# Patient Record
Sex: FEMALE | Race: WHITE | Employment: OTHER | URBAN - METROPOLITAN AREA
[De-identification: names, ages, dates, MRNs, and addresses within clinical notes are randomized per-mention and may not be internally consistent; named-entity substitution may affect disease eponyms.]

---

## 2017-01-06 RX ORDER — METFORMIN HYDROCHLORIDE 500 MG/1
TABLET, EXTENDED RELEASE ORAL
Qty: 30 TABLET | Refills: 10 | Status: SHIPPED | OUTPATIENT
Start: 2017-01-06 | End: 2017-09-06 | Stop reason: SDUPTHER

## 2017-02-13 RX ORDER — SIMVASTATIN 20 MG
20 TABLET ORAL NIGHTLY
Qty: 90 TABLET | Refills: 1 | Status: SHIPPED | OUTPATIENT
Start: 2017-02-13 | End: 2017-08-09 | Stop reason: SDUPTHER

## 2017-02-27 DIAGNOSIS — I10 ESSENTIAL HYPERTENSION: ICD-10-CM

## 2017-05-08 ENCOUNTER — HOSPITAL ENCOUNTER (OUTPATIENT)
Dept: MAMMOGRAPHY | Age: 77
Discharge: HOME | End: 2017-05-08
Attending: FAMILY MEDICINE | Admitting: FAMILY MEDICINE

## 2017-05-08 DIAGNOSIS — Z12.31 ENCOUNTER FOR SCREENING MAMMOGRAM FOR BREAST CANCER: ICD-10-CM

## 2017-05-31 DIAGNOSIS — I10 ESSENTIAL HYPERTENSION: ICD-10-CM

## 2017-05-31 RX ORDER — CELECOXIB 200 MG/1
200 CAPSULE ORAL DAILY
Qty: 90 CAPSULE | Refills: 1 | Status: SHIPPED | OUTPATIENT
Start: 2017-05-31 | End: 2018-01-04 | Stop reason: SDUPTHER

## 2017-06-12 ENCOUNTER — HOSPITAL ENCOUNTER (OUTPATIENT)
Dept: PHYSICAL THERAPY | Age: 77
Discharge: HOME | End: 2017-06-30
Admitting: ORTHOPAEDIC SURGERY

## 2017-06-23 ENCOUNTER — TELEPHONE (OUTPATIENT)
Dept: PHYSICAL THERAPY | Age: 77
End: 2017-06-23

## 2017-07-17 RX ORDER — HYDROCHLOROTHIAZIDE 50 MG/1
50 TABLET ORAL DAILY
Qty: 90 TABLET | Refills: 1 | Status: SHIPPED | OUTPATIENT
Start: 2017-07-17 | End: 2018-01-17 | Stop reason: SDUPTHER

## 2017-07-17 RX ORDER — LISINOPRIL 40 MG/1
40 TABLET ORAL DAILY
Qty: 90 TABLET | Refills: 1 | Status: SHIPPED | OUTPATIENT
Start: 2017-07-17 | End: 2018-01-17 | Stop reason: SDUPTHER

## 2017-08-09 DIAGNOSIS — I10 ESSENTIAL HYPERTENSION: Primary | ICD-10-CM

## 2017-08-09 DIAGNOSIS — E78.5 HYPERLIPIDEMIA, UNSPECIFIED HYPERLIPIDEMIA TYPE: ICD-10-CM

## 2017-08-09 DIAGNOSIS — M17.11 PRIMARY OSTEOARTHRITIS OF RIGHT KNEE: ICD-10-CM

## 2017-08-09 DIAGNOSIS — R73.03 PREDIABETES: ICD-10-CM

## 2017-08-09 DIAGNOSIS — Z00.00 WELL ADULT EXAM: ICD-10-CM

## 2017-08-09 RX ORDER — SIMVASTATIN 20 MG
20 TABLET ORAL NIGHTLY
Qty: 90 TABLET | Refills: 0 | Status: SHIPPED | OUTPATIENT
Start: 2017-08-09 | End: 2017-09-06 | Stop reason: SDUPTHER

## 2017-09-01 DIAGNOSIS — R73.03 PREDIABETES: ICD-10-CM

## 2017-09-01 DIAGNOSIS — I10 ESSENTIAL HYPERTENSION: ICD-10-CM

## 2017-09-01 DIAGNOSIS — E78.5 HYPERLIPIDEMIA, UNSPECIFIED HYPERLIPIDEMIA TYPE: ICD-10-CM

## 2017-09-01 DIAGNOSIS — Z00.00 WELL ADULT EXAM: ICD-10-CM

## 2017-09-01 LAB
A/G RATIO: 1.3 (ref 1.1–2.2)
ALBUMIN SERPL-MCNC: 4 G/DL (ref 3.4–5)
ALP BLD-CCNC: 84 U/L (ref 40–129)
ALT SERPL-CCNC: 18 U/L (ref 10–40)
ANION GAP SERPL CALCULATED.3IONS-SCNC: 20 MMOL/L (ref 3–16)
AST SERPL-CCNC: 18 U/L (ref 15–37)
BASOPHILS ABSOLUTE: 0 K/UL (ref 0–0.2)
BASOPHILS RELATIVE PERCENT: 0.3 %
BILIRUB SERPL-MCNC: 0.9 MG/DL (ref 0–1)
BUN BLDV-MCNC: 18 MG/DL (ref 7–20)
CALCIUM SERPL-MCNC: 9.6 MG/DL (ref 8.3–10.6)
CHLORIDE BLD-SCNC: 97 MMOL/L (ref 99–110)
CHOLESTEROL, TOTAL: 210 MG/DL (ref 0–199)
CO2: 26 MMOL/L (ref 21–32)
CREAT SERPL-MCNC: <0.5 MG/DL (ref 0.6–1.2)
EOSINOPHILS ABSOLUTE: 0.1 K/UL (ref 0–0.6)
EOSINOPHILS RELATIVE PERCENT: 1.3 %
GFR AFRICAN AMERICAN: >60
GFR NON-AFRICAN AMERICAN: >60
GLOBULIN: 3.1 G/DL
GLUCOSE BLD-MCNC: 120 MG/DL (ref 70–99)
HCT VFR BLD CALC: 45.6 % (ref 36–48)
HDLC SERPL-MCNC: 65 MG/DL (ref 40–60)
HEMOGLOBIN: 15.3 G/DL (ref 12–16)
LDL CHOLESTEROL CALCULATED: 99 MG/DL
LYMPHOCYTES ABSOLUTE: 3.3 K/UL (ref 1–5.1)
LYMPHOCYTES RELATIVE PERCENT: 34.5 %
MCH RBC QN AUTO: 31.7 PG (ref 26–34)
MCHC RBC AUTO-ENTMCNC: 33.5 G/DL (ref 31–36)
MCV RBC AUTO: 94.5 FL (ref 80–100)
MONOCYTES ABSOLUTE: 0.7 K/UL (ref 0–1.3)
MONOCYTES RELATIVE PERCENT: 7.5 %
NEUTROPHILS ABSOLUTE: 5.3 K/UL (ref 1.7–7.7)
NEUTROPHILS RELATIVE PERCENT: 56.4 %
PDW BLD-RTO: 14.1 % (ref 12.4–15.4)
PLATELET # BLD: 176 K/UL (ref 135–450)
PMV BLD AUTO: 10.1 FL (ref 5–10.5)
POTASSIUM SERPL-SCNC: 3.2 MMOL/L (ref 3.5–5.1)
RBC # BLD: 4.83 M/UL (ref 4–5.2)
SODIUM BLD-SCNC: 143 MMOL/L (ref 136–145)
TOTAL PROTEIN: 7.1 G/DL (ref 6.4–8.2)
TRIGL SERPL-MCNC: 232 MG/DL (ref 0–150)
TSH REFLEX: 2.74 UIU/ML (ref 0.27–4.2)
VLDLC SERPL CALC-MCNC: 46 MG/DL
WBC # BLD: 9.5 K/UL (ref 4–11)

## 2017-09-02 LAB
ESTIMATED AVERAGE GLUCOSE: 119.8 MG/DL
HBA1C MFR BLD: 5.8 %

## 2017-09-02 RX ORDER — POTASSIUM CHLORIDE 20 MEQ/1
20 TABLET, EXTENDED RELEASE ORAL DAILY
Qty: 60 TABLET | Refills: 1 | Status: SHIPPED | OUTPATIENT
Start: 2017-09-02 | End: 2018-01-04 | Stop reason: SDUPTHER

## 2017-09-06 ENCOUNTER — OFFICE VISIT (OUTPATIENT)
Dept: FAMILY MEDICINE CLINIC | Age: 77
End: 2017-09-06

## 2017-09-06 VITALS
BODY MASS INDEX: 47.99 KG/M2 | OXYGEN SATURATION: 95 % | WEIGHT: 254 LBS | SYSTOLIC BLOOD PRESSURE: 138 MMHG | HEART RATE: 78 BPM | DIASTOLIC BLOOD PRESSURE: 78 MMHG

## 2017-09-06 DIAGNOSIS — I10 ESSENTIAL HYPERTENSION: ICD-10-CM

## 2017-09-06 DIAGNOSIS — E87.6 HYPOKALEMIA: ICD-10-CM

## 2017-09-06 DIAGNOSIS — Z00.00 ENCOUNTER FOR MEDICARE ANNUAL WELLNESS EXAM: Primary | ICD-10-CM

## 2017-09-06 DIAGNOSIS — R10.32 LLQ ABDOMINAL PAIN: ICD-10-CM

## 2017-09-06 DIAGNOSIS — E66.01 MORBID OBESITY WITH BMI OF 40.0-44.9, ADULT (HCC): ICD-10-CM

## 2017-09-06 DIAGNOSIS — Z71.2 ENCOUNTER TO DISCUSS TEST RESULTS: ICD-10-CM

## 2017-09-06 DIAGNOSIS — Z23 FLU VACCINE NEED: ICD-10-CM

## 2017-09-06 DIAGNOSIS — E78.5 HYPERLIPIDEMIA, UNSPECIFIED HYPERLIPIDEMIA TYPE: ICD-10-CM

## 2017-09-06 DIAGNOSIS — R73.03 PREDIABETES: ICD-10-CM

## 2017-09-06 LAB
BACTERIA URINE, POC: ABNORMAL
BILIRUBIN URINE: 0 MG/DL
BLOOD, URINE: NEGATIVE
CASTS URINE, POC: ABNORMAL
CLARITY: ABNORMAL
COLOR: YELLOW
CRYSTALS URINE, POC: ABNORMAL
EPI CELLS URINE, POC: ABNORMAL
GLUCOSE URINE: NEGATIVE
KETONES, URINE: NEGATIVE
LEUKOCYTE EST, POC: ABNORMAL
NITRITE, URINE: NEGATIVE
PH UA: 6 (ref 4.5–8)
PROTEIN UA: POSITIVE
RBC URINE, POC: ABNORMAL
SPECIFIC GRAVITY UA: 1.01 (ref 1–1.03)
UROBILINOGEN, URINE: NORMAL
WBC URINE, POC: ABNORMAL
YEAST URINE, POC: ABNORMAL

## 2017-09-06 PROCEDURE — G0008 ADMIN INFLUENZA VIRUS VAC: HCPCS | Performed by: FAMILY MEDICINE

## 2017-09-06 PROCEDURE — 81000 URINALYSIS NONAUTO W/SCOPE: CPT | Performed by: FAMILY MEDICINE

## 2017-09-06 PROCEDURE — G0439 PPPS, SUBSEQ VISIT: HCPCS | Performed by: FAMILY MEDICINE

## 2017-09-06 PROCEDURE — 90662 IIV NO PRSV INCREASED AG IM: CPT | Performed by: FAMILY MEDICINE

## 2017-09-06 RX ORDER — ICOSAPENT ETHYL 1000 MG/1
2 CAPSULE ORAL DAILY
Qty: 60 CAPSULE | Refills: 3 | Status: SHIPPED | OUTPATIENT
Start: 2017-09-06 | End: 2017-09-28 | Stop reason: SDUPTHER

## 2017-09-06 RX ORDER — METFORMIN HYDROCHLORIDE 500 MG/1
500 TABLET, EXTENDED RELEASE ORAL
Qty: 90 TABLET | Refills: 3 | Status: SHIPPED | OUTPATIENT
Start: 2017-09-06 | End: 2018-09-22 | Stop reason: SDUPTHER

## 2017-09-06 RX ORDER — SIMVASTATIN 20 MG
20 TABLET ORAL NIGHTLY
Qty: 90 TABLET | Refills: 3 | Status: SHIPPED | OUTPATIENT
Start: 2017-09-06 | End: 2018-09-22 | Stop reason: SDUPTHER

## 2017-09-06 ASSESSMENT — LIFESTYLE VARIABLES
HOW OFTEN DURING THE LAST YEAR HAVE YOU HAD A FEELING OF GUILT OR REMORSE AFTER DRINKING: 0
HAS A RELATIVE, FRIEND, DOCTOR, OR ANOTHER HEALTH PROFESSIONAL EXPRESSED CONCERN ABOUT YOUR DRINKING OR SUGGESTED YOU CUT DOWN: 0
HOW OFTEN DURING THE LAST YEAR HAVE YOU NEEDED AN ALCOHOLIC DRINK FIRST THING IN THE MORNING TO GET YOURSELF GOING AFTER A NIGHT OF HEAVY DRINKING: 0
HOW OFTEN DO YOU HAVE SIX OR MORE DRINKS ON ONE OCCASION: 0
HOW OFTEN DURING THE LAST YEAR HAVE YOU FAILED TO DO WHAT WAS NORMALLY EXPECTED FROM YOU BECAUSE OF DRINKING: 0
AUDIT-C TOTAL SCORE: 1
HOW OFTEN DURING THE LAST YEAR HAVE YOU FOUND THAT YOU WERE NOT ABLE TO STOP DRINKING ONCE YOU HAD STARTED: 0
HOW OFTEN DO YOU HAVE A DRINK CONTAINING ALCOHOL: 1
HOW OFTEN DURING THE LAST YEAR HAVE YOU BEEN UNABLE TO REMEMBER WHAT HAPPENED THE NIGHT BEFORE BECAUSE YOU HAD BEEN DRINKING: 0
HOW MANY STANDARD DRINKS CONTAINING ALCOHOL DO YOU HAVE ON A TYPICAL DAY: 0
HAVE YOU OR SOMEONE ELSE BEEN INJURED AS A RESULT OF YOUR DRINKING: 0
AUDIT TOTAL SCORE: 1

## 2017-09-06 ASSESSMENT — PATIENT HEALTH QUESTIONNAIRE - PHQ9: SUM OF ALL RESPONSES TO PHQ QUESTIONS 1-9: 0

## 2017-09-06 ASSESSMENT — ANXIETY QUESTIONNAIRES: GAD7 TOTAL SCORE: 1

## 2017-09-07 ENCOUNTER — NURSE ONLY (OUTPATIENT)
Dept: FAMILY MEDICINE CLINIC | Age: 77
End: 2017-09-07

## 2017-09-07 DIAGNOSIS — E78.5 HYPERLIPIDEMIA, UNSPECIFIED HYPERLIPIDEMIA TYPE: ICD-10-CM

## 2017-09-07 DIAGNOSIS — Z12.11 SCREEN FOR COLON CANCER: Primary | ICD-10-CM

## 2017-09-08 LAB
CONTROL: NORMAL
HEMOCCULT STL QL: NEGATIVE
ORGANISM: ABNORMAL
ORGANISM: ABNORMAL
URINE CULTURE, ROUTINE: ABNORMAL
URINE CULTURE, ROUTINE: ABNORMAL

## 2017-09-08 PROCEDURE — 82274 ASSAY TEST FOR BLOOD FECAL: CPT | Performed by: FAMILY MEDICINE

## 2017-09-08 RX ORDER — CIPROFLOXACIN 250 MG/1
250 TABLET, FILM COATED ORAL 2 TIMES DAILY
Qty: 14 TABLET | Refills: 0 | Status: SHIPPED | OUTPATIENT
Start: 2017-09-08 | End: 2017-09-15

## 2017-09-28 DIAGNOSIS — E78.5 HYPERLIPIDEMIA, UNSPECIFIED HYPERLIPIDEMIA TYPE: ICD-10-CM

## 2017-09-28 RX ORDER — ICOSAPENT ETHYL 1000 MG/1
2 CAPSULE ORAL DAILY
Qty: 60 CAPSULE | Refills: 5 | Status: SHIPPED | OUTPATIENT
Start: 2017-09-28 | End: 2018-04-11 | Stop reason: ALTCHOICE

## 2017-12-26 DIAGNOSIS — I10 ESSENTIAL HYPERTENSION: ICD-10-CM

## 2017-12-26 NOTE — TELEPHONE ENCOUNTER
Last Ov: 09/06/2017, medicare annual wellness exam  Last Refill: 05/31/2017, #180,1    Lab Results   Component Value Date     09/01/2017    K 3.2 (L) 09/01/2017    CL 97 (L) 09/01/2017    CO2 26 09/01/2017    BUN 18 09/01/2017    CREATININE <0.5 (L) 09/01/2017    GLUCOSE 120 (H) 09/01/2017    CALCIUM 9.6 09/01/2017    PROT 7.1 09/01/2017    LABALBU 4.0 09/01/2017    BILITOT 0.9 09/01/2017    ALKPHOS 84 09/01/2017    AST 18 09/01/2017    ALT 18 09/01/2017    LABGLOM >60 09/01/2017    GFRAA >60 09/01/2017    AGRATIO 1.3 09/01/2017    GLOB 3.1 09/01/2017

## 2018-01-04 DIAGNOSIS — I10 ESSENTIAL HYPERTENSION: ICD-10-CM

## 2018-01-04 RX ORDER — CELECOXIB 200 MG/1
200 CAPSULE ORAL DAILY
Qty: 90 CAPSULE | Refills: 3 | Status: SHIPPED | OUTPATIENT
Start: 2018-01-04 | End: 2018-12-22 | Stop reason: SDUPTHER

## 2018-01-04 RX ORDER — POTASSIUM CHLORIDE 20 MEQ/1
20 TABLET, EXTENDED RELEASE ORAL DAILY
Qty: 90 TABLET | Refills: 3 | Status: SHIPPED | OUTPATIENT
Start: 2018-01-04 | End: 2018-12-22 | Stop reason: SDUPTHER

## 2018-01-04 NOTE — TELEPHONE ENCOUNTER
Last OV 9/6/17    Lab Results   Component Value Date     09/01/2017    K 3.2 (L) 09/01/2017    CL 97 (L) 09/01/2017    CO2 26 09/01/2017    BUN 18 09/01/2017    CREATININE <0.5 (L) 09/01/2017    GLUCOSE 120 (H) 09/01/2017    CALCIUM 9.6 09/01/2017    PROT 7.1 09/01/2017    LABALBU 4.0 09/01/2017    BILITOT 0.9 09/01/2017    ALKPHOS 84 09/01/2017    AST 18 09/01/2017    ALT 18 09/01/2017    LABGLOM >60 09/01/2017    GFRAA >60 09/01/2017    AGRATIO 1.3 09/01/2017    GLOB 3.1 09/01/2017

## 2018-04-11 ENCOUNTER — OFFICE VISIT (OUTPATIENT)
Dept: ORTHOPEDIC SURGERY | Age: 78
End: 2018-04-11

## 2018-04-11 VITALS
BODY MASS INDEX: 50.89 KG/M2 | SYSTOLIC BLOOD PRESSURE: 136 MMHG | HEIGHT: 60 IN | DIASTOLIC BLOOD PRESSURE: 78 MMHG | HEART RATE: 91 BPM | WEIGHT: 259.2 LBS

## 2018-04-11 DIAGNOSIS — M17.12 PRIMARY OSTEOARTHRITIS OF LEFT KNEE: ICD-10-CM

## 2018-04-11 DIAGNOSIS — M17.11 PRIMARY OSTEOARTHRITIS OF RIGHT KNEE: ICD-10-CM

## 2018-04-11 DIAGNOSIS — M25.561 CHRONIC PAIN OF BOTH KNEES: Primary | ICD-10-CM

## 2018-04-11 DIAGNOSIS — G89.29 CHRONIC PAIN OF BOTH KNEES: Primary | ICD-10-CM

## 2018-04-11 DIAGNOSIS — M25.562 CHRONIC PAIN OF BOTH KNEES: Primary | ICD-10-CM

## 2018-04-11 PROCEDURE — APPNB15 APP NON BILLABLE TIME 0-15 MINS: Performed by: PHYSICIAN ASSISTANT

## 2018-04-11 PROCEDURE — 20610 DRAIN/INJ JOINT/BURSA W/O US: CPT | Performed by: ORTHOPAEDIC SURGERY

## 2018-04-11 PROCEDURE — 99213 OFFICE O/P EST LOW 20 MIN: CPT | Performed by: ORTHOPAEDIC SURGERY

## 2018-04-11 RX ORDER — BETAMETHASONE SODIUM PHOSPHATE AND BETAMETHASONE ACETATE 3; 3 MG/ML; MG/ML
12 INJECTION, SUSPENSION INTRA-ARTICULAR; INTRALESIONAL; INTRAMUSCULAR; SOFT TISSUE ONCE
Status: COMPLETED | OUTPATIENT
Start: 2018-04-11 | End: 2018-04-11

## 2018-04-11 RX ADMIN — BETAMETHASONE SODIUM PHOSPHATE AND BETAMETHASONE ACETATE 12 MG: 3; 3 INJECTION, SUSPENSION INTRA-ARTICULAR; INTRALESIONAL; INTRAMUSCULAR; SOFT TISSUE at 14:14

## 2018-04-20 DIAGNOSIS — E87.6 HYPOKALEMIA: ICD-10-CM

## 2018-04-20 DIAGNOSIS — E78.5 HYPERLIPIDEMIA, UNSPECIFIED HYPERLIPIDEMIA TYPE: ICD-10-CM

## 2018-04-21 LAB
ALBUMIN SERPL-MCNC: 4.5 G/DL (ref 3.4–5)
ALP BLD-CCNC: 95 U/L (ref 40–129)
ALT SERPL-CCNC: 27 U/L (ref 10–40)
ANION GAP SERPL CALCULATED.3IONS-SCNC: 19 MMOL/L (ref 3–16)
AST SERPL-CCNC: 19 U/L (ref 15–37)
BILIRUB SERPL-MCNC: 1 MG/DL (ref 0–1)
BILIRUBIN DIRECT: <0.2 MG/DL (ref 0–0.3)
BILIRUBIN, INDIRECT: NORMAL MG/DL (ref 0–1)
BUN BLDV-MCNC: 20 MG/DL (ref 7–20)
CALCIUM SERPL-MCNC: 9.8 MG/DL (ref 8.3–10.6)
CHLORIDE BLD-SCNC: 95 MMOL/L (ref 99–110)
CO2: 29 MMOL/L (ref 21–32)
CREAT SERPL-MCNC: <0.5 MG/DL (ref 0.6–1.2)
GFR AFRICAN AMERICAN: >60
GFR NON-AFRICAN AMERICAN: >60
GLUCOSE BLD-MCNC: 95 MG/DL (ref 70–99)
POTASSIUM SERPL-SCNC: 4.1 MMOL/L (ref 3.5–5.1)
SODIUM BLD-SCNC: 143 MMOL/L (ref 136–145)
TOTAL PROTEIN: 7.4 G/DL (ref 6.4–8.2)

## 2018-05-09 ENCOUNTER — OFFICE VISIT (OUTPATIENT)
Dept: FAMILY MEDICINE CLINIC | Age: 78
End: 2018-05-09

## 2018-05-09 VITALS
BODY MASS INDEX: 47.98 KG/M2 | DIASTOLIC BLOOD PRESSURE: 74 MMHG | WEIGHT: 244.4 LBS | OXYGEN SATURATION: 96 % | HEIGHT: 60 IN | SYSTOLIC BLOOD PRESSURE: 130 MMHG | HEART RATE: 79 BPM

## 2018-05-09 DIAGNOSIS — R73.03 PREDIABETES: ICD-10-CM

## 2018-05-09 DIAGNOSIS — E78.49 OTHER HYPERLIPIDEMIA: ICD-10-CM

## 2018-05-09 DIAGNOSIS — I10 ESSENTIAL HYPERTENSION: Primary | ICD-10-CM

## 2018-05-09 DIAGNOSIS — E66.01 MORBID OBESITY WITH BMI OF 45.0-49.9, ADULT (HCC): ICD-10-CM

## 2018-05-09 LAB
CHOLESTEROL, TOTAL: 174 MG/DL (ref 0–199)
HDLC SERPL-MCNC: 57 MG/DL (ref 40–60)
LDL CHOLESTEROL CALCULATED: 70 MG/DL
TRIGL SERPL-MCNC: 233 MG/DL (ref 0–150)
VLDLC SERPL CALC-MCNC: 47 MG/DL

## 2018-05-09 PROCEDURE — 99214 OFFICE O/P EST MOD 30 MIN: CPT | Performed by: FAMILY MEDICINE

## 2018-05-09 RX ORDER — CHLORTHALIDONE 25 MG/1
25 TABLET ORAL DAILY
Qty: 90 TABLET | Refills: 3 | Status: SHIPPED | OUTPATIENT
Start: 2018-05-09 | End: 2019-03-22 | Stop reason: SDUPTHER

## 2018-05-10 LAB
ESTIMATED AVERAGE GLUCOSE: 122.6 MG/DL
HBA1C MFR BLD: 5.9 %

## 2018-07-02 ENCOUNTER — OFFICE VISIT (OUTPATIENT)
Dept: ORTHOPEDIC SURGERY | Age: 78
End: 2018-07-02

## 2018-07-02 VITALS
WEIGHT: 244 LBS | BODY MASS INDEX: 47.91 KG/M2 | HEIGHT: 60 IN | HEART RATE: 70 BPM | DIASTOLIC BLOOD PRESSURE: 86 MMHG | SYSTOLIC BLOOD PRESSURE: 136 MMHG

## 2018-07-02 DIAGNOSIS — M17.12 PRIMARY OSTEOARTHRITIS OF LEFT KNEE: ICD-10-CM

## 2018-07-02 DIAGNOSIS — M17.11 PRIMARY OSTEOARTHRITIS OF RIGHT KNEE: Primary | ICD-10-CM

## 2018-07-02 PROCEDURE — 20610 DRAIN/INJ JOINT/BURSA W/O US: CPT | Performed by: PHYSICIAN ASSISTANT

## 2018-07-02 PROCEDURE — 99213 OFFICE O/P EST LOW 20 MIN: CPT | Performed by: PHYSICIAN ASSISTANT

## 2018-07-02 RX ORDER — BETAMETHASONE SODIUM PHOSPHATE AND BETAMETHASONE ACETATE 3; 3 MG/ML; MG/ML
12 INJECTION, SUSPENSION INTRA-ARTICULAR; INTRALESIONAL; INTRAMUSCULAR; SOFT TISSUE ONCE
Status: COMPLETED | OUTPATIENT
Start: 2018-07-02 | End: 2018-07-02

## 2018-07-02 RX ADMIN — BETAMETHASONE SODIUM PHOSPHATE AND BETAMETHASONE ACETATE 12 MG: 3; 3 INJECTION, SUSPENSION INTRA-ARTICULAR; INTRALESIONAL; INTRAMUSCULAR; SOFT TISSUE at 14:57

## 2018-07-02 RX ADMIN — BETAMETHASONE SODIUM PHOSPHATE AND BETAMETHASONE ACETATE 12 MG: 3; 3 INJECTION, SUSPENSION INTRA-ARTICULAR; INTRALESIONAL; INTRAMUSCULAR; SOFT TISSUE at 14:54

## 2018-07-02 NOTE — PROGRESS NOTES
The correct patient, procedure, site and side were identified. Both knees were prepped with Betadine and 2 mL of betamethasone (12mg) mixed with 5 mL 1% lidocaine plain (50mg) were instilled with careful aspiration and injection under aseptic technique. The skin was then cleaned again with alcohol and a sterile adhesive dressing was applied. She tolerated this well and was instructed regarding post-injection care of icing and decreased activity as necessary. Impression:   Diagnosis Orders   1. Primary osteoarthritis of right knee  92605 - [place] DRAIN/INJECT LARGE JOINT/BURSA   2. Primary osteoarthritis of left knee  12214 - [place] DRAIN/INJECT LARGE JOINT/BURSA       Treatment Plan:    Patient presented today requesting repeat bilateral knee cortisone injections. She receives these about every 3 months. Her last ones worked very well. She is not yet ready to discuss total knee arthroplasty. Cortisone injections completed as recorded above in the procedure note. Patient will follow-up as needed. Penny Salgado was informed of the results of any imaging. We discussed treatment options and a time was given to answer questions. A plan was proposed and Penny Salgado understand and accepts this course of care. Electronically signed by Argenis Davidson PA-C on 9/3/1542  Board Certified [facility]    Please note that portions of this note were completed with a voice recognition program.  Efforts were made to edit the dictations but occasionally words are mis-transcribed.

## 2018-09-22 DIAGNOSIS — I10 ESSENTIAL HYPERTENSION: ICD-10-CM

## 2018-09-22 DIAGNOSIS — R73.03 PREDIABETES: ICD-10-CM

## 2018-09-22 DIAGNOSIS — E78.5 HYPERLIPIDEMIA, UNSPECIFIED HYPERLIPIDEMIA TYPE: ICD-10-CM

## 2018-09-25 ENCOUNTER — OFFICE VISIT (OUTPATIENT)
Dept: ORTHOPEDIC SURGERY | Age: 78
End: 2018-09-25
Payer: MEDICARE

## 2018-09-25 VITALS
DIASTOLIC BLOOD PRESSURE: 79 MMHG | HEIGHT: 60 IN | HEART RATE: 89 BPM | WEIGHT: 244 LBS | BODY MASS INDEX: 47.91 KG/M2 | SYSTOLIC BLOOD PRESSURE: 139 MMHG

## 2018-09-25 DIAGNOSIS — M17.11 PRIMARY OSTEOARTHRITIS OF RIGHT KNEE: Primary | ICD-10-CM

## 2018-09-25 DIAGNOSIS — M17.12 PRIMARY OSTEOARTHRITIS OF LEFT KNEE: ICD-10-CM

## 2018-09-25 PROCEDURE — 20610 DRAIN/INJ JOINT/BURSA W/O US: CPT | Performed by: PHYSICIAN ASSISTANT

## 2018-09-25 PROCEDURE — 99213 OFFICE O/P EST LOW 20 MIN: CPT | Performed by: PHYSICIAN ASSISTANT

## 2018-09-25 RX ORDER — LISINOPRIL 40 MG/1
TABLET ORAL
Qty: 90 TABLET | Refills: 1 | Status: SHIPPED | OUTPATIENT
Start: 2018-09-25 | End: 2019-03-22 | Stop reason: SDUPTHER

## 2018-09-25 RX ORDER — METFORMIN HYDROCHLORIDE 500 MG/1
TABLET, EXTENDED RELEASE ORAL
Qty: 90 TABLET | Refills: 1 | Status: SHIPPED | OUTPATIENT
Start: 2018-09-25 | End: 2019-03-22 | Stop reason: SDUPTHER

## 2018-09-25 RX ORDER — BETAMETHASONE SODIUM PHOSPHATE AND BETAMETHASONE ACETATE 3; 3 MG/ML; MG/ML
12 INJECTION, SUSPENSION INTRA-ARTICULAR; INTRALESIONAL; INTRAMUSCULAR; SOFT TISSUE ONCE
Status: COMPLETED | OUTPATIENT
Start: 2018-09-25 | End: 2018-09-25

## 2018-09-25 RX ORDER — SIMVASTATIN 20 MG
TABLET ORAL
Qty: 90 TABLET | Refills: 1 | Status: SHIPPED | OUTPATIENT
Start: 2018-09-25 | End: 2019-03-22 | Stop reason: SDUPTHER

## 2018-09-25 RX ADMIN — BETAMETHASONE SODIUM PHOSPHATE AND BETAMETHASONE ACETATE 12 MG: 3; 3 INJECTION, SUSPENSION INTRA-ARTICULAR; INTRALESIONAL; INTRAMUSCULAR; SOFT TISSUE at 14:35

## 2018-09-25 RX ADMIN — BETAMETHASONE SODIUM PHOSPHATE AND BETAMETHASONE ACETATE 12 MG: 3; 3 INJECTION, SUSPENSION INTRA-ARTICULAR; INTRALESIONAL; INTRAMUSCULAR; SOFT TISSUE at 14:36

## 2018-09-25 NOTE — TELEPHONE ENCOUNTER
Last OV 05/09/18  Last RF  Lisinopril: 01/17/18 #90, 2 refills  Metformin: 09/06/17 #90, 3 refills  Metoprolol: 12/26/17 #180, 2 refills  Simvastatin: 006/17 #90, 3 refills  Lab Results   Component Value Date    CHOL 174 05/09/2018    CHOL 210 (H) 09/01/2017    CHOL 196 05/25/2016     Lab Results   Component Value Date    TRIG 233 (H) 05/09/2018    TRIG 232 (H) 09/01/2017    TRIG 193 (H) 05/25/2016     Lab Results   Component Value Date    HDL 57 05/09/2018    HDL 65 (H) 09/01/2017    HDL 67 (H) 05/25/2016     Lab Results   Component Value Date    LDLCALC 70 05/09/2018    LDLCALC 99 09/01/2017    [address] 90 05/25/2016     Lab Results   Component Value Date    LABVLDL 47 05/09/2018    LABVLDL 46 09/01/2017    LABVLDL 39 05/25/2016     No results found for: [facility]  Lab Results   Component Value Date    LABA1C 5.9 05/09/2018     Lab Results   Component Value Date    .6 05/09/2018

## 2018-09-25 NOTE — PROGRESS NOTES
Patient Name: Dara Soliz Dr Record Number: [id number]  [place]: 1940  Date of Encounter: 9/25/2018     Chief Complaint   Patient presents with    Knee Pain     follow up after bilateral knee injections       History of Present Illness:   Ms. Jasen Urbina is here in follow up regarding her chronic bilateral knee pain. Patient was a long-time patient of Dr. Gavin Vasquez. She has known arthritis of her knees bilaterally. She receives cortisone injections every 3 months. She is here today for repeat cortisone injections. She denies any recent injury to her knees bilaterally. She is not yet ready to talk about knee arthroplasty. The patient's past medical history, medications, allergies, family history, social history, and review of systems have been reviewed, and dated and are recorded in the chart under the 'MEDIA\" tab. Physical Exam:    Ms. Jasen Urbina appears well, she is in no apparent distress, she demonstrates appropriate mood & affect. She is alert and oriented to person, place and time. /79   Pulse 89   Ht 5' (1.524 m)   Wt 244 lb (110.7 kg)   LMP 05/20/1991   BMI 47.65 kg/m²     On examination of patient's knees bilaterally there are skin joint effusions. She has tenderness on palpation of the medial joint lines. She has great range of motion. She has good strength. Orders:  Orders Placed This Encounter   Procedures    [place] ARTHROCENTESIS ASPIR&/INJ MAJOR JT/BURSA W/O US       Injections:  After discussing the risks and benefits of cortisone injection including increased pain, drug reaction, infection, bleeding, blood glucose elevation, lack of improvement and neurovascular injury she agreed to receive one today. Questions were encouraged and answered. The correct patient, procedure, site and side were identified.  Both knees were prepped with Betadine and 2 mL of betamethasone (12mg) mixed with 5 mL 1% lidocaine plain (50mg) were instilled with careful aspiration and injection under aseptic technique. The skin was then cleaned again with alcohol and a sterile adhesive dressing was applied. She tolerated this well and was instructed regarding post-injection care of icing and decreased activity as necessary. Impression:   Diagnosis Orders   1. Primary osteoarthritis of right knee  betamethasone acetate-betamethasone sodium phosphate (CELESTONE) injection 12 mg    [place] ARTHROCENTESIS ASPIR&/INJ MAJOR JT/BURSA W/O US    betamethasone acetate-betamethasone sodium phosphate (CELESTONE) injection 12 mg   2. Primary osteoarthritis of left knee  betamethasone acetate-betamethasone sodium phosphate (CELESTONE) injection 12 mg    [place] ARTHROCENTESIS ASPIR&/INJ MAJOR JT/BURSA W/O US    betamethasone acetate-betamethasone sodium phosphate (CELESTONE) injection 12 mg       Treatment Plan:    Patient presented today for repeat cortisone injections of her knees bilaterally. She receives injections regularly. They still provide very good relief. She is not yet ready to talk about total knee arthroplasty. She is given typical post injection precautions. She will abide by fall precautions. She will follow-up as needed. Keara Diallo was informed of the results of any imaging. We discussed treatment options and a time was given to answer questions. A plan was proposed and Keara Diallo understand and accepts this course of care. Electronically signed by Sadi Garcia PA-C on 2/15/1868  Board Certified [facility]    Please note that portions of this note were completed with a voice recognition program.  Efforts were made to edit the dictations but occasionally words are mis-transcribed.

## 2018-12-17 ENCOUNTER — OFFICE VISIT (OUTPATIENT)
Dept: FAMILY MEDICINE CLINIC | Age: 78
End: 2018-12-17
Payer: MEDICARE

## 2018-12-17 VITALS
OXYGEN SATURATION: 94 % | TEMPERATURE: 97.7 F | SYSTOLIC BLOOD PRESSURE: 137 MMHG | BODY MASS INDEX: 48.69 KG/M2 | HEIGHT: 60 IN | WEIGHT: 248 LBS | DIASTOLIC BLOOD PRESSURE: 70 MMHG | HEART RATE: 88 BPM

## 2018-12-17 DIAGNOSIS — I10 ESSENTIAL HYPERTENSION: ICD-10-CM

## 2018-12-17 DIAGNOSIS — Z23 FLU VACCINE NEED: ICD-10-CM

## 2018-12-17 DIAGNOSIS — R73.03 PREDIABETES: ICD-10-CM

## 2018-12-17 DIAGNOSIS — Z12.39 BREAST CANCER SCREENING: ICD-10-CM

## 2018-12-17 DIAGNOSIS — E78.2 MIXED HYPERLIPIDEMIA: ICD-10-CM

## 2018-12-17 DIAGNOSIS — Z00.00 ENCOUNTER FOR MEDICARE ANNUAL WELLNESS EXAM: Primary | ICD-10-CM

## 2018-12-17 DIAGNOSIS — E66.01 MORBID OBESITY WITH BMI OF 45.0-49.9, ADULT (HCC): ICD-10-CM

## 2018-12-17 PROCEDURE — G0439 PPPS, SUBSEQ VISIT: HCPCS | Performed by: FAMILY MEDICINE

## 2018-12-17 PROCEDURE — G0008 ADMIN INFLUENZA VIRUS VAC: HCPCS | Performed by: FAMILY MEDICINE

## 2018-12-17 PROCEDURE — 90662 IIV NO PRSV INCREASED AG IM: CPT | Performed by: FAMILY MEDICINE

## 2018-12-17 ASSESSMENT — ANXIETY QUESTIONNAIRES: GAD7 TOTAL SCORE: 0

## 2018-12-17 ASSESSMENT — LIFESTYLE VARIABLES
HOW OFTEN DURING THE LAST YEAR HAVE YOU HAD A FEELING OF GUILT OR REMORSE AFTER DRINKING: 0
HAS A RELATIVE, FRIEND, DOCTOR, OR ANOTHER HEALTH PROFESSIONAL EXPRESSED CONCERN ABOUT YOUR DRINKING OR SUGGESTED YOU CUT DOWN: 0
HOW OFTEN DO YOU HAVE SIX OR MORE DRINKS ON ONE OCCASION: 0
HOW MANY STANDARD DRINKS CONTAINING ALCOHOL DO YOU HAVE ON A TYPICAL DAY: 0
HAVE YOU OR SOMEONE ELSE BEEN INJURED AS A RESULT OF YOUR DRINKING: 0
HOW OFTEN DO YOU HAVE A DRINK CONTAINING ALCOHOL: 1
HOW OFTEN DURING THE LAST YEAR HAVE YOU BEEN UNABLE TO REMEMBER WHAT HAPPENED THE NIGHT BEFORE BECAUSE YOU HAD BEEN DRINKING: 0
HOW OFTEN DURING THE LAST YEAR HAVE YOU NEEDED AN ALCOHOLIC DRINK FIRST THING IN THE MORNING TO GET YOURSELF GOING AFTER A NIGHT OF HEAVY DRINKING: 0
AUDIT-C TOTAL SCORE: 1
HOW OFTEN DURING THE LAST YEAR HAVE YOU FAILED TO DO WHAT WAS NORMALLY EXPECTED FROM YOU BECAUSE OF DRINKING: 0

## 2018-12-17 ASSESSMENT — PATIENT HEALTH QUESTIONNAIRE - PHQ9
SUM OF ALL RESPONSES TO PHQ QUESTIONS 1-9: 0
SUM OF ALL RESPONSES TO PHQ QUESTIONS 1-9: 0
SUM OF ALL RESPONSES TO PHQ9 QUESTIONS 1 & 2: 0
SUM OF ALL RESPONSES TO PHQ QUESTIONS 1-9: 0
2. FEELING DOWN, DEPRESSED OR HOPELESS: 0
1. LITTLE INTEREST OR PLEASURE IN DOING THINGS: 0
SUM OF ALL RESPONSES TO PHQ QUESTIONS 1-9: 0

## 2018-12-17 NOTE — PROGRESS NOTES
screen  05/08/2019    DTaP/Tdap/Td vaccine (2 - Td) 08/10/2025    DEXA (modify frequency per FRAX score)  Completed    Pneumococcal low/med risk  Completed     Recommendations for Preventive Services Due: see orders.   Recommended screening schedule for the next 5-10 years is provided to the patient in written form: see Patient Instructions/AVS.    Flu vaccine administered today  May her aware new shingles vaccine  Continue  blood pressure meds and continue cholesterol medication  Screen for diabetes  Refer to weight loss management/patient seems willing this time    F/U Dr. Arleen Hoffman every 6 months

## 2018-12-18 LAB
A/G RATIO: 1.6 (ref 1.1–2.2)
ALBUMIN SERPL-MCNC: 4.7 G/DL (ref 3.4–5)
ALP BLD-CCNC: 87 U/L (ref 40–129)
ALT SERPL-CCNC: 22 U/L (ref 10–40)
ANION GAP SERPL CALCULATED.3IONS-SCNC: 21 MMOL/L (ref 3–16)
AST SERPL-CCNC: 25 U/L (ref 15–37)
BILIRUB SERPL-MCNC: 1.6 MG/DL (ref 0–1)
BUN BLDV-MCNC: 12 MG/DL (ref 7–20)
CALCIUM SERPL-MCNC: 10.5 MG/DL (ref 8.3–10.6)
CHLORIDE BLD-SCNC: 98 MMOL/L (ref 99–110)
CO2: 27 MMOL/L (ref 21–32)
CREAT SERPL-MCNC: <0.5 MG/DL (ref 0.6–1.2)
ESTIMATED AVERAGE GLUCOSE: 125.5 MG/DL
GFR AFRICAN AMERICAN: >60
GFR NON-AFRICAN AMERICAN: >60
GLOBULIN: 2.9 G/DL
GLUCOSE BLD-MCNC: 104 MG/DL (ref 70–99)
HBA1C MFR BLD: 6 %
POTASSIUM SERPL-SCNC: 3.5 MMOL/L (ref 3.5–5.1)
SODIUM BLD-SCNC: 146 MMOL/L (ref 136–145)
TOTAL PROTEIN: 7.6 G/DL (ref 6.4–8.2)

## 2018-12-19 ENCOUNTER — OFFICE VISIT (OUTPATIENT)
Dept: ORTHOPEDIC SURGERY | Age: 78
End: 2018-12-19
Payer: MEDICARE

## 2018-12-19 VITALS
HEIGHT: 60 IN | HEART RATE: 81 BPM | DIASTOLIC BLOOD PRESSURE: 81 MMHG | SYSTOLIC BLOOD PRESSURE: 166 MMHG | BODY MASS INDEX: 48.29 KG/M2 | WEIGHT: 246 LBS

## 2018-12-19 DIAGNOSIS — M17.0 PRIMARY OSTEOARTHRITIS OF BOTH KNEES: Primary | ICD-10-CM

## 2018-12-19 PROCEDURE — 20610 DRAIN/INJ JOINT/BURSA W/O US: CPT | Performed by: PHYSICIAN ASSISTANT

## 2018-12-19 PROCEDURE — 99213 OFFICE O/P EST LOW 20 MIN: CPT | Performed by: PHYSICIAN ASSISTANT

## 2018-12-19 RX ORDER — BETAMETHASONE SODIUM PHOSPHATE AND BETAMETHASONE ACETATE 3; 3 MG/ML; MG/ML
12 INJECTION, SUSPENSION INTRA-ARTICULAR; INTRALESIONAL; INTRAMUSCULAR; SOFT TISSUE ONCE
Status: COMPLETED | OUTPATIENT
Start: 2018-12-19 | End: 2018-12-19

## 2018-12-19 RX ADMIN — BETAMETHASONE SODIUM PHOSPHATE AND BETAMETHASONE ACETATE 12 MG: 3; 3 INJECTION, SUSPENSION INTRA-ARTICULAR; INTRALESIONAL; INTRAMUSCULAR; SOFT TISSUE at 16:49

## 2018-12-19 RX ADMIN — BETAMETHASONE SODIUM PHOSPHATE AND BETAMETHASONE ACETATE 12 MG: 3; 3 INJECTION, SUSPENSION INTRA-ARTICULAR; INTRALESIONAL; INTRAMUSCULAR; SOFT TISSUE at 16:50

## 2018-12-19 NOTE — PROGRESS NOTES
Betamethasone:  NDC: 2230-0454-97  Lot Number: [id number]  Expiration Date: 8/20    Right and left knees

## 2018-12-22 DIAGNOSIS — I10 ESSENTIAL HYPERTENSION: ICD-10-CM

## 2018-12-24 RX ORDER — POTASSIUM CHLORIDE 20 MEQ/1
TABLET, EXTENDED RELEASE ORAL
Qty: 90 TABLET | Refills: 3 | Status: SHIPPED | OUTPATIENT
Start: 2018-12-24 | End: 2019-12-17 | Stop reason: SDUPTHER

## 2018-12-24 RX ORDER — CELECOXIB 200 MG/1
CAPSULE ORAL
Qty: 90 CAPSULE | Refills: 3 | Status: SHIPPED | OUTPATIENT
Start: 2018-12-24 | End: 2019-12-17 | Stop reason: SDUPTHER

## 2019-03-13 ENCOUNTER — OFFICE VISIT (OUTPATIENT)
Dept: ORTHOPEDIC SURGERY | Age: 79
End: 2019-03-13
Payer: MEDICARE

## 2019-03-13 VITALS
HEIGHT: 60 IN | SYSTOLIC BLOOD PRESSURE: 131 MMHG | BODY MASS INDEX: 47.23 KG/M2 | DIASTOLIC BLOOD PRESSURE: 72 MMHG | HEART RATE: 80 BPM | WEIGHT: 240.6 LBS

## 2019-03-13 DIAGNOSIS — M17.0 PRIMARY OSTEOARTHRITIS OF BOTH KNEES: Primary | ICD-10-CM

## 2019-03-13 PROCEDURE — 99213 OFFICE O/P EST LOW 20 MIN: CPT | Performed by: PHYSICIAN ASSISTANT

## 2019-03-13 PROCEDURE — 20610 DRAIN/INJ JOINT/BURSA W/O US: CPT | Performed by: PHYSICIAN ASSISTANT

## 2019-03-13 RX ORDER — TRIAMCINOLONE ACETONIDE 40 MG/ML
80 INJECTION, SUSPENSION INTRA-ARTICULAR; INTRAMUSCULAR ONCE
Status: COMPLETED | OUTPATIENT
Start: 2019-03-13 | End: 2019-03-13

## 2019-03-13 RX ADMIN — TRIAMCINOLONE ACETONIDE 80 MG: 40 INJECTION, SUSPENSION INTRA-ARTICULAR; INTRAMUSCULAR at 15:37

## 2019-03-22 DIAGNOSIS — I10 ESSENTIAL HYPERTENSION: ICD-10-CM

## 2019-03-22 DIAGNOSIS — E78.5 HYPERLIPIDEMIA, UNSPECIFIED HYPERLIPIDEMIA TYPE: ICD-10-CM

## 2019-03-22 DIAGNOSIS — R73.03 PREDIABETES: ICD-10-CM

## 2019-03-23 RX ORDER — METFORMIN HYDROCHLORIDE 500 MG/1
TABLET, EXTENDED RELEASE ORAL
Qty: 90 TABLET | Refills: 2 | Status: SHIPPED | OUTPATIENT
Start: 2019-03-23 | End: 2019-12-17 | Stop reason: SDUPTHER

## 2019-03-23 RX ORDER — LISINOPRIL 40 MG/1
TABLET ORAL
Qty: 90 TABLET | Refills: 2 | Status: SHIPPED | OUTPATIENT
Start: 2019-03-23 | End: 2019-12-17 | Stop reason: SDUPTHER

## 2019-03-23 RX ORDER — CHLORTHALIDONE 25 MG/1
TABLET ORAL
Qty: 90 TABLET | Refills: 2 | Status: SHIPPED | OUTPATIENT
Start: 2019-03-23 | End: 2019-12-17 | Stop reason: SDUPTHER

## 2019-03-23 RX ORDER — SIMVASTATIN 20 MG
TABLET ORAL
Qty: 90 TABLET | Refills: 2 | Status: SHIPPED | OUTPATIENT
Start: 2019-03-23 | End: 2019-12-17 | Stop reason: SDUPTHER

## 2019-06-05 ENCOUNTER — OFFICE VISIT (OUTPATIENT)
Dept: ORTHOPEDIC SURGERY | Age: 79
End: 2019-06-05
Payer: MEDICARE

## 2019-06-05 VITALS
SYSTOLIC BLOOD PRESSURE: 148 MMHG | DIASTOLIC BLOOD PRESSURE: 77 MMHG | WEIGHT: 244 LBS | HEART RATE: 92 BPM | HEIGHT: 60 IN | BODY MASS INDEX: 47.91 KG/M2

## 2019-06-05 DIAGNOSIS — M17.12 PRIMARY OSTEOARTHRITIS OF LEFT KNEE: ICD-10-CM

## 2019-06-05 DIAGNOSIS — M17.11 PRIMARY OSTEOARTHRITIS OF RIGHT KNEE: Primary | ICD-10-CM

## 2019-06-05 PROCEDURE — 20610 DRAIN/INJ JOINT/BURSA W/O US: CPT | Performed by: PHYSICIAN ASSISTANT

## 2019-06-05 PROCEDURE — 99213 OFFICE O/P EST LOW 20 MIN: CPT | Performed by: PHYSICIAN ASSISTANT

## 2019-06-05 RX ORDER — BETAMETHASONE SODIUM PHOSPHATE AND BETAMETHASONE ACETATE 3; 3 MG/ML; MG/ML
12 INJECTION, SUSPENSION INTRA-ARTICULAR; INTRALESIONAL; INTRAMUSCULAR; SOFT TISSUE ONCE
Status: COMPLETED | OUTPATIENT
Start: 2019-06-05 | End: 2019-06-05

## 2019-06-05 RX ADMIN — BETAMETHASONE SODIUM PHOSPHATE AND BETAMETHASONE ACETATE 12 MG: 3; 3 INJECTION, SUSPENSION INTRA-ARTICULAR; INTRALESIONAL; INTRAMUSCULAR; SOFT TISSUE at 16:11

## 2019-06-05 NOTE — PROGRESS NOTES
Betamethasone:  NDC: 4241-3230-20  Lot Number: [id number]  Expiration Date: 4/20    Right and left knees

## 2019-06-05 NOTE — PROGRESS NOTES
Patient Name: Dara Soliz Dr Record Number: [id number]  YOB: 1940  Date of Encounter: 6/5/2019     Chief Complaint   Patient presents with    Follow-up     Bilat knee cortisone injection; 3/13/19. History of Present Illness:   Ms. Jasen Urbina is here in follow up regarding her chronic bilateral knee pain. Patient receives cortisone injections about every 3 months. She states the injections don't work very well. She just recently started having increased pain and would like repeat injections. She denies any recent injury. Pain is worse with activity. She is still using her rolling walker. The patient's past medical history, medications, allergies, family history, social history, and review of systems have been reviewed, and dated and are recorded in the chart under the 'MEDIA\" tab. Physical Exam:    Ms. Jasen Urbina appears well, she is in no apparent distress, she demonstrates appropriate mood & affect. She is alert and oriented to person, place and time. BP (!) 148/77   Pulse 92   Ht 5' (1.524 m)   Wt 244 lb (110.7 kg)   LMP 05/20/1991   BMI 47.65 kg/m²     On examination of patient's knees bilaterally there is very mild swelling. Chest tenderness on palpation of the medial and lateral joint lines. She has very good range of motion from 0-115° bilaterally. Her strength is 4/5. Orders:  Orders Placed This Encounter   Procedures    [place] ARTHROCENTESIS ASPIR&/INJ MAJOR JT/BURSA W/O US       Impression:   Diagnosis Orders   1. Primary osteoarthritis of right knee  betamethasone acetate-betamethasone sodium phosphate (CELESTONE) injection 12 mg    [place] ARTHROCENTESIS ASPIR&/INJ MAJOR JT/BURSA W/O US   2.  Primary osteoarthritis of left knee  betamethasone acetate-betamethasone sodium phosphate (CELESTONE) injection 12 mg    [place] ARTHROCENTESIS ASPIR&/INJ MAJOR JT/BURSA W/O US       Injections:  After discussing the risks and benefits of cortisone injection including increased pain, drug reaction, infection, bleeding, blood glucose elevation, lack of improvement and neurovascular injury she agreed to receive one today. Questions were encouraged and answered. The correct patient, procedure, site and side were identified. Both knees were prepped with Betadine and 2 mL of betamethasone (12mg) mixed with 5 mL 1% lidocaine plain (50mg) were instilled with careful aspiration and injection under aseptic technique. The skin was then cleaned again with alcohol and a sterile adhesive dressing was applied. She tolerated this well and was instructed regarding post-injection care of icing and decreased activity as necessary. Treatment Plan:    Patient presented today for repeat bilateral knee cortisone injections. Her pain is secondary to osteoarthritis. She is not yet ready to discuss TKA. She is not interested in joint fluid injections. Both knees received cortisone injections at today's visit as recorded above in the procedure note. She was given typical post injection precautions. She will follow-up as needed. Nghia Garcia was informed of the results of any imaging. We discussed treatment options and a time was given to answer questions. A plan was proposed and Nghia Garcia understand and accepts this course of care. Electronically signed by Sophia White PA-C on 4/5/0350  Board Certified [facility]    Please note that portions of this note were completed with a voice recognition program.  Efforts were made to edit the dictations but occasionally words are mis-transcribed.

## 2019-08-28 ENCOUNTER — OFFICE VISIT (OUTPATIENT)
Dept: ORTHOPEDIC SURGERY | Age: 79
End: 2019-08-28
Payer: MEDICARE

## 2019-08-28 VITALS
BODY MASS INDEX: 49.16 KG/M2 | SYSTOLIC BLOOD PRESSURE: 132 MMHG | HEART RATE: 87 BPM | DIASTOLIC BLOOD PRESSURE: 72 MMHG | HEIGHT: 60 IN | WEIGHT: 250.4 LBS

## 2019-08-28 DIAGNOSIS — M17.11 PRIMARY OSTEOARTHRITIS OF RIGHT KNEE: Primary | ICD-10-CM

## 2019-08-28 DIAGNOSIS — M17.12 PRIMARY OSTEOARTHRITIS OF LEFT KNEE: ICD-10-CM

## 2019-08-28 PROCEDURE — 99213 OFFICE O/P EST LOW 20 MIN: CPT | Performed by: PHYSICIAN ASSISTANT

## 2019-08-28 PROCEDURE — 20610 DRAIN/INJ JOINT/BURSA W/O US: CPT | Performed by: PHYSICIAN ASSISTANT

## 2019-08-28 RX ORDER — BETAMETHASONE SODIUM PHOSPHATE AND BETAMETHASONE ACETATE 3; 3 MG/ML; MG/ML
12 INJECTION, SUSPENSION INTRA-ARTICULAR; INTRALESIONAL; INTRAMUSCULAR; SOFT TISSUE ONCE
Status: COMPLETED | OUTPATIENT
Start: 2019-08-28 | End: 2019-08-28

## 2019-08-28 RX ADMIN — BETAMETHASONE SODIUM PHOSPHATE AND BETAMETHASONE ACETATE 12 MG: 3; 3 INJECTION, SUSPENSION INTRA-ARTICULAR; INTRALESIONAL; INTRAMUSCULAR; SOFT TISSUE at 16:14

## 2019-08-28 RX ADMIN — BETAMETHASONE SODIUM PHOSPHATE AND BETAMETHASONE ACETATE 12 MG: 3; 3 INJECTION, SUSPENSION INTRA-ARTICULAR; INTRALESIONAL; INTRAMUSCULAR; SOFT TISSUE at 16:13

## 2019-08-28 NOTE — PROGRESS NOTES
Betamethasone:  NDC: 3034-6294-69  Lot Number: [id number]  Expiration Date: 5/20     Right and left knees

## 2019-08-28 NOTE — PROGRESS NOTES
Patient Name: Dara Soliz Dr Record Number: [id number]  [place]: 1940  Date of Encounter: 8/28/2019     Chief Complaint   Patient presents with    Follow-up     12 week f/u b/l knee       History of Present Illness:   Ms. Mumtaz Virk is here in follow up regarding her chronic bilateral knee pain. Patient receives cortisone injections about every 3 months. She states the injections are still giving her some relief. She just recently started having increased pain and would like repeat injections. She denies any recent injury. Pain is worse with activity. She is still using her rolling walker. The patient's past medical history, medications, allergies, family history, social history, and review of systems have been reviewed, and dated and are recorded in the chart under the 'MEDIA\" tab. Physical Exam:    Ms. Mumtaz Virk appears well, she is in no apparent distress, she demonstrates appropriate mood & affect. She is alert and oriented to person, place and time. /72   Pulse 87   Ht 5' (1.524 m)   Wt 250 lb 6.4 oz (113.6 kg)   LMP 05/20/1991   BMI 48.90 kg/m²     On examination of patient's knees bilaterally there is very mild swelling. She has tenderness on palpation of both the medial and lateral joint lines. She lacks maybe a few degrees of extension bilaterally but has good flexion to 115 degrees. She has 4/5 motor strength with flexion and extension. Orders:  Orders Placed This Encounter   Procedures    [place] ARTHROCENTESIS ASPIR&/INJ MAJOR JT/BURSA W/O US       Impression:   Diagnosis Orders   1. Primary osteoarthritis of right knee  betamethasone acetate-betamethasone sodium phosphate (CELESTONE) injection 12 mg    [place] ARTHROCENTESIS ASPIR&/INJ MAJOR JT/BURSA W/O US   2.  Primary osteoarthritis of left knee  [place] ARTHROCENTESIS ASPIR&/INJ MAJOR JT/BURSA W/O US    betamethasone acetate-betamethasone sodium phosphate (CELESTONE) injection 12 mg

## 2019-10-03 ENCOUNTER — TELEPHONE (OUTPATIENT)
Dept: ORTHOPEDIC SURGERY | Age: 79
End: 2019-10-03

## 2019-10-03 DIAGNOSIS — M17.0 PRIMARY OSTEOARTHRITIS OF BOTH KNEES: Primary | ICD-10-CM

## 2019-10-09 ENCOUNTER — TELEPHONE (OUTPATIENT)
Dept: ORTHOPEDIC SURGERY | Age: 79
End: 2019-10-09

## 2019-10-16 ENCOUNTER — OFFICE VISIT (OUTPATIENT)
Dept: ORTHOPEDIC SURGERY | Age: 79
End: 2019-10-16
Payer: MEDICARE

## 2019-10-16 VITALS — BODY MASS INDEX: 49.28 KG/M2 | HEIGHT: 60 IN | WEIGHT: 251 LBS

## 2019-10-16 DIAGNOSIS — M17.11 PRIMARY OSTEOARTHRITIS OF RIGHT KNEE: Primary | ICD-10-CM

## 2019-10-16 DIAGNOSIS — M17.12 PRIMARY OSTEOARTHRITIS OF LEFT KNEE: ICD-10-CM

## 2019-10-16 PROCEDURE — 20610 DRAIN/INJ JOINT/BURSA W/O US: CPT | Performed by: PHYSICIAN ASSISTANT

## 2019-10-16 RX ORDER — HYALURONATE SODIUM 10 MG/ML
20 SYRINGE (ML) INTRAARTICULAR ONCE
Status: COMPLETED | OUTPATIENT
Start: 2019-10-16 | End: 2019-10-16

## 2019-10-16 RX ADMIN — Medication 20 MG: at 16:48

## 2019-10-16 RX ADMIN — Medication 20 MG: at 16:52

## 2019-10-23 ENCOUNTER — OFFICE VISIT (OUTPATIENT)
Dept: ORTHOPEDIC SURGERY | Age: 79
End: 2019-10-23
Payer: MEDICARE

## 2019-10-23 VITALS — WEIGHT: 251 LBS | BODY MASS INDEX: 49.28 KG/M2 | HEIGHT: 60 IN

## 2019-10-23 DIAGNOSIS — M17.12 PRIMARY OSTEOARTHRITIS OF LEFT KNEE: ICD-10-CM

## 2019-10-23 DIAGNOSIS — M17.11 PRIMARY OSTEOARTHRITIS OF RIGHT KNEE: Primary | ICD-10-CM

## 2019-10-23 PROCEDURE — 20610 DRAIN/INJ JOINT/BURSA W/O US: CPT | Performed by: PHYSICIAN ASSISTANT

## 2019-10-23 RX ORDER — HYALURONATE SODIUM 10 MG/ML
20 SYRINGE (ML) INTRAARTICULAR ONCE
Status: SHIPPED | OUTPATIENT
Start: 2019-10-23

## 2019-10-30 ENCOUNTER — OFFICE VISIT (OUTPATIENT)
Dept: ORTHOPEDIC SURGERY | Age: 79
End: 2019-10-30
Payer: MEDICARE

## 2019-10-30 VITALS — WEIGHT: 247 LBS | BODY MASS INDEX: 48.49 KG/M2 | HEIGHT: 60 IN

## 2019-10-30 DIAGNOSIS — M17.12 PRIMARY OSTEOARTHRITIS OF LEFT KNEE: ICD-10-CM

## 2019-10-30 DIAGNOSIS — M17.11 PRIMARY OSTEOARTHRITIS OF RIGHT KNEE: Primary | ICD-10-CM

## 2019-10-30 PROCEDURE — 20610 DRAIN/INJ JOINT/BURSA W/O US: CPT | Performed by: PHYSICIAN ASSISTANT

## 2019-10-30 RX ORDER — HYALURONATE SODIUM 10 MG/ML
20 SYRINGE (ML) INTRAARTICULAR ONCE
Status: COMPLETED | OUTPATIENT
Start: 2019-10-30 | End: 2019-10-30

## 2019-10-30 RX ADMIN — Medication 20 MG: at 15:22

## 2019-10-30 RX ADMIN — Medication 20 MG: at 15:24

## 2019-12-02 ENCOUNTER — TELEPHONE (OUTPATIENT)
Dept: FAMILY MEDICINE CLINIC | Age: 79
End: 2019-12-02

## 2019-12-02 DIAGNOSIS — I10 ESSENTIAL HYPERTENSION: Primary | ICD-10-CM

## 2019-12-02 DIAGNOSIS — E78.2 MIXED HYPERLIPIDEMIA: ICD-10-CM

## 2019-12-02 DIAGNOSIS — R73.03 PREDIABETES: ICD-10-CM

## 2019-12-09 ENCOUNTER — TELEPHONE (OUTPATIENT)
Dept: ORTHOPEDIC SURGERY | Age: 79
End: 2019-12-09

## 2019-12-17 DIAGNOSIS — E78.5 HYPERLIPIDEMIA, UNSPECIFIED HYPERLIPIDEMIA TYPE: ICD-10-CM

## 2019-12-17 DIAGNOSIS — R73.03 PREDIABETES: ICD-10-CM

## 2019-12-17 DIAGNOSIS — I10 ESSENTIAL HYPERTENSION: ICD-10-CM

## 2019-12-17 RX ORDER — METFORMIN HYDROCHLORIDE 500 MG/1
TABLET, EXTENDED RELEASE ORAL
Qty: 90 TABLET | Refills: 1 | Status: SHIPPED | OUTPATIENT
Start: 2019-12-17 | End: 2019-12-18 | Stop reason: SDUPTHER

## 2019-12-17 RX ORDER — CHLORTHALIDONE 25 MG/1
TABLET ORAL
Qty: 90 TABLET | Refills: 1 | Status: SHIPPED | OUTPATIENT
Start: 2019-12-17 | End: 2019-12-18 | Stop reason: SDUPTHER

## 2019-12-17 RX ORDER — LISINOPRIL 40 MG/1
TABLET ORAL
Qty: 90 TABLET | Refills: 3 | Status: SHIPPED | OUTPATIENT
Start: 2019-12-17 | End: 2019-12-18 | Stop reason: SDUPTHER

## 2019-12-17 RX ORDER — CELECOXIB 200 MG/1
CAPSULE ORAL
Qty: 90 CAPSULE | Refills: 1 | Status: SHIPPED | OUTPATIENT
Start: 2019-12-17 | End: 2019-12-18 | Stop reason: SDUPTHER

## 2019-12-17 RX ORDER — SIMVASTATIN 20 MG
TABLET ORAL
Qty: 90 TABLET | Refills: 1 | Status: SHIPPED | OUTPATIENT
Start: 2019-12-17 | End: 2019-12-18 | Stop reason: SDUPTHER

## 2019-12-17 RX ORDER — POTASSIUM CHLORIDE 20 MEQ/1
TABLET, EXTENDED RELEASE ORAL
Qty: 90 TABLET | Refills: 1 | Status: SHIPPED | OUTPATIENT
Start: 2019-12-17 | End: 2019-12-18 | Stop reason: SDUPTHER

## 2019-12-18 ENCOUNTER — OFFICE VISIT (OUTPATIENT)
Dept: FAMILY MEDICINE CLINIC | Age: 79
End: 2019-12-18
Payer: MEDICARE

## 2019-12-18 VITALS
DIASTOLIC BLOOD PRESSURE: 74 MMHG | BODY MASS INDEX: 47.91 KG/M2 | WEIGHT: 244 LBS | HEART RATE: 94 BPM | SYSTOLIC BLOOD PRESSURE: 144 MMHG | HEIGHT: 60 IN | OXYGEN SATURATION: 96 %

## 2019-12-18 DIAGNOSIS — E78.2 MIXED HYPERLIPIDEMIA: ICD-10-CM

## 2019-12-18 DIAGNOSIS — R73.03 PREDIABETES: ICD-10-CM

## 2019-12-18 DIAGNOSIS — Z23 NEED FOR VACCINATION: ICD-10-CM

## 2019-12-18 DIAGNOSIS — I10 ESSENTIAL HYPERTENSION: ICD-10-CM

## 2019-12-18 DIAGNOSIS — E66.01 MORBID OBESITY WITH BMI OF 45.0-49.9, ADULT (HCC): ICD-10-CM

## 2019-12-18 DIAGNOSIS — E78.5 HYPERLIPIDEMIA, UNSPECIFIED HYPERLIPIDEMIA TYPE: ICD-10-CM

## 2019-12-18 DIAGNOSIS — Z12.31 BREAST CANCER SCREENING BY MAMMOGRAM: ICD-10-CM

## 2019-12-18 DIAGNOSIS — Z00.00 MEDICARE ANNUAL WELLNESS VISIT, SUBSEQUENT: Primary | ICD-10-CM

## 2019-12-18 LAB
A/G RATIO: 1.6 (ref 1.1–2.2)
ALBUMIN SERPL-MCNC: 4.7 G/DL (ref 3.4–5)
ALP BLD-CCNC: 95 U/L (ref 40–129)
ALT SERPL-CCNC: 23 U/L (ref 10–40)
ANION GAP SERPL CALCULATED.3IONS-SCNC: 15 MMOL/L (ref 3–16)
AST SERPL-CCNC: 29 U/L (ref 15–37)
BASOPHILS ABSOLUTE: 0 K/UL (ref 0–0.2)
BASOPHILS RELATIVE PERCENT: 0.1 %
BILIRUB SERPL-MCNC: 1.5 MG/DL (ref 0–1)
BUN BLDV-MCNC: 13 MG/DL (ref 7–20)
CALCIUM SERPL-MCNC: 10.9 MG/DL (ref 8.3–10.6)
CHLORIDE BLD-SCNC: 95 MMOL/L (ref 99–110)
CHOLESTEROL, TOTAL: 186 MG/DL (ref 0–199)
CO2: 31 MMOL/L (ref 21–32)
CREAT SERPL-MCNC: 0.5 MG/DL (ref 0.6–1.2)
EOSINOPHILS ABSOLUTE: 0.2 K/UL (ref 0–0.6)
EOSINOPHILS RELATIVE PERCENT: 1.7 %
GFR AFRICAN AMERICAN: >60
GFR NON-AFRICAN AMERICAN: >60
GLOBULIN: 2.9 G/DL
GLUCOSE BLD-MCNC: 110 MG/DL (ref 70–99)
HCT VFR BLD CALC: 48 % (ref 36–48)
HDLC SERPL-MCNC: 60 MG/DL (ref 40–60)
HEMOGLOBIN: 15.8 G/DL (ref 12–16)
LDL CHOLESTEROL CALCULATED: 66 MG/DL
LYMPHOCYTES ABSOLUTE: 4.5 K/UL (ref 1–5.1)
LYMPHOCYTES RELATIVE PERCENT: 45.4 %
MCH RBC QN AUTO: 30.6 PG (ref 26–34)
MCHC RBC AUTO-ENTMCNC: 33 G/DL (ref 31–36)
MCV RBC AUTO: 92.7 FL (ref 80–100)
MONOCYTES ABSOLUTE: 1 K/UL (ref 0–1.3)
MONOCYTES RELATIVE PERCENT: 9.6 %
NEUTROPHILS ABSOLUTE: 4.3 K/UL (ref 1.7–7.7)
NEUTROPHILS RELATIVE PERCENT: 43.2 %
PDW BLD-RTO: 14.5 % (ref 12.4–15.4)
PLATELET # BLD: 191 K/UL (ref 135–450)
PMV BLD AUTO: 10.7 FL (ref 5–10.5)
POTASSIUM SERPL-SCNC: 4 MMOL/L (ref 3.5–5.1)
RBC # BLD: 5.18 M/UL (ref 4–5.2)
SODIUM BLD-SCNC: 141 MMOL/L (ref 136–145)
TOTAL PROTEIN: 7.6 G/DL (ref 6.4–8.2)
TRIGL SERPL-MCNC: 300 MG/DL (ref 0–150)
TSH SERPL DL<=0.05 MIU/L-ACNC: 3.04 UIU/ML (ref 0.27–4.2)
VLDLC SERPL CALC-MCNC: 60 MG/DL
WBC # BLD: 9.9 K/UL (ref 4–11)

## 2019-12-18 PROCEDURE — 90653 IIV ADJUVANT VACCINE IM: CPT | Performed by: FAMILY MEDICINE

## 2019-12-18 PROCEDURE — G0008 ADMIN INFLUENZA VIRUS VAC: HCPCS | Performed by: FAMILY MEDICINE

## 2019-12-18 PROCEDURE — G0439 PPPS, SUBSEQ VISIT: HCPCS | Performed by: FAMILY MEDICINE

## 2019-12-18 RX ORDER — CELECOXIB 200 MG/1
CAPSULE ORAL
Qty: 90 CAPSULE | Refills: 3 | Status: SHIPPED | OUTPATIENT
Start: 2019-12-18 | End: 2020-10-20 | Stop reason: ALTCHOICE

## 2019-12-18 RX ORDER — POTASSIUM CHLORIDE 20 MEQ/1
TABLET, EXTENDED RELEASE ORAL
Qty: 90 TABLET | Refills: 3 | Status: ON HOLD | OUTPATIENT
Start: 2019-12-18 | End: 2022-02-06 | Stop reason: HOSPADM

## 2019-12-18 RX ORDER — LISINOPRIL 40 MG/1
TABLET ORAL
Qty: 90 TABLET | Refills: 3 | Status: SHIPPED | OUTPATIENT
Start: 2019-12-18 | End: 2020-12-24

## 2019-12-18 RX ORDER — METFORMIN HYDROCHLORIDE 500 MG/1
TABLET, EXTENDED RELEASE ORAL
Qty: 90 TABLET | Refills: 3 | Status: SHIPPED | OUTPATIENT
Start: 2019-12-18 | End: 2021-01-08

## 2019-12-18 RX ORDER — SIMVASTATIN 20 MG
TABLET ORAL
Qty: 90 TABLET | Refills: 3 | Status: SHIPPED | OUTPATIENT
Start: 2019-12-18 | End: 2020-12-24

## 2019-12-18 RX ORDER — CHLORTHALIDONE 25 MG/1
TABLET ORAL
Qty: 90 TABLET | Refills: 3 | Status: SHIPPED | OUTPATIENT
Start: 2019-12-18 | End: 2020-12-24

## 2019-12-18 ASSESSMENT — LIFESTYLE VARIABLES
AUDIT-C TOTAL SCORE: 1
HOW OFTEN DO YOU HAVE A DRINK CONTAINING ALCOHOL: 1
HOW OFTEN DO YOU HAVE SIX OR MORE DRINKS ON ONE OCCASION: 0
HOW MANY STANDARD DRINKS CONTAINING ALCOHOL DO YOU HAVE ON A TYPICAL DAY: 0

## 2019-12-18 ASSESSMENT — PATIENT HEALTH QUESTIONNAIRE - PHQ9
SUM OF ALL RESPONSES TO PHQ QUESTIONS 1-9: 0
SUM OF ALL RESPONSES TO PHQ QUESTIONS 1-9: 0

## 2019-12-19 LAB
ESTIMATED AVERAGE GLUCOSE: 111.2 MG/DL
HBA1C MFR BLD: 5.5 %

## 2019-12-20 ENCOUNTER — OFFICE VISIT (OUTPATIENT)
Dept: ORTHOPEDIC SURGERY | Age: 79
End: 2019-12-20
Payer: MEDICARE

## 2019-12-20 VITALS
DIASTOLIC BLOOD PRESSURE: 78 MMHG | HEIGHT: 60 IN | SYSTOLIC BLOOD PRESSURE: 147 MMHG | WEIGHT: 244 LBS | HEART RATE: 75 BPM | BODY MASS INDEX: 47.91 KG/M2

## 2019-12-20 DIAGNOSIS — M17.0 PRIMARY OSTEOARTHRITIS OF BOTH KNEES: Primary | ICD-10-CM

## 2019-12-20 PROCEDURE — 20610 DRAIN/INJ JOINT/BURSA W/O US: CPT | Performed by: ORTHOPAEDIC SURGERY

## 2019-12-20 RX ORDER — BETAMETHASONE SODIUM PHOSPHATE AND BETAMETHASONE ACETATE 3; 3 MG/ML; MG/ML
12 INJECTION, SUSPENSION INTRA-ARTICULAR; INTRALESIONAL; INTRAMUSCULAR; SOFT TISSUE ONCE
Status: COMPLETED | OUTPATIENT
Start: 2019-12-20 | End: 2019-12-20

## 2019-12-20 RX ORDER — BETAMETHASONE SODIUM PHOSPHATE AND BETAMETHASONE ACETATE 3; 3 MG/ML; MG/ML
12 INJECTION, SUSPENSION INTRA-ARTICULAR; INTRALESIONAL; INTRAMUSCULAR; SOFT TISSUE ONCE
Status: SHIPPED | OUTPATIENT
Start: 2019-12-20

## 2019-12-20 RX ADMIN — BETAMETHASONE SODIUM PHOSPHATE AND BETAMETHASONE ACETATE 12 MG: 3; 3 INJECTION, SUSPENSION INTRA-ARTICULAR; INTRALESIONAL; INTRAMUSCULAR; SOFT TISSUE at 13:54

## 2019-12-20 RX ADMIN — BETAMETHASONE SODIUM PHOSPHATE AND BETAMETHASONE ACETATE 12 MG: 3; 3 INJECTION, SUSPENSION INTRA-ARTICULAR; INTRALESIONAL; INTRAMUSCULAR; SOFT TISSUE at 13:55

## 2020-03-11 ENCOUNTER — OFFICE VISIT (OUTPATIENT)
Dept: ORTHOPEDIC SURGERY | Age: 80
End: 2020-03-11
Payer: MEDICARE

## 2020-03-11 VITALS
BODY MASS INDEX: 47.32 KG/M2 | WEIGHT: 241 LBS | HEIGHT: 60 IN | DIASTOLIC BLOOD PRESSURE: 81 MMHG | SYSTOLIC BLOOD PRESSURE: 143 MMHG | HEART RATE: 88 BPM

## 2020-03-11 PROCEDURE — 99999 PR OFFICE/OUTPT VISIT,PROCEDURE ONLY: CPT | Performed by: ORTHOPAEDIC SURGERY

## 2020-03-11 PROCEDURE — 20610 DRAIN/INJ JOINT/BURSA W/O US: CPT | Performed by: ORTHOPAEDIC SURGERY

## 2020-03-11 RX ORDER — BETAMETHASONE SODIUM PHOSPHATE AND BETAMETHASONE ACETATE 3; 3 MG/ML; MG/ML
12 INJECTION, SUSPENSION INTRA-ARTICULAR; INTRALESIONAL; INTRAMUSCULAR; SOFT TISSUE ONCE
Status: COMPLETED | OUTPATIENT
Start: 2020-03-11 | End: 2020-03-11

## 2020-03-11 RX ORDER — LIDOCAINE HYDROCHLORIDE 10 MG/ML
5 INJECTION, SOLUTION INFILTRATION; PERINEURAL ONCE
Status: COMPLETED | OUTPATIENT
Start: 2020-03-11 | End: 2020-03-11

## 2020-03-11 RX ADMIN — BETAMETHASONE SODIUM PHOSPHATE AND BETAMETHASONE ACETATE 12 MG: 3; 3 INJECTION, SUSPENSION INTRA-ARTICULAR; INTRALESIONAL; INTRAMUSCULAR; SOFT TISSUE at 14:55

## 2020-03-11 RX ADMIN — LIDOCAINE HYDROCHLORIDE 5 ML: 10 INJECTION, SOLUTION INFILTRATION; PERINEURAL at 14:56

## 2020-03-11 RX ADMIN — LIDOCAINE HYDROCHLORIDE 5 ML: 10 INJECTION, SOLUTION INFILTRATION; PERINEURAL at 14:55

## 2020-03-11 RX ADMIN — BETAMETHASONE SODIUM PHOSPHATE AND BETAMETHASONE ACETATE 12 MG: 3; 3 INJECTION, SUSPENSION INTRA-ARTICULAR; INTRALESIONAL; INTRAMUSCULAR; SOFT TISSUE at 14:54

## 2020-03-11 NOTE — PROGRESS NOTES
distally. Neurological: The patient has good coordination. There is no focal weakness or sensory deficit. Examination of bilateral knee is as follows: Inspection & Skin: Mild varus alignment bilaterally. Normal muscle bulk and tone for patient's age and activity level. There are no rashes, ulcerations or lesions. No significant effusion or erythema. Palpation:  Mild tenderness along the lateral joint line. Moderate tenderness along the medial joint line. Extensor mechanism intact on palpation. Range of Motion:  Extension 0°. Flexion 90°. Strength:     Quad and hamstrings at least 4/5.  Hip and ankle motor function are grossly intact. Additional Comments:    does not open to valgus or varus stress at 0 or 30°    Posterior tibial pulses are +2/4 capillary refill is brisk sensation is intact.  No signs or symptoms of DVT or infection clinically.  Ankle dorsiflexion and plantarflexion show functional range of motion. Gait: Decreased stride length and weight shift, favoring her left side. Adaptive device: Walker. Office Orders/Procedures:  No orders of the defined types were placed in this encounter. I discussed the option of cortisone injection and its associated risks and benefits after reviewing patients use of current prescription or over-the-counter analgesics for attempted pain alleviation. Patient agreed to receive a cortisone injection in the clinic today. I informed patient that the potential to improve pain and function varies in response amongst patients. Both knees were prepped with betadine and 2 mL of betamethasone mixed with 5 mL 1% lidocaine plain were instilled with careful aspiration and injection under aseptic technique. The skin was again cleaned with alcohol and covered with a sterile adhesive bandage over the entry site.  Patient tolerated the injection well and was instructed to call back over the next 3-4 days and leave a message regarding how much or how little the injection seemed to help. Questions were encouraged and answered to the best of my ability and with patient satisfaction. Impression:   Diagnosis Orders   1. Primary osteoarthritis of both knees          Treatment Plan:  Patient received cortisone as noted above. We will continue with conservative treatment, but may reconsider other treatment options should the injection lose its effectiveness. Patient voiced understanding and was agreeable with today's plan for treatment. Patient had the opportunity to ask questions, all of which were answered to the best of my ability and with patient satisfaction. Patient understands and is agreeable with the care plan following today's visit. Patient is to schedule an appointment for any new or worsening symptoms. By signing my name below, Genevieve Almazan, attest that this documentation has been prepared under the direction and in the presence of Kylee Berry MD.   Electronically Signed: Linda Stacy, 3/11/20, 2:37 PM EDT. Grupo Chacon MD, personally performed the services described in this documentation. All medical record entries made by the linda were at my direction and in my presence. I have reviewed the chart and discharge instructions (if applicable) and agree that the record reflects my personal performance and is accurate and complete. Kylee Berry MD, 3/23/20, 8:40 AM EDT. Some documentation was done using voice recognition dragon software. Every effort was made to ensure accuracy; however, inadvertent unintentional computerized transcription errors may be present.

## 2020-04-28 RX ORDER — FLUTICASONE PROPIONATE 50 MCG
2 SPRAY, SUSPENSION (ML) NASAL DAILY
Qty: 1 BOTTLE | Refills: 3 | Status: ON HOLD | OUTPATIENT
Start: 2020-04-28 | End: 2022-02-04

## 2020-04-30 RX ORDER — FAMOTIDINE 20 MG/1
20 TABLET, FILM COATED ORAL 2 TIMES DAILY
Qty: 60 TABLET | Refills: 3 | Status: SHIPPED | OUTPATIENT
Start: 2020-04-30 | End: 2020-09-14

## 2020-06-11 ENCOUNTER — NURSE TRIAGE (OUTPATIENT)
Dept: OTHER | Facility: CLINIC | Age: 80
End: 2020-06-11

## 2020-06-11 NOTE — TELEPHONE ENCOUNTER
79 y.o pt calling with multiple GI symptoms;  abdominal pain on right side right below the waist,  Decreased appetite, heartburn, constipation,cough  Since February, and SOB. Concerned about lack of appetite and abdomen pain. Abd pain is worse when lying down, bothers her at night. Has increased bran in diet. Wants to be seen. Reason for Disposition   MODERATE OR MILD pain that comes and goes (cramps) lasts > 24 hours    Answer Assessment - Initial Assessment Questions  1. LOCATION: \"Where does it hurt? \"      Right side; below the waist  2. RADIATION: \"Does the pain shoot anywhere else? \" (e.g., chest, back)      No  3. ONSET: \"When did the pain begin? \" (e.g., minutes, hours or days ago)       2 weeks  4. SUDDEN: \"Gradual or sudden onset? \"      Gradual and intermittent; always present when I lay in bed  5. PATTERN \"Does the pain come and go, or is it constant? \"     - If constant: \"Is it getting better, staying the same, or worsening? \"       (Note: Constant means the pain never goes away completely; most serious pain is constant and it progresses)      - If intermittent: \"How long does it last?\" \"Do you have pain now? \"      (Note: Intermittent means the pain goes away completely between bouts)      intermittent  6. SEVERITY: \"How bad is the pain? \"  (e.g., Scale 1-10; mild, moderate, or severe)    - MILD (1-3): doesn't interfere with normal activities, abdomen soft and not tender to touch     - MODERATE (4-7): interferes with normal activities or awakens from sleep, tender to touch     - SEVERE (8-10): excruciating pain, doubled over, unable to do any normal activities       moderate  7. RECURRENT SYMPTOM: \"Have you ever had this type of abdominal pain before? \" If so, ask: \"When was the last time? \" and \"What happened that time? \"       No  8. CAUSE: \"What do you think is causing the abdominal pain? \"      No idea  9. RELIEVING/AGGRAVATING FACTORS: \"What makes it better or worse? \" (e.g., movement, antacids, bowel

## 2020-06-12 ENCOUNTER — VIRTUAL VISIT (OUTPATIENT)
Dept: FAMILY MEDICINE CLINIC | Age: 80
End: 2020-06-12
Payer: MEDICARE

## 2020-06-12 ENCOUNTER — TELEPHONE (OUTPATIENT)
Dept: FAMILY MEDICINE CLINIC | Age: 80
End: 2020-06-12

## 2020-06-12 PROCEDURE — 99443 PR PHYS/QHP TELEPHONE EVALUATION 21-30 MIN: CPT | Performed by: FAMILY MEDICINE

## 2020-06-12 RX ORDER — PANTOPRAZOLE SODIUM 20 MG/1
20 TABLET, DELAYED RELEASE ORAL
Qty: 30 TABLET | Refills: 3 | Status: SHIPPED | OUTPATIENT
Start: 2020-06-12 | End: 2021-03-10

## 2020-06-12 ASSESSMENT — PATIENT HEALTH QUESTIONNAIRE - PHQ9
SUM OF ALL RESPONSES TO PHQ9 QUESTIONS 1 & 2: 0
SUM OF ALL RESPONSES TO PHQ QUESTIONS 1-9: 0
SUM OF ALL RESPONSES TO PHQ QUESTIONS 1-9: 0
1. LITTLE INTEREST OR PLEASURE IN DOING THINGS: 0
2. FEELING DOWN, DEPRESSED OR HOPELESS: 0

## 2020-06-12 NOTE — PROGRESS NOTES
Tetracyclines & Related Nausea Only       Prior to Visit Medications    Medication Sig Taking? Authorizing Provider   pantoprazole (PROTONIX) 20 MG tablet Take 1 tablet by mouth every morning (before breakfast) Yes Eliseo Saavedra MD   famotidine (PEPCID) 20 MG tablet Take 1 tablet by mouth 2 times daily Yes Eliseo Saavedra MD   fluticasone (FLONASE) 50 MCG/ACT nasal spray 2 sprays by Nasal route daily Yes Eliseo Saavedra MD   Omega-3 Fatty Acids (FISH OIL PO) Take by mouth Yes Historical Provider, MD   metoprolol tartrate (LOPRESSOR) 25 MG tablet TAKE ONE TABLET BY MOUTH TWICE A DAY Yes Eliseo Saavedra MD   celecoxib (CELEBREX) 200 MG capsule TAKE ONE CAPSULE BY MOUTH DAILY Yes Eliseo Saavedra MD   lisinopril (PRINIVIL;ZESTRIL) 40 MG tablet TAKE ONE TABLET BY MOUTH DAILY Yes Eliseo Saavedra MD   simvastatin (ZOCOR) 20 MG tablet TAKE ONE TABLET BY MOUTH ONCE NIGHTLY Yes Eliseo Saavedra MD   verapamil (CALAN SR) 180 MG extended release tablet TAKE ONE TABLET BY MOUTH TWICE A DAY Yes Eliseo Saavedra MD   metFORMIN (GLUCOPHAGE-XR) 500 MG extended release tablet TAKE ONE TABLET BY MOUTH DAILY WITH SUPPER Yes Eliseo Saavedra MD   chlorthalidone (HYGROTON) 25 MG tablet TAKE ONE TABLET BY MOUTH DAILY Yes Eliseo Saavedra MD   potassium chloride (KLOR-CON M) 20 MEQ extended release tablet TAKE ONE TABLET BY MOUTH DAILY Yes Eliseo Saavedra MD   Bioflavonoid Products (BIOFLEX PO) Take by mouth Yes Historical Provider, MD   Cholecalciferol (VITAMIN D3) 2000 UNITS CAPS Take by mouth Yes Historical Provider, MD   Ascorbic Acid (VITAMIN C WITH AZRA HIPS) 500 MG tablet Take 500 mg by mouth daily. Yes Historical Provider, MD   Multiple Vitamins-Minerals (RA VISION-DEBYB PRESERVE PO) Take  by mouth.  Yes Historical Provider, MD       Past Medical History:   Diagnosis Date    Allergic     Arthritis     Hyperlipidemia     Hypertension     Obesity     Pre-diabetes     S/P colonoscopy 1990       Social History     Tobacco Use    Smoking status: Never Smoker    Smokeless tobacco: Never Used   Substance Use Topics    Alcohol use: Yes     Comment: alessandro ferrara       Family History   Problem Relation Age of Onset    Heart Disease Mother        OBJECTIVE:  LMP 05/20/1991   GEN:  alert and pleasant , slightly short of breath just with talking  No expiratory cough  NEURO: Alert oriented to person/place/date and time , normal mood and affect, able to follow commands ,      ASSESSMENT/PLAN:  1. Abdominal discomfort, epigastric  Trial of Protonix before breakfast each morning  Patient is on Celebrex daily and stopped her Pepcid AC because she thought it was constipating her  - pantoprazole (PROTONIX) 20 MG tablet; Take 1 tablet by mouth every morning (before breakfast)  Dispense: 30 tablet; Refill: 3    2. Constipation, unspecified constipation type  Discussed staying hydrated, enough fruits and vegetables in diet and trying to walk more    3. SOB    Fasting labs and chest x-ray at the hospital prior to office visit next week  Unclear etiology, no chest pain, needs in office appointment    4. Essential hypertension  stable, needs repeat labs    5. Prediabetes  Repeat labs in the office [address] [place] [address] appointment    6.  Morbid obesity with BMI of 45.0-49.9, adult [facility])  May Be contributing to shortness of breath  Unable to exercise because of severe osteoarthritis of knees      22 minutes spent on phone conversation with this patient  She needs to be seen in the office with Dr. Jamey Vazquez  Needs in office labs also

## 2020-06-15 ENCOUNTER — TELEPHONE (OUTPATIENT)
Dept: FAMILY MEDICINE CLINIC | Age: 80
End: 2020-06-15

## 2020-06-15 ENCOUNTER — HOSPITAL ENCOUNTER (OUTPATIENT)
Age: 80
Discharge: HOME | DRG: 181 | End: 2020-06-15
Payer: MEDICARE

## 2020-06-15 LAB
A/G RATIO: 1 (ref 1.1–2.2)
ALBUMIN SERPL-MCNC: 3.5 G/DL (ref 3.4–5)
ALP BLD-CCNC: 319 U/L (ref 40–129)
ALT SERPL-CCNC: 52 U/L (ref 10–40)
ANION GAP SERPL CALCULATED.3IONS-SCNC: 17 MMOL/L (ref 3–16)
AST SERPL-CCNC: 142 U/L (ref 15–37)
BASOPHILS ABSOLUTE: 0.1 K/UL (ref 0–0.2)
BASOPHILS RELATIVE PERCENT: 0.8 %
BILIRUB SERPL-MCNC: 1.8 MG/DL (ref 0–1)
BUN BLDV-MCNC: 13 MG/DL (ref 7–20)
CALCIUM SERPL-MCNC: 9.4 MG/DL (ref 8.3–10.6)
CHLORIDE BLD-SCNC: 96 MMOL/L (ref 99–110)
CO2: 26 MMOL/L (ref 21–32)
CREAT SERPL-MCNC: <0.5 MG/DL (ref 0.6–1.2)
EOSINOPHILS ABSOLUTE: 0 K/UL (ref 0–0.6)
EOSINOPHILS RELATIVE PERCENT: 0.4 %
GFR AFRICAN AMERICAN: >60
GFR NON-AFRICAN AMERICAN: >60
GLOBULIN: 3.6 G/DL
GLUCOSE BLD-MCNC: 87 MG/DL (ref 70–99)
HCT VFR BLD CALC: 48.7 % (ref 36–48)
HEMOGLOBIN: 15.9 G/DL (ref 12–16)
LYMPHOCYTES ABSOLUTE: 2.1 K/UL (ref 1–5.1)
LYMPHOCYTES RELATIVE PERCENT: 26.5 %
MCH RBC QN AUTO: 30 PG (ref 26–34)
MCHC RBC AUTO-ENTMCNC: 32.7 G/DL (ref 31–36)
MCV RBC AUTO: 91.9 FL (ref 80–100)
MONOCYTES ABSOLUTE: 1.3 K/UL (ref 0–1.3)
MONOCYTES RELATIVE PERCENT: 16.7 %
NEUTROPHILS ABSOLUTE: 4.3 K/UL (ref 1.7–7.7)
NEUTROPHILS RELATIVE PERCENT: 55.6 %
PDW BLD-RTO: 16.1 % (ref 12.4–15.4)
PLATELET # BLD: 187 K/UL (ref 135–450)
PMV BLD AUTO: 11.6 FL (ref 5–10.5)
POTASSIUM SERPL-SCNC: 3.7 MMOL/L (ref 3.5–5.1)
PRO-BNP: 432 PG/ML (ref 0–449)
RBC # BLD: 5.31 M/UL (ref 4–5.2)
SODIUM BLD-SCNC: 139 MMOL/L (ref 136–145)
TOTAL PROTEIN: 7.1 G/DL (ref 6.4–8.2)
TSH REFLEX: 2.19 UIU/ML (ref 0.27–4.2)
WBC # BLD: 7.8 K/UL (ref 4–11)

## 2020-06-15 PROCEDURE — 83036 HEMOGLOBIN GLYCOSYLATED A1C: CPT

## 2020-06-15 PROCEDURE — 80053 COMPREHEN METABOLIC PANEL: CPT

## 2020-06-15 PROCEDURE — 85025 COMPLETE CBC W/AUTO DIFF WBC: CPT

## 2020-06-15 PROCEDURE — 84443 ASSAY THYROID STIM HORMONE: CPT

## 2020-06-15 PROCEDURE — 83880 ASSAY OF NATRIURETIC PEPTIDE: CPT

## 2020-06-15 PROCEDURE — 36415 COLL VENOUS BLD VENIPUNCTURE: CPT

## 2020-06-15 NOTE — TELEPHONE ENCOUNTER
Patient is at [facility] radiology and states she was to have a chest x-ray done due to SOB per Dr. Shikha Cortez- see progress notes for VV on 6/12/20. There are no new orders for chest x-ray and patient has an appointment tomorrow. Please fax order to [phone] and contact patient at [phone] when they have been sent.

## 2020-06-16 ENCOUNTER — OFFICE VISIT (OUTPATIENT)
Dept: FAMILY MEDICINE CLINIC | Age: 80
End: 2020-06-16
Payer: MEDICARE

## 2020-06-16 ENCOUNTER — HOSPITAL ENCOUNTER (INPATIENT)
Age: 80
LOS: 3 days | Discharge: HOME | DRG: 181 | End: 2020-06-19
Attending: INTERNAL MEDICINE | Admitting: INTERNAL MEDICINE
Payer: MEDICARE

## 2020-06-16 ENCOUNTER — APPOINTMENT (OUTPATIENT)
Dept: CT IMAGING | Age: 80
DRG: 181 | End: 2020-06-16
Payer: MEDICARE

## 2020-06-16 VITALS
HEIGHT: 60 IN | TEMPERATURE: 98 F | SYSTOLIC BLOOD PRESSURE: 118 MMHG | BODY MASS INDEX: 44.37 KG/M2 | DIASTOLIC BLOOD PRESSURE: 62 MMHG | HEART RATE: 84 BPM | WEIGHT: 226 LBS

## 2020-06-16 PROBLEM — R91.8 LUNG MASS: Status: ACTIVE | Noted: 2020-06-16

## 2020-06-16 PROBLEM — R06.02 SOB (SHORTNESS OF BREATH): Status: ACTIVE | Noted: 2020-06-16

## 2020-06-16 PROBLEM — R74.8 ELEVATED LIVER ENZYMES: Status: ACTIVE | Noted: 2020-06-16

## 2020-06-16 LAB
ALBUMIN SERPL-MCNC: 3.6 G/DL (ref 3.4–5)
ALP BLD-CCNC: 322 U/L (ref 40–129)
ALT SERPL-CCNC: 58 U/L (ref 10–40)
ANION GAP SERPL CALCULATED.3IONS-SCNC: 17 MMOL/L (ref 3–16)
APTT: 37.2 SEC (ref 24.2–36.2)
AST SERPL-CCNC: 145 U/L (ref 15–37)
BASOPHILS ABSOLUTE: 0.1 K/UL (ref 0–0.2)
BASOPHILS RELATIVE PERCENT: 0.7 %
BILIRUB SERPL-MCNC: 2 MG/DL (ref 0–1)
BILIRUBIN DIRECT: 0.9 MG/DL (ref 0–0.3)
BILIRUBIN, INDIRECT: 1.1 MG/DL (ref 0–1)
BUN BLDV-MCNC: 16 MG/DL (ref 7–20)
CALCIUM SERPL-MCNC: 9.7 MG/DL (ref 8.3–10.6)
CHLORIDE BLD-SCNC: 97 MMOL/L (ref 99–110)
CO2: 26 MMOL/L (ref 21–32)
CREAT SERPL-MCNC: 0.7 MG/DL (ref 0.6–1.2)
EOSINOPHILS ABSOLUTE: 0.1 K/UL (ref 0–0.6)
EOSINOPHILS RELATIVE PERCENT: 1 %
ESTIMATED AVERAGE GLUCOSE: 105.4 MG/DL
GFR AFRICAN AMERICAN: >60
GFR NON-AFRICAN AMERICAN: >60
GLUCOSE BLD-MCNC: 113 MG/DL (ref 70–99)
GLUCOSE BLD-MCNC: 129 MG/DL (ref 70–99)
HBA1C MFR BLD: 5.3 %
HCT VFR BLD CALC: 48.1 % (ref 36–48)
HEMOGLOBIN: 15.9 G/DL (ref 12–16)
INR BLD: 1.4 (ref 0.86–1.14)
LIPASE: 75 U/L (ref 13–60)
LYMPHOCYTES ABSOLUTE: 3.2 K/UL (ref 1–5.1)
LYMPHOCYTES RELATIVE PERCENT: 33.1 %
MCH RBC QN AUTO: 30 PG (ref 26–34)
MCHC RBC AUTO-ENTMCNC: 33.1 G/DL (ref 31–36)
MCV RBC AUTO: 90.6 FL (ref 80–100)
MONOCYTES ABSOLUTE: 1.4 K/UL (ref 0–1.3)
MONOCYTES RELATIVE PERCENT: 14.7 %
NEUTROPHILS ABSOLUTE: 4.8 K/UL (ref 1.7–7.7)
NEUTROPHILS RELATIVE PERCENT: 50.5 %
PDW BLD-RTO: 15.8 % (ref 12.4–15.4)
PERFORMED ON: ABNORMAL
PLATELET # BLD: 201 K/UL (ref 135–450)
PMV BLD AUTO: 10.8 FL (ref 5–10.5)
POTASSIUM SERPL-SCNC: 3.5 MMOL/L (ref 3.5–5.1)
PRO-BNP: 529 PG/ML (ref 0–449)
PROTHROMBIN TIME: 16.3 SEC (ref 10–13.2)
RBC # BLD: 5.31 M/UL (ref 4–5.2)
SODIUM BLD-SCNC: 140 MMOL/L (ref 136–145)
TOTAL PROTEIN: 7.5 G/DL (ref 6.4–8.2)
TROPONIN: <0.01 NG/ML
WBC # BLD: 9.5 K/UL (ref 4–11)

## 2020-06-16 PROCEDURE — 80048 BASIC METABOLIC PNL TOTAL CA: CPT

## 2020-06-16 PROCEDURE — 99213 OFFICE O/P EST LOW 20 MIN: CPT | Performed by: FAMILY MEDICINE

## 2020-06-16 PROCEDURE — 83880 ASSAY OF NATRIURETIC PEPTIDE: CPT

## 2020-06-16 PROCEDURE — 87086 URINE CULTURE/COLONY COUNT: CPT

## 2020-06-16 PROCEDURE — 6370000000 HC RX 637 (ALT 250 FOR IP): Performed by: INTERNAL MEDICINE

## 2020-06-16 PROCEDURE — 83690 ASSAY OF LIPASE: CPT

## 2020-06-16 PROCEDURE — 74176 CT ABD & PELVIS W/O CONTRAST: CPT

## 2020-06-16 PROCEDURE — 80076 HEPATIC FUNCTION PANEL: CPT

## 2020-06-16 PROCEDURE — 6360000002 HC RX W HCPCS: Performed by: INTERNAL MEDICINE

## 2020-06-16 PROCEDURE — 84484 ASSAY OF TROPONIN QUANT: CPT

## 2020-06-16 PROCEDURE — 85610 PROTHROMBIN TIME: CPT

## 2020-06-16 PROCEDURE — 85025 COMPLETE CBC W/AUTO DIFF WBC: CPT

## 2020-06-16 PROCEDURE — 85730 THROMBOPLASTIN TIME PARTIAL: CPT

## 2020-06-16 PROCEDURE — 2580000003 HC RX 258: Performed by: INTERNAL MEDICINE

## 2020-06-16 PROCEDURE — 1200000000 HC SEMI PRIVATE

## 2020-06-16 PROCEDURE — 93005 ELECTROCARDIOGRAM TRACING: CPT | Performed by: INTERNAL MEDICINE

## 2020-06-16 PROCEDURE — 94760 N-INVAS EAR/PLS OXIMETRY 1: CPT

## 2020-06-16 PROCEDURE — 81001 URINALYSIS AUTO W/SCOPE: CPT

## 2020-06-16 PROCEDURE — 2700000000 HC OXYGEN THERAPY PER DAY

## 2020-06-16 PROCEDURE — 99285 EMERGENCY DEPT VISIT HI MDM: CPT

## 2020-06-16 RX ORDER — ONDANSETRON 2 MG/ML
4 INJECTION INTRAMUSCULAR; INTRAVENOUS EVERY 6 HOURS PRN
Status: DISCONTINUED | OUTPATIENT
Start: 2020-06-16 | End: 2020-06-19 | Stop reason: HOSPADM

## 2020-06-16 RX ORDER — ALBUTEROL SULFATE 90 UG/1
2 AEROSOL, METERED RESPIRATORY (INHALATION) EVERY 6 HOURS PRN
Status: DISCONTINUED | OUTPATIENT
Start: 2020-06-16 | End: 2020-06-19 | Stop reason: HOSPADM

## 2020-06-16 RX ORDER — FUROSEMIDE 10 MG/ML
20 INJECTION INTRAMUSCULAR; INTRAVENOUS ONCE
Status: COMPLETED | OUTPATIENT
Start: 2020-06-16 | End: 2020-06-16

## 2020-06-16 RX ORDER — POLYETHYLENE GLYCOL 3350 17 G/17G
17 POWDER, FOR SOLUTION ORAL DAILY PRN
Status: DISCONTINUED | OUTPATIENT
Start: 2020-06-16 | End: 2020-06-19 | Stop reason: HOSPADM

## 2020-06-16 RX ORDER — INSULIN LISPRO 100 [IU]/ML
0-6 INJECTION, SOLUTION INTRAVENOUS; SUBCUTANEOUS
Status: DISCONTINUED | OUTPATIENT
Start: 2020-06-17 | End: 2020-06-19 | Stop reason: HOSPADM

## 2020-06-16 RX ORDER — ASCORBIC ACID 500 MG
500 TABLET ORAL DAILY
Status: DISCONTINUED | OUTPATIENT
Start: 2020-06-17 | End: 2020-06-19 | Stop reason: HOSPADM

## 2020-06-16 RX ORDER — ACETAMINOPHEN 650 MG/1
650 SUPPOSITORY RECTAL EVERY 6 HOURS PRN
Status: DISCONTINUED | OUTPATIENT
Start: 2020-06-16 | End: 2020-06-19 | Stop reason: HOSPADM

## 2020-06-16 RX ORDER — ROSUVASTATIN CALCIUM 10 MG/1
5 TABLET, COATED ORAL NIGHTLY
Status: DISCONTINUED | OUTPATIENT
Start: 2020-06-16 | End: 2020-06-19 | Stop reason: HOSPADM

## 2020-06-16 RX ORDER — PANTOPRAZOLE SODIUM 40 MG/1
40 TABLET, DELAYED RELEASE ORAL
Status: DISCONTINUED | OUTPATIENT
Start: 2020-06-17 | End: 2020-06-19 | Stop reason: HOSPADM

## 2020-06-16 RX ORDER — FLUTICASONE PROPIONATE 50 MCG
2 SPRAY, SUSPENSION (ML) NASAL DAILY
Status: DISCONTINUED | OUTPATIENT
Start: 2020-06-17 | End: 2020-06-19 | Stop reason: HOSPADM

## 2020-06-16 RX ORDER — CELECOXIB 100 MG/1
200 CAPSULE ORAL DAILY
Status: DISCONTINUED | OUTPATIENT
Start: 2020-06-17 | End: 2020-06-19 | Stop reason: HOSPADM

## 2020-06-16 RX ORDER — PROMETHAZINE HYDROCHLORIDE 25 MG/1
12.5 TABLET ORAL EVERY 6 HOURS PRN
Status: DISCONTINUED | OUTPATIENT
Start: 2020-06-16 | End: 2020-06-19 | Stop reason: HOSPADM

## 2020-06-16 RX ORDER — SIMVASTATIN 40 MG
20 TABLET ORAL NIGHTLY
Status: DISCONTINUED | OUTPATIENT
Start: 2020-06-16 | End: 2020-06-16 | Stop reason: CLARIF

## 2020-06-16 RX ORDER — ACETAMINOPHEN 325 MG/1
650 TABLET ORAL EVERY 6 HOURS PRN
Status: DISCONTINUED | OUTPATIENT
Start: 2020-06-16 | End: 2020-06-19 | Stop reason: HOSPADM

## 2020-06-16 RX ORDER — INSULIN LISPRO 100 [IU]/ML
0-3 INJECTION, SOLUTION INTRAVENOUS; SUBCUTANEOUS NIGHTLY
Status: DISCONTINUED | OUTPATIENT
Start: 2020-06-16 | End: 2020-06-19 | Stop reason: HOSPADM

## 2020-06-16 RX ORDER — SODIUM CHLORIDE 0.9 % (FLUSH) 0.9 %
10 SYRINGE (ML) INJECTION EVERY 12 HOURS SCHEDULED
Status: DISCONTINUED | OUTPATIENT
Start: 2020-06-16 | End: 2020-06-19 | Stop reason: HOSPADM

## 2020-06-16 RX ORDER — SODIUM CHLORIDE 0.9 % (FLUSH) 0.9 %
10 SYRINGE (ML) INJECTION PRN
Status: DISCONTINUED | OUTPATIENT
Start: 2020-06-16 | End: 2020-06-19 | Stop reason: HOSPADM

## 2020-06-16 RX ADMIN — Medication 10 ML: at 23:34

## 2020-06-16 RX ADMIN — ROSUVASTATIN CALCIUM 5 MG: 10 TABLET, FILM COATED ORAL at 23:33

## 2020-06-16 RX ADMIN — METOPROLOL TARTRATE 25 MG: 25 TABLET, FILM COATED ORAL at 23:33

## 2020-06-16 RX ADMIN — VERAPAMIL HYDROCHLORIDE 180 MG: 180 TABLET, FILM COATED, EXTENDED RELEASE ORAL at 23:33

## 2020-06-16 RX ADMIN — FUROSEMIDE 20 MG: 10 INJECTION, SOLUTION INTRAMUSCULAR; INTRAVENOUS at 23:33

## 2020-06-16 ASSESSMENT — ENCOUNTER SYMPTOMS
VOMITING: 0
RHINORRHEA: 0
DIARRHEA: 0
WHEEZING: 0
SHORTNESS OF BREATH: 1
NAUSEA: 0
COUGH: 0
ABDOMINAL PAIN: 1

## 2020-06-16 NOTE — PROGRESS NOTES
(CELEBREX) 200 MG capsule TAKE ONE CAPSULE BY MOUTH DAILY Yes Harry Medrano MD   lisinopril (PRINIVIL;ZESTRIL) 40 MG tablet TAKE ONE TABLET BY MOUTH DAILY Yes Harry Medrano MD   simvastatin (ZOCOR) 20 MG tablet TAKE ONE TABLET BY MOUTH ONCE NIGHTLY Yes Harry Medrano MD   verapamil (CALAN SR) 180 MG extended release tablet TAKE ONE TABLET BY MOUTH TWICE A DAY Yes Harry Medrano MD   metFORMIN (GLUCOPHAGE-XR) 500 MG extended release tablet TAKE ONE TABLET BY MOUTH DAILY WITH SUPPER Yes Harry Medrano MD   chlorthalidone (HYGROTON) 25 MG tablet TAKE ONE TABLET BY MOUTH DAILY Yes Harry Medrano MD   potassium chloride (KLOR-CON M) 20 MEQ extended release tablet TAKE ONE TABLET BY MOUTH DAILY Yes Harry Medrano MD   Bioflavonoid Products (BIOFLEX PO) Take by mouth Yes Historical Provider, MD   Cholecalciferol (VITAMIN D3) 2000 UNITS CAPS Take by mouth Yes Historical Provider, MD   Ascorbic Acid (VITAMIN C WITH AZRA HIPS) 500 MG tablet Take 500 mg by mouth daily. Yes Historical Provider, MD   Multiple Vitamins-Minerals (RA VISION-DEBBY PRESERVE PO) Take  by mouth. Yes Historical Provider, MD       Past Medical History:   Diagnosis Date    Allergic     Arthritis     Hyperlipidemia     Hypertension     Obesity     Pre-diabetes     S/P colonoscopy 1990       Social History     Tobacco Use    Smoking status: Never Smoker    Smokeless tobacco: Never Used   Substance Use Topics    Alcohol use: Yes     Comment: occ wine       Family History   Problem Relation Age of Onset    Heart Disease Mother        OBJECTIVE:  /62   Pulse 84 Comment: walking  Temp 98 °F (36.7 °C)   Ht 5' (1.524 m)   Wt 226 lb (102.5 kg)   LMP 05/20/1991   BMI 44.14 kg/m²      Oximetry at room air is 88%, drops to 84% with any exertion/walking  GEN: Short of breath at rest, weight loss noted  NECK:  Supple without adenopathy.   CV:  Regular rate and rhythm, S1 and S2 normal, no murmurs, clicks   PULM: Decreased breath sounds right base  ABD: Soft, discomfort to palpation in epigastric area, no definite mass appreciated  EXT: No rash or edema  NEURO: Alert oriented ×3, shortness of breath with exertion, uses walker    ASSESSMENT/PLAN:  1. SOB (shortness of breath)  To ER for eval  Dec BS right base     2. Elevated liver enzymes  Needs work up  Concerned about abdominal malignancy with elevated liver enzymes, abdominal discomfort and weight loss    3. Weight loss, unintentional  Needs work up     4.  Abdominal discomfort, epigastric  Work up /ER eval     Called ahead to [facility] emergency room, talk to a PA  Patient to go home and get some of her things, will likely be admitted, she will have 1 of her neighbors drive her to the emergency room

## 2020-06-16 NOTE — ED PROVIDER NOTES
[facility]        Pt Name: Deepa Melo  MRN: [id number]  [place] 1940  Date of evaluation: 6/16/2020  Provider: Raudel Short PA-C  PCP: Alan Novak MD    Evaluation by BRETT. My supervising physician was available for consultation. CHIEF COMPLAINT       Chief Complaint   Patient presents with    Shortness of Breath     c/o SOB since april. Was seen at PCP today and sent here. O2 sat 88% RA. Denies CP       HISTORY OF PRESENT ILLNESS   (Location, Timing/Onset, Context/Setting, Quality, Duration, Modifying Factors, Severity, Associated Signs and Symptoms)  Note limiting factors. Deepa Melo is a 79 y.o. female who presents for evaluation of multiple complaints. She was seen at her PCP today and sent here for further evaluation of abdominal pain, weight loss, transaminitis and hypoxia. Patient states that she has had epigastric abdominal discomfort after eating x1 month with 15 pound unintentional weight loss. She also reports increasing shortness of breath, worse with exertion. No significant cough congestion runny nose. No nausea vomiting diarrhea. No urinary complaints. She reports history of cholecystectomy and appendectomy. No other abdominal surgeries. No history of CHF, COPD or lung cancer. She denies any dizziness/lightheadedness, weakness, visual disturbances or syncope. She has no other complaints or concerns at this time. Nursing Notes were all reviewed and agreed with or any disagreements were addressed in the HPI. REVIEW OF SYSTEMS    (2-9 systems for level 4, 10 or more for level 5)     Review of Systems   Constitutional: Positive for unexpected weight change. Negative for appetite change, chills and fever. HENT: Negative for congestion and rhinorrhea. Respiratory: Positive for shortness of breath. Negative for cough and wheezing. Cardiovascular: Negative for chest pain. Gastrointestinal: Positive for abdominal pain. Negative for diarrhea, nausea and vomiting. Genitourinary: Negative for difficulty urinating, dysuria and hematuria. Musculoskeletal: Negative for neck pain and neck stiffness. Skin: Negative for rash. Neurological: Negative for headaches. Positives and Pertinent negatives as per HPI. Except as noted above in the ROS, all other systems were reviewed and negative. PAST MEDICAL HISTORY     Past Medical History:   Diagnosis Date    Allergic     Arthritis     Hyperlipidemia     Hypertension     Obesity     Pre-diabetes     S/P colonoscopy 1990         SURGICAL HISTORY     Past Surgical History:   Procedure Laterality Date    APPENDECTOMY      CHOLECYSTECTOMY      REFRACTIVE SURGERY Bilateral     TONSILLECTOMY           CURRENTMEDICATIONS       Previous Medications    ASCORBIC ACID (VITAMIN C WITH AZRA HIPS) 500 MG TABLET    Take 500 mg by mouth daily. BIOFLAVONOID PRODUCTS (BIOFLEX PO)    Take by mouth    CELECOXIB (CELEBREX) 200 MG CAPSULE    TAKE ONE CAPSULE BY MOUTH DAILY    CHLORTHALIDONE (HYGROTON) 25 MG TABLET    TAKE ONE TABLET BY MOUTH DAILY    CHOLECALCIFEROL (VITAMIN D3) 2000 UNITS CAPS    Take by mouth    FAMOTIDINE (PEPCID) 20 MG TABLET    Take 1 tablet by mouth 2 times daily    FLUTICASONE (FLONASE) 50 MCG/ACT NASAL SPRAY    2 sprays by Nasal route daily    LISINOPRIL (PRINIVIL;ZESTRIL) 40 MG TABLET    TAKE ONE TABLET BY MOUTH DAILY    METFORMIN (GLUCOPHAGE-XR) 500 MG EXTENDED RELEASE TABLET    TAKE ONE TABLET BY MOUTH DAILY WITH SUPPER    METOPROLOL TARTRATE (LOPRESSOR) 25 MG TABLET    TAKE ONE TABLET BY MOUTH TWICE A DAY    MULTIPLE VITAMINS-MINERALS (RA VISION-DEBBY PRESERVE PO)    Take  by mouth.     OMEGA-3 FATTY ACIDS (FISH OIL PO)    Take by mouth    PANTOPRAZOLE (PROTONIX) 20 MG TABLET    Take 1 tablet by mouth every morning (before breakfast)    POTASSIUM CHLORIDE (KLOR-CON M) 20 MEQ EXTENDED RELEASE TABLET    TAKE (*)     All other components within normal limits    Narrative:     Performed at:  [facility]  [address] [facility],  [place], [address]   Phone [phone]   LIPASE - Abnormal; Notable for the following components:    Lipase 75.0 (*)     All other components within normal limits    Narrative:     Performed at:  [facility]  [address] [facility],  [place], [address]   Phone [phone]   TROPONIN    Narrative:     Performed at:  [facility]  [address] [facility],  [place], [address]   Phone [phone]   URINE RT REFLEX TO CULTURE       All other labs were within normal range or not returned as of this dictation. EKG: All EKG's are interpreted by the Emergency Department Physician in the absence of a cardiologist.  Please see their note for interpretation of EKG. RADIOLOGY:   Non-plain film images such as CT, Ultrasound and MRI are read by the radiologist. Plain radiographic images are visualized and preliminarily interpreted by the ED Provider with the below findings:        Interpretation per the Radiologist below, if available at the time of this note:    CT CHEST [address]   Final Result   Intrathoracic findings worrisome for malignancy including soft tissue   opacification of right lower lobe bronchi, bulky right paratracheal,   subcarinal and hilar adenopathy, as well as pleural nodularity. There is   complete opacification/consolidation of the right lower lobe without air   bronchograms. Underlying mass is possible, difficult to evaluate without   contrast.      Small to moderate right pleural effusion. Morphology liver compatible with chronic disease. Multiple hypodense   ill-defined bilobar hepatic lesions are compatible with malignancy, likely   metastatic disease. Small amount of abdominopelvic ascites, mesenteric edema. No results found.         PROCEDURES   Unless respiratory failure with hypoxia new oxygen requirement. Oncology will be consulted. Hospitals will resume care the patient at this time. Patient was informed and is agreeable. She is stable for admission. Critical Care  There was a high probability of life-threatening clinical deterioration in the patient's condition requiring my urgent intervention. Total critical care time with the patient was 35 minutes excluding separately reportable procedures. Critical care required due to patients hypoxia with new oxygen requirement and concern for decompensation. FINAL IMPRESSION      1. Acute respiratory failure with hypoxia ([address]    2. Transaminitis    3. Malignant ascites    4. Lung nodule          DISPOSITION/PLAN   DISPOSITION        PATIENT REFERREDTO:  Alverto Boudreaux MD  [address] [place] [address]  [phone]            DISCHARGE MEDICATIONS:  New Prescriptions    No medications on file       DISCONTINUED MEDICATIONS:  Discontinued Medications    No medications on file              (Please note that portions of this note were completed with a voice recognition program.  Efforts were made to edit the dictations but occasionally words are mis-transcribed.)    John Vallejo PA-C (electronically signed)           Ross Hebert PA-C  06/16/20 0643

## 2020-06-16 NOTE — H&P
Hospital Medicine History & Physical      PCP: Argenis Huston MD    Date of Admission: 6/16/2020    Date of Service: Pt seen/examined on 6/16/2020 and Admitted to Inpatient with expected LOS greater than two midnights due to medical therapy. Chief Complaint:  abd pain  And SOB       History Of Present Illness:     79 y.o. female with a history of hypertension, hyperlipidemia, prediabetes, obesity started having shortness of breath a month ago. Since then it has been gradually progressive. She lost around 15 pounds in 1 month. She is also experiencing vague intermittent abdominal pain for last few weeks. No change in her bowel movements. No recent URI or sick contacts or flulike symptoms. Went to see her primary care doctor today and was sent here for further evaluation. Past Medical History:          Diagnosis Date    Allergic     Arthritis     Hyperlipidemia     Hypertension     Obesity     Pre-diabetes     S/P colonoscopy 1990       Past Surgical History:          Procedure Laterality Date    APPENDECTOMY      CHOLECYSTECTOMY      REFRACTIVE SURGERY Bilateral     TONSILLECTOMY         Medications Prior to Admission:      Prior to Admission medications    Medication Sig Start Date End Date Taking?  Authorizing Provider   pantoprazole (PROTONIX) 20 MG tablet Take 1 tablet by mouth every morning (before breakfast) 6/12/20   Argenis Huston MD   famotidine (PEPCID) 20 MG tablet Take 1 tablet by mouth 2 times daily 4/30/20   Argenis Huston MD   fluticasone (FLONASE) 50 MCG/ACT nasal spray 2 sprays by Nasal route daily 4/28/20   Argenis Huston MD   Omega-3 Fatty Acids (FISH OIL PO) Take by mouth    Historical Provider, MD   metoprolol tartrate (LOPRESSOR) 25 MG tablet TAKE ONE TABLET BY MOUTH TWICE A DAY 12/18/19   Argenis Huston MD   celecoxib (CELEBREX) 200 MG capsule TAKE ONE CAPSULE BY MOUTH DAILY 12/18/19   Argenis Huston MD   lisinopril (PRINIVIL;ZESTRIL) 40 MG tablet TAKE ONE

## 2020-06-16 NOTE — ED NOTES
Pt a&o x4. Skin WNL for pts ethnicity. Pt c/o SOB SINCE April 1 2020, WITH INCREASED SOB IN LAST WEEK WAS AT PCP AND WAS AT 88% RA, 80S WHEN TRIAGED IN ED. EKG done upon arrival, & signed by ER MD. Pt rates pain 0/10. IV established by THIS RN without complications, blood work obtained and sent to lab. Pt tolerated well. Medicated per orders, see MAR. Pt family at bedside. Pt placed on appropriate monitors and cycling. SR with a HR of 90S. ST segment alarm enabled. Pt SITTING supine in bed in low position, call light in reach, all monitors in place, side rails up x2. Pt denies further needs at this time. Pt showing no signs of distress at this time. Breathing easy and unlabored. Will continue to monitor. Siobhan Harper RN  06/16/20 [id number]      Pt placed on 3.5L O2 via NC for HYPOXIA AND COMFORT.        Siobhan Harper RN  06/16/20 [id number]

## 2020-06-16 NOTE — ED NOTES
Pt returned from CT at this time. Pt denies needs at this time. A&O with no signs of distress. Pt laying supine in bed in low position, call light in reach, side rails up x2, and monitors in place. Will continue to monitor.           Nunu Pressley RN  06/16/20 [id number]

## 2020-06-17 PROBLEM — E87.6 HYPOKALEMIA: Status: ACTIVE | Noted: 2020-06-17

## 2020-06-17 PROBLEM — R82.81 PYURIA: Status: ACTIVE | Noted: 2020-06-17

## 2020-06-17 PROBLEM — R59.0 HILAR ADENOPATHY: Status: ACTIVE | Noted: 2020-06-17

## 2020-06-17 PROBLEM — K76.9 HEPATIC LESION: Status: ACTIVE | Noted: 2020-06-17

## 2020-06-17 PROBLEM — R29.818 SUSPECTED SLEEP APNEA: Status: ACTIVE | Noted: 2020-06-17

## 2020-06-17 PROBLEM — R59.0 MEDIASTINAL ADENOPATHY: Status: ACTIVE | Noted: 2020-06-17

## 2020-06-17 PROBLEM — R91.8 PULMONARY INFILTRATE: Status: ACTIVE | Noted: 2020-06-17

## 2020-06-17 PROBLEM — J90 PLEURAL EFFUSION ON RIGHT: Status: ACTIVE | Noted: 2020-06-17

## 2020-06-17 LAB
A/G RATIO: 1 (ref 1.1–2.2)
ALBUMIN SERPL-MCNC: 2.9 G/DL (ref 3.4–5)
ALP BLD-CCNC: 243 U/L (ref 40–129)
ALT SERPL-CCNC: 43 U/L (ref 10–40)
ANION GAP SERPL CALCULATED.3IONS-SCNC: 12 MMOL/L (ref 3–16)
AST SERPL-CCNC: 118 U/L (ref 15–37)
BILIRUB SERPL-MCNC: 1.6 MG/DL (ref 0–1)
BILIRUBIN URINE: ABNORMAL
BLOOD, URINE: NEGATIVE
BUN BLDV-MCNC: 15 MG/DL (ref 7–20)
CALCIUM SERPL-MCNC: 9.1 MG/DL (ref 8.3–10.6)
CELLULAR CASTS: ABNORMAL /LPF
CHLORIDE BLD-SCNC: 98 MMOL/L (ref 99–110)
CLARITY: ABNORMAL
CO2: 30 MMOL/L (ref 21–32)
COLOR: ABNORMAL
CREAT SERPL-MCNC: 0.6 MG/DL (ref 0.6–1.2)
EKG ATRIAL RATE: 91 BPM
EKG DIAGNOSIS: NORMAL
EKG P AXIS: 26 DEGREES
EKG P-R INTERVAL: 174 MS
EKG Q-T INTERVAL: 396 MS
EKG QRS DURATION: 64 MS
EKG QTC CALCULATION (BAZETT): 487 MS
EKG R AXIS: 4 DEGREES
EKG T AXIS: 37 DEGREES
EKG VENTRICULAR RATE: 91 BPM
EPITHELIAL CELLS, UA: 5 /HPF (ref 0–5)
GFR AFRICAN AMERICAN: >60
GFR NON-AFRICAN AMERICAN: >60
GLOBULIN: 2.9 G/DL
GLUCOSE BLD-MCNC: 80 MG/DL (ref 70–99)
GLUCOSE BLD-MCNC: 88 MG/DL (ref 70–99)
GLUCOSE BLD-MCNC: 92 MG/DL (ref 70–99)
GLUCOSE BLD-MCNC: 97 MG/DL (ref 70–99)
GLUCOSE BLD-MCNC: 98 MG/DL (ref 70–99)
GLUCOSE URINE: NEGATIVE MG/DL
HCT VFR BLD CALC: 41 % (ref 36–48)
HEMOGLOBIN: 13.6 G/DL (ref 12–16)
HYALINE CASTS: ABNORMAL /LPF (ref 0–2)
KETONES, URINE: 40 MG/DL
LEUKOCYTE ESTERASE, URINE: ABNORMAL
MAGNESIUM: 1.4 MG/DL (ref 1.8–2.4)
MAGNESIUM: 1.8 MG/DL (ref 1.8–2.4)
MCH RBC QN AUTO: 29.9 PG (ref 26–34)
MCHC RBC AUTO-ENTMCNC: 33.1 G/DL (ref 31–36)
MCV RBC AUTO: 90.3 FL (ref 80–100)
MICROSCOPIC EXAMINATION: YES
NITRITE, URINE: NEGATIVE
PDW BLD-RTO: 15.6 % (ref 12.4–15.4)
PERFORMED ON: NORMAL
PH UA: 6 (ref 5–8)
PLATELET # BLD: 152 K/UL (ref 135–450)
PMV BLD AUTO: 10.7 FL (ref 5–10.5)
POTASSIUM REFLEX MAGNESIUM: 3 MMOL/L (ref 3.5–5.1)
POTASSIUM SERPL-SCNC: 4 MMOL/L (ref 3.5–5.1)
PROTEIN UA: 30 MG/DL
RBC # BLD: 4.54 M/UL (ref 4–5.2)
RBC UA: 7 /HPF (ref 0–4)
SODIUM BLD-SCNC: 140 MMOL/L (ref 136–145)
SPECIFIC GRAVITY UA: 1.02 (ref 1–1.03)
TOTAL PROTEIN: 5.8 G/DL (ref 6.4–8.2)
URINE CULTURE, ROUTINE: NORMAL
URINE REFLEX TO CULTURE: YES
URINE TYPE: ABNORMAL
UROBILINOGEN, URINE: 2 E.U./DL
WBC # BLD: 7.6 K/UL (ref 4–11)
WBC UA: 11 /HPF (ref 0–5)

## 2020-06-17 PROCEDURE — 84132 ASSAY OF SERUM POTASSIUM: CPT

## 2020-06-17 PROCEDURE — 93010 ELECTROCARDIOGRAM REPORT: CPT | Performed by: INTERNAL MEDICINE

## 2020-06-17 PROCEDURE — 94760 N-INVAS EAR/PLS OXIMETRY 1: CPT

## 2020-06-17 PROCEDURE — 36415 COLL VENOUS BLD VENIPUNCTURE: CPT

## 2020-06-17 PROCEDURE — 6370000000 HC RX 637 (ALT 250 FOR IP): Performed by: INTERNAL MEDICINE

## 2020-06-17 PROCEDURE — 2580000003 HC RX 258: Performed by: INTERNAL MEDICINE

## 2020-06-17 PROCEDURE — 6360000002 HC RX W HCPCS: Performed by: INTERNAL MEDICINE

## 2020-06-17 PROCEDURE — 85027 COMPLETE CBC AUTOMATED: CPT

## 2020-06-17 PROCEDURE — 99223 1ST HOSP IP/OBS HIGH 75: CPT | Performed by: INTERNAL MEDICINE

## 2020-06-17 PROCEDURE — 83735 ASSAY OF MAGNESIUM: CPT

## 2020-06-17 PROCEDURE — 1200000000 HC SEMI PRIVATE

## 2020-06-17 PROCEDURE — 80053 COMPREHEN METABOLIC PANEL: CPT

## 2020-06-17 PROCEDURE — 2700000000 HC OXYGEN THERAPY PER DAY

## 2020-06-17 RX ORDER — POTASSIUM CHLORIDE 7.45 MG/ML
10 INJECTION INTRAVENOUS
Status: COMPLETED | OUTPATIENT
Start: 2020-06-17 | End: 2020-06-17

## 2020-06-17 RX ORDER — LEVOFLOXACIN 500 MG/1
500 TABLET, FILM COATED ORAL DAILY
Status: DISCONTINUED | OUTPATIENT
Start: 2020-06-17 | End: 2020-06-19 | Stop reason: HOSPADM

## 2020-06-17 RX ORDER — MAGNESIUM SULFATE IN WATER 40 MG/ML
2 INJECTION, SOLUTION INTRAVENOUS ONCE
Status: COMPLETED | OUTPATIENT
Start: 2020-06-17 | End: 2020-06-17

## 2020-06-17 RX ORDER — POTASSIUM CHLORIDE 7.45 MG/ML
10 INJECTION INTRAVENOUS PRN
Status: DISCONTINUED | OUTPATIENT
Start: 2020-06-17 | End: 2020-06-19 | Stop reason: HOSPADM

## 2020-06-17 RX ADMIN — Medication 10 MEQ: at 15:32

## 2020-06-17 RX ADMIN — ROSUVASTATIN CALCIUM 5 MG: 10 TABLET, FILM COATED ORAL at 21:17

## 2020-06-17 RX ADMIN — Medication 10 MEQ: at 12:44

## 2020-06-17 RX ADMIN — Medication 10 MEQ: at 14:18

## 2020-06-17 RX ADMIN — PANTOPRAZOLE SODIUM 40 MG: 40 TABLET, DELAYED RELEASE ORAL at 06:22

## 2020-06-17 RX ADMIN — METOPROLOL TARTRATE 25 MG: 25 TABLET, FILM COATED ORAL at 21:17

## 2020-06-17 RX ADMIN — VERAPAMIL HYDROCHLORIDE 180 MG: 180 TABLET, FILM COATED, EXTENDED RELEASE ORAL at 09:33

## 2020-06-17 RX ADMIN — Medication 10 MEQ: at 11:45

## 2020-06-17 RX ADMIN — Medication 10 ML: at 21:25

## 2020-06-17 RX ADMIN — METOPROLOL TARTRATE 25 MG: 25 TABLET, FILM COATED ORAL at 09:33

## 2020-06-17 RX ADMIN — MAGNESIUM SULFATE HEPTAHYDRATE 2 G: 40 INJECTION, SOLUTION INTRAVENOUS at 09:34

## 2020-06-17 RX ADMIN — CELECOXIB 200 MG: 100 CAPSULE ORAL at 09:33

## 2020-06-17 RX ADMIN — VERAPAMIL HYDROCHLORIDE 180 MG: 180 TABLET, FILM COATED, EXTENDED RELEASE ORAL at 21:17

## 2020-06-17 RX ADMIN — LEVOFLOXACIN 500 MG: 500 TABLET, FILM COATED ORAL at 14:20

## 2020-06-17 RX ADMIN — Medication 10 MEQ: at 18:07

## 2020-06-17 RX ADMIN — Medication 10 MEQ: at 16:52

## 2020-06-17 RX ADMIN — Medication 10 ML: at 09:34

## 2020-06-17 RX ADMIN — OXYCODONE HYDROCHLORIDE AND ACETAMINOPHEN 500 MG: 500 TABLET ORAL at 09:33

## 2020-06-17 ASSESSMENT — PAIN SCALES - GENERAL
PAINLEVEL_OUTOF10: 0

## 2020-06-17 NOTE — PROGRESS NOTES
4 Eyes Skin Assessment     The patient is being assess for  Admission    I agree that 2 RN's have performed a thorough Head to Toe Skin Assessment on the patient. ALL assessment sites listed below have been assessed. Areas assessed by both nurses:   [x]   Head, Face, and Ears   [x]   Shoulders, Back, and Chest  [x]   Arms, Elbows, and Hands   [x]   Coccyx, Sacrum, and IschIum  [x]   Legs, Feet, and Heels        Does the Patient have Skin Breakdown?   No         Rajendra Prevention initiated:  Yes   Wound Care Orders initiated:  No      [facility] nurse consulted for Pressure Injury (Stage 3,4, Unstageable, DTI, NWPT, and Complex wounds), New and Established Ostomies:  No      Nurse 1 eSignature: Electronically signed by Dilma Maher RN on 6/16/20 at 11:50 PM EDT    **SHARE this note so that the co-signing nurse is able to place an eSignature**    Nurse 2 eSignature: Electronically signed by Lee Holder RN on 6/17/20 at 12:03 AM EDT

## 2020-06-17 NOTE — PROGRESS NOTES
less breath sounds on right. Cardiovascular: Regular rate and rhythm with normal S1/S2 without murmurs, rubs or gallops. Abdomen: Soft, non-tender, non-distended with normal bowel sounds. Musculoskeletal: No clubbing, cyanosis or edema bilaterally. Full range of motion without deformity. Skin: Skin color, texture, turgor normal.  No rashes or lesions. Neurologic:  Neurovascularly intact without any focal sensory/motor deficits. Cranial nerves: II-XII intact, grossly non-focal.  Psychiatric: Sleeping, arousable. Capillary Refill: Brisk,< 3 seconds   Peripheral Pulses: +2 palpable, equal bilaterally       Labs:   Recent Labs     06/15/20  1206 06/16/20  1707 06/17/20  0424   WBC 7.8 9.5 7.6   HGB 15.9 15.9 13.6   HCT 48.7* 48.1* 41.0    201 152     Recent Labs     06/15/20  1206 06/16/20  1707 06/17/20  0424    140 140   K 3.7 3.5 3.0*   CL 96* 97* 98*   CO2 26 26 30   BUN 13 16 15   CREATININE <0.5* 0.7 0.6   CALCIUM 9.4 9.7 9.1     Recent Labs     06/15/20  1206 06/16/20  1707 06/17/20  0424   * 145* 118*   ALT 52* 58* 43*   BILIDIR  --  0.9*  --    BILITOT 1.8* 2.0* 1.6*   ALKPHOS 319* 322* 243*     Recent Labs     06/16/20  1708   INR 1.40*     Recent Labs     06/16/20 1707   TROPONINI <0.01       Urinalysis:      Lab Results   Component Value Date    NITRU Negative 06/16/2020    WBCUA 11 06/16/2020    RBCUA 7 06/16/2020    BLOODU Negative 06/16/2020    SPECGRAV 1.021 06/16/2020    GLUCOSEU Negative 06/16/2020       Radiology:  CT CHEST ABDOMEN PELVIS WO CONTRAST   Final Result   Intrathoracic findings worrisome for malignancy including soft tissue   opacification of right lower lobe bronchi, bulky right paratracheal,   subcarinal and hilar adenopathy, as well as pleural nodularity. There is   complete opacification/consolidation of the right lower lobe without air   bronchograms.   Underlying mass is possible, difficult to evaluate without   contrast.      Small to moderate right

## 2020-06-17 NOTE — CONSULTS
25 mg  25 mg Oral BID Paulo Bernal MD   25 mg at 06/17/20 0933    pantoprazole (PROTONIX) tablet 40 mg  40 mg Oral QAM AC Paulo Bernal MD   40 mg at 06/17/20 0622    verapamil (CALAN SR) extended release tablet 180 mg  180 mg Oral BID Paulo Bernal MD   180 mg at 06/17/20 0933    sodium chloride flush 0.9 % injection 10 mL  10 mL Intravenous 2 times per day Paulo Bernal MD   10 mL at 06/17/20 0934    sodium chloride flush 0.9 % injection 10 mL  10 mL Intravenous PRN Paulo Bernal MD        acetaminophen (TYLENOL) tablet 650 mg  650 mg Oral Q6H PRN aPulo Bernal MD        Or   Stephanie Mackintosh acetaminophen (TYLENOL) suppository 650 mg  650 mg Rectal Q6H PRN Paulo Bernal MD        polyethylene glycol (GLYCOLAX) packet 17 g  17 g Oral Daily PRN Paulo Bernal MD        promethazine (PHENERGAN) tablet 12.5 mg  12.5 mg Oral Q6H PRN Paulo Bernal MD        Or    ondansetron (ZOFRAN) injection 4 mg  4 mg Intravenous Q6H PRN Paulo Bernal MD        enoxaparin (LOVENOX) injection 40 mg  40 mg Subcutaneous Nightly Paulo Bernal MD        insulin lispro (1 Unit Dial) 0-6 Units  0-6 Units Subcutaneous TID WC Paulo Bernal MD        insulin lispro (1 Unit Dial) 0-3 Units  0-3 Units Subcutaneous Nightly Paulo Bernal MD        albuterol sulfate  (90 Base) MCG/ACT inhaler 2 puff  2 puff Inhalation Q6H PRN Paulo Bernal MD        rosuvastatin (CRESTOR) tablet 5 mg  5 mg Oral Nightly Paulo Bernal MD   5 mg at 06/16/20 2333     Allergies: Allergies   Allergen Reactions    Penicillins     Tetracyclines & Related Nausea Only      Social History:    reports that she has never smoked. She has never used smokeless tobacco. She reports current alcohol use. She reports that she does not use drugs. Family History:     family history includes Heart Disease in her mother.      ·   ·     PHYSICAL EXAM:    Vitals:  Vitals:    06/17/20 1645 CONTRAST 6/16/2020 6:18 pm TECHNIQUE: CT of the chest, abdomen and pelvis was performed without the administration of intravenous contrast. Multiplanar reformatted images are provided for review. Dose modulation, iterative reconstruction, and/or weight based adjustment of the mA/kV was utilized to reduce the radiation dose to as low as reasonably achievable. COMPARISON: None available HISTORY: ORDERING SYSTEM PROVIDED HISTORY: abd pain, hypoxia, elevated lfts TECHNOLOGIST PROVIDED HISTORY: Reason for exam:->abd pain, hypoxia, elevated lfts Additional Contrast?->None Reason for Exam: abd pain, hypoxia, elevated lfts Acuity: Acute Type of Exam: Initial History of hypertension, hyperlipidemia, cholecystectomy, appendectomy. Transaminitis, hypoxia, weight loss, abdominal pain. FINDINGS: Chest: Mediastinum: There is trace pericardial fluid. Coronary arterial and aortic calcifications are identified. The central pulmonary arteries and thoracic aorta are within normal limits in caliber and course. Bulky right paratracheal and subcarinal adenopathy is identified. Right paratracheal lymphadenopathy measures 2.9 x 2.5 cm. Subcarinal adenopathy measures 5.4 x 2.7 cm. There is also right hilar adenopathy which is contiguous with consolidation of the entire right lower lobe without air bronchograms. Lungs/pleura: There is complete occlusion of the right lower lobe bronchi and their branches as well as near-complete opacification of right middle lobe bronchi with soft tissue attenuation. There is complete consolidation of the right lower lobe and partial consolidation of the right middle lobe. The left lung is clear with exception minimal left basilar atelectasis or scarring. Multiple nodules of the pleura the right chest are compatible with pleural metastases. There is a small to moderate right pleural effusion, tracking into the lower right major fissure. Soft Tissues/Bones: No acute or focal suspicious bony abnormality.

## 2020-06-17 NOTE — CONSULTS
INPATIENT PULMONARY CRITICAL CARE CONSULT NOTE      Chief Complaint/Referring Provider:  Patient is being seen at the request of Dr. Kimberly Steele  for a consultation for Lung mass     Presenting HPI: Patient admitted to the hospital with progressive shortness of breath    As per ER Norma Andino is a 79 y.o. female who presents for evaluation of multiple complaints. She was seen at her PCP today and sent here for further evaluation of abdominal pain, weight loss, transaminitis and hypoxia. Patient states that she has had epigastric abdominal discomfort after eating x1 month with 15 pound unintentional weight loss. She also reports increasing shortness of breath, worse with exertion. No significant cough congestion runny nose. No nausea vomiting diarrhea. No urinary complaints. She reports history of cholecystectomy and appendectomy. No other abdominal surgeries. No history of CHF, COPD or lung cancer. She denies any dizziness/lightheadedness, weakness, visual disturbances or syncope. She has no other complaints or concerns at this time.     Patient when seen this morning states that she has been having increasing shortness of breath for last few months time which started in April of this year, patient states prior to the shortness of breath she had some rhinorrhea nasal congestion and patient states that it was thought it is because of allergies she is having some shortness of breath but patient symptoms were progressive in nature along with the shortness of breath patient states that she has cough and mucoid expectoration without any purulence or any hemoptysis, patient also states that she had a reflux symptoms which caused her to have some abdominal discomfort and constipation, patient states that she did not have any significant blurring of vision or any headaches, patient did not have any sore throat or difficulty in swallowing, no coughing or choking while eating, patient does not have any dysphagia or odynophagia, patient did not have any chest pain per se but had chest tightness, patient was having some wheezing, patient on questioning further states that she did not have any significant fever or chills, patient states that she has been having no hematochezia or melena, no dysuria, patient has increasing leg swelling and patient states that she got some Lasix last night and there is some improvement in the leg swelling itself, patient was not exposed to anything different in terms of any chemicals or fumes or any change in the ambient environment, no sick contacts, patient has never been a smoker, patient states that she does have history sister with sleep fragmentation but has not been evaluated for any sleep apnea, patient when seen was alert and oriented, no other pertinent review of system of concern       Patient Active Problem List    Diagnosis Date Noted    Mediastinal adenopathy 06/17/2020    Hilar adenopathy 06/17/2020    Pulmonary infiltrate 06/17/2020    Pleural effusion on right 06/17/2020    Hepatic lesion 06/17/2020    Pyuria 06/17/2020    Hypokalemia 06/17/2020    Suspected sleep apnea 06/17/2020    Elevated liver enzymes 06/16/2020    SOB (shortness of breath) 06/16/2020    Lung mass 06/16/2020    Morbid obesity with BMI of 40.0-44.9, adult ([facility] [address] 05/09/2018    Acquired varus deformity knee 07/14/2016    Primary osteoarthritis of left knee 07/14/2016    Chondromalacia patellae of left knee 07/14/2016    Chronic pain of left knee 07/14/2016    Primary osteoarthritis of right knee 07/14/2016    Chondromalacia patellae of right knee 07/14/2016    Chronic pain of right knee 07/14/2016    Hyperlipidemia 05/25/2016    Primary osteoarthritis of knee 05/25/2016    Prediabetes 01/01/2015    OA (osteoarthritis) of knee 07/11/2012    HTN (hypertension) 07/11/2012       Past Medical History:   Diagnosis Date    Allergic     Arthritis     Hyperlipidemia     Hypertension  Obesity     Pre-diabetes     S/P colonoscopy 1990        Past Surgical History:   Procedure Laterality Date    APPENDECTOMY      CHOLECYSTECTOMY      REFRACTIVE SURGERY Bilateral     TONSILLECTOMY          Family History   Problem Relation Age of Onset    Heart Disease Mother         Social History     Tobacco Use    Smoking status: Never Smoker    Smokeless tobacco: Never Used   Substance Use Topics    Alcohol use: Yes     Comment: occ wine        Allergies   Allergen Reactions    Penicillins     Tetracyclines & Related Nausea Only               Physical Exam:  Blood pressure (!) 99/57, pulse 69, temperature 98.2 °F (36.8 °C), temperature source Oral, resp. rate 18, height 5' (1.524 m), weight 223 lb 8 oz (101.4 kg), last menstrual period 05/20/1991, SpO2 91 %, not currently breastfeeding.'   Constitutional:  No acute distress. HENT:  Oropharynx is clear and moist. No thyromegaly. Eyes:  Conjunctivae are normal. Pupils equal, round, and reactive to light. No scleral icterus. Neck: . No tracheal deviation present. No obvious thyroid mass. Short enlarged neck  Cardiovascular: Normal rate, regular rhythm, normal heart sounds. No right ventricular heave. No lower extremity edema. Pulmonary/Chest: No wheezes. No rales. Chest wall is not dull to percussion. No accessory muscle usage or stridor. Prolonged expiration with decreased breath sound density  Abdominal: Soft. Bowel sounds present. No distension or hernia. No tenderness. Obese  Musculoskeletal: No cyanosis. No clubbing. No obvious joint deformity. Lymphadenopathy: No cervical or supraclavicular adenopathy. Skin: Skin is warm and dry. No rash or nodules on the exposed extremities. Psychiatric: Normal mood and affect. Behavior is normal.  No anxiety. Neurologic: Alert, awake and oriented. PERRL.   Speech fluent        Results:  CBC:   Recent Labs     06/15/20  1206 06/16/20  1707 06/17/20  0424   WBC 7.8 9.5 7.6   HGB 15.9 15.9 13.6   HCT 48.7* 48.1* 41.0   MCV 91.9 90.6 90.3    201 152     BMP:   Recent Labs     06/15/20  1206 06/16/20  1707 06/17/20  0424    140 140   K 3.7 3.5 3.0*   CL 96* 97* 98*   CO2 26 26 30   BUN 13 16 15   CREATININE <0.5* 0.7 0.6     LIVER PROFILE:   Recent Labs     06/15/20  1206 06/16/20  1707 06/17/20  0424   * 145* 118*   ALT 52* 58* 43*   LIPASE  --  75.0*  --    BILIDIR  --  0.9*  --    BILITOT 1.8* 2.0* 1.6*   ALKPHOS 319* 322* 243*     PT/INR:   Recent Labs     06/16/20 1708   PROTIME 16.3*   INR 1.40*     APTT:   Recent Labs     06/16/20 1708   APTT 37.2*     UA:  Recent Labs     06/16/20  2342   COLORU TANIYA*   PHUR 6.0   WBCUA 11*   RBCUA 7*   CLARITYU CLOUDY*   SPECGRAV 1.021   LEUKOCYTESUR SMALL*   UROBILINOGEN 2.0*   BILIRUBINUR MODERATE*   BLOODU Negative   GLUCOSEU Negative       Imaging:  I have reviewed radiology images personally. CT CHEST ABDOMEN PELVIS WO CONTRAST   Final Result   Intrathoracic findings worrisome for malignancy including soft tissue   opacification of right lower lobe bronchi, bulky right paratracheal,   subcarinal and hilar adenopathy, as well as pleural nodularity. There is   complete opacification/consolidation of the right lower lobe without air   bronchograms. Underlying mass is possible, difficult to evaluate without   contrast.      Small to moderate right pleural effusion. Morphology liver compatible with chronic disease. Multiple hypodense   ill-defined bilobar hepatic lesions are compatible with malignancy, likely   metastatic disease. Small amount of abdominopelvic ascites, mesenteric edema. Ct Chest Abdomen Pelvis Wo Contrast    Result Date: 6/16/2020  EXAMINATION: CT OF THE CHEST, ABDOMEN, AND PELVIS WITHOUT CONTRAST 6/16/2020 6:18 pm TECHNIQUE: CT of the chest, abdomen and pelvis was performed without the administration of intravenous contrast. Multiplanar reformatted images are provided for review.  Dose segment. There are multiple bilobar ill-defined hypodense hepatic lesions most compatible with metastatic disease. Lesions measure up to 2.5 cm in the right hepatic lobe. The spleen shows no noncontrast abnormality and is normal in size. The adrenal glands, pancreas and kidneys show no acute noncontrast abnormality. There is a punctate midpole nonobstructing right renal pelvic stone. The gallbladder is not visualized. GI/Bowel: There is fluid within portions of the intrathoracic esophagus. The bowel shows normal caliber and course without suspected wall thickening. There are descending and sigmoid colonic diverticula without evidence of active inflammation. The appendix is not visualized. Pelvis: The uterus and urinary bladder show no acute noncontrast abnormality. Peritoneum/Retroperitoneum: The aorta is normal in caliber and course, showing mild calcifications. Bones/Soft Tissues: No acute or focal suspicious bony abnormality. Small amount of perihepatic and pelvic ascites. Mild scattered mesenteric edema. No pathologically enlarged abdominopelvic lymph nodes. Intrathoracic findings worrisome for malignancy including soft tissue opacification of right lower lobe bronchi, bulky right paratracheal, subcarinal and hilar adenopathy, as well as pleural nodularity. There is complete opacification/consolidation of the right lower lobe without air bronchograms. Underlying mass is possible, difficult to evaluate without contrast. Small to moderate right pleural effusion. Morphology liver compatible with chronic disease. Multiple hypodense ill-defined bilobar hepatic lesions are compatible with malignancy, likely metastatic disease. Small amount of abdominopelvic ascites, mesenteric edema. Results for Aj Samayoa (MRN [id number]) as of 6/17/2020 13:14   Ref.  Range 6/16/2020 23:42   Color, UA Latest Ref Range: Straw/Yellow  TANIYA (A)   Clarity, UA Latest Ref Range: Clear  CLOUDY (A)   Glucose, UA Latest Ref Range: Negative mg/dL Negative   Bilirubin, Urine Latest Ref Range: Negative  MODERATE (A)   Ketones, Urine Latest Ref Range: Negative mg/dL 40 (A)   Specific [place], UA Latest Ref Range: 1.005 - 1.030  1.021   Blood, Urine Latest Ref Range: Negative  Negative   pH, UA Latest Ref Range: 5.0 - 8.0  6.0   Protein, UA Latest Ref Range: Negative mg/dL 30 (A)   Urobilinogen, Urine Latest Ref Range: <2.0 E.U./dL 2.0 (A)   Nitrite, Urine Latest Ref Range: Negative  Negative   Leukocyte Esterase, Urine Latest Ref Range: Negative  SMALL (A)   Urine Type Unknown NotGiven   Urine Reflex to Culture Unknown Yes   Cellular Cast, UA Latest Ref Range: None Seen /LPF 1-3 WBC (A)   Hyaline Casts, UA Latest Ref Range: 0 - 2 /LPF 0-2   WBC, UA Latest Ref Range: 0 - 5 /HPF 11 (H)   RBC, UA Latest Ref Range: 0 - 4 /HPF 7 (H)   Epithelial Cells, UA Latest Ref Range: 0 - 5 /HPF 5   Microscopic Examination Unknown YES   URINE RT REFLEX TO CULTURE Unknown Rpt (A)         Echocardiogram: None in Epic  PFT:None in Epic         Assessment:  Active Problems: Morbid obesity with BMI of 40.0-44.9, adult (HCC)    SOB (shortness of breath)    Lung mass    Mediastinal adenopathy    Hilar adenopathy    Pulmonary infiltrate    Pleural effusion on right    Hepatic lesion    Pyuria    Hypokalemia    Suspected sleep apnea  Resolved Problems:    * No resolved hospital problems.  *          Plan:   · Oxygen supplementation to keep saturation between 90 to 94% only  · Pulmonary toilet  · Patient was shown the CT scan of the chest along with findings, differential diagnosis and implications  · Another differential diagnosis includes malignancy  · On the base of patient's CT chest there is a possibility that patient may also have an endobronchial lesion  · Patient needs a histopathological diagnosis  · Patient was told that she will benefit from bronchoscopy with endobronchial ultrasound and needle biopsy along with the BAL/brushing and biopsy of any endobronchial mass if need be and patient was told about the procedure along with the pros and cons and patient was agreeable-we will arrange that for tomorrow morning as it could not be done today secondary logistics  · Patient's case was discussed with Dr. Alyce Mcnulty (Oncology) and will hold off on any liver biopsy at this time  · Patient has elevated LFTs which can be secondary to the liver lesions but it also can be because of patient being on statin-internal medicine team will decide upon continuation of statin for now  · Empiric p.o.  Levaquin started for pyuria-to be decided upon discontinuation in the morning depending on patient's clinical status and cultures  · Patient got 1 dose of Lasix last night with improvement in the leg edema  · Intervention team can decide upon if patient needs any echocardiogram or not  · Potassium being aggressively replaced  · Keep negative fluid balance  · Monitor input output and BMP  · Correct electrolytes on whenever necessary basis  · Patient may benefit from polysomnography as an outpatient if need be and patient's PCP can decide upon that after discussing with the patient  · Monitor for any hypoglycemia as patient will be n.p.o. after midnight  · Peptic ulcer disease and DVT prophylaxis as per IM      Further management depending on patient's clinical status and the bronchoscopy findings    Case d/w patient and nursing           Electronically signed by:  Stuart Esparza MD    6/17/2020    1:18 PM.

## 2020-06-18 ENCOUNTER — ANESTHESIA (OUTPATIENT)
Dept: ENDOSCOPY | Age: 80
DRG: 181 | End: 2020-06-18
Payer: MEDICARE

## 2020-06-18 ENCOUNTER — ANESTHESIA EVENT (OUTPATIENT)
Dept: ENDOSCOPY | Age: 80
DRG: 181 | End: 2020-06-18
Payer: MEDICARE

## 2020-06-18 VITALS — TEMPERATURE: 96.8 F | DIASTOLIC BLOOD PRESSURE: 43 MMHG | SYSTOLIC BLOOD PRESSURE: 98 MMHG | OXYGEN SATURATION: 95 %

## 2020-06-18 LAB
A/G RATIO: 0.8 (ref 1.1–2.2)
ALBUMIN SERPL-MCNC: 2.7 G/DL (ref 3.4–5)
ALP BLD-CCNC: 250 U/L (ref 40–129)
ALT SERPL-CCNC: 43 U/L (ref 10–40)
ANION GAP SERPL CALCULATED.3IONS-SCNC: 11 MMOL/L (ref 3–16)
APPEARANCE BAL (LAVAGE): ABNORMAL
AST SERPL-CCNC: 114 U/L (ref 15–37)
BASOPHILS ABSOLUTE: 0 K/UL (ref 0–0.2)
BASOPHILS RELATIVE PERCENT: 0.6 %
BILIRUB SERPL-MCNC: 1.6 MG/DL (ref 0–1)
BUN BLDV-MCNC: 17 MG/DL (ref 7–20)
CALCIUM SERPL-MCNC: 8.6 MG/DL (ref 8.3–10.6)
CHLORIDE BLD-SCNC: 98 MMOL/L (ref 99–110)
CLOT EVALUATION BAL: ABNORMAL
CO2: 28 MMOL/L (ref 21–32)
COLOR LAVAGE: COLORLESS
CREAT SERPL-MCNC: 0.5 MG/DL (ref 0.6–1.2)
EOSIN: 9 %
EOSINOPHILS ABSOLUTE: 0.1 K/UL (ref 0–0.6)
EOSINOPHILS RELATIVE PERCENT: 1.5 %
EPITHELIAL CELLS FLUID: 12 %
GFR AFRICAN AMERICAN: >60
GFR NON-AFRICAN AMERICAN: >60
GLOBULIN: 3.6 G/DL
GLUCOSE BLD-MCNC: 106 MG/DL (ref 70–99)
GLUCOSE BLD-MCNC: 145 MG/DL (ref 70–99)
GLUCOSE BLD-MCNC: 77 MG/DL (ref 70–99)
GLUCOSE BLD-MCNC: 88 MG/DL (ref 70–99)
HCT VFR BLD CALC: 41.4 % (ref 36–48)
HEMOGLOBIN: 14 G/DL (ref 12–16)
LYMPHOCYTES ABSOLUTE: 2.2 K/UL (ref 1–5.1)
LYMPHOCYTES RELATIVE PERCENT: 29.2 %
LYMPHOCYTES, BAL: 13 % (ref 5–10)
MACROPHAGES, BAL: 7 % (ref 90–95)
MAGNESIUM: 1.8 MG/DL (ref 1.8–2.4)
MCH RBC QN AUTO: 30.7 PG (ref 26–34)
MCHC RBC AUTO-ENTMCNC: 33.8 G/DL (ref 31–36)
MCV RBC AUTO: 90.7 FL (ref 80–100)
MONOCYTES ABSOLUTE: 1.2 K/UL (ref 0–1.3)
MONOCYTES RELATIVE PERCENT: 15.8 %
MONOCYTES, BAL: 14 %
MONONUCLEAR UNIDENTIFIED CELLS FLUID BAL: 1 %
NEUTROPHILS ABSOLUTE: 4 K/UL (ref 1.7–7.7)
NEUTROPHILS RELATIVE PERCENT: 52.9 %
NUMBER OF CELLS COUNTED BAL (LAVAGE): 100
PATH REVIEW BAL (LAVAGE): YES
PDW BLD-RTO: 15.8 % (ref 12.4–15.4)
PERFORMED ON: ABNORMAL
PERFORMED ON: ABNORMAL
PERFORMED ON: NORMAL
PLATELET # BLD: 142 K/UL (ref 135–450)
PMV BLD AUTO: 9.8 FL (ref 5–10.5)
POTASSIUM SERPL-SCNC: 3.9 MMOL/L (ref 3.5–5.1)
RBC # BLD: 4.57 M/UL (ref 4–5.2)
RBC, BAL: 3 /CUMM
SEGMENTED NEUTROPHILS, BAL: 44 % (ref 5–10)
SODIUM BLD-SCNC: 137 MMOL/L (ref 136–145)
TOTAL PROTEIN: 6.3 G/DL (ref 6.4–8.2)
WBC # BLD: 7.5 K/UL (ref 4–11)
WBC/EPI CELLS BAL: 418 /CUMM

## 2020-06-18 PROCEDURE — 6370000000 HC RX 637 (ALT 250 FOR IP): Performed by: INTERNAL MEDICINE

## 2020-06-18 PROCEDURE — 3700000001 HC ADD 15 MINUTES (ANESTHESIA): Performed by: INTERNAL MEDICINE

## 2020-06-18 PROCEDURE — 1200000000 HC SEMI PRIVATE

## 2020-06-18 PROCEDURE — 3603165200 HC BRNCHSC EBUS GUIDED SAMPL 1/2 NODE STATION/STRUX: Performed by: INTERNAL MEDICINE

## 2020-06-18 PROCEDURE — 85025 COMPLETE CBC W/AUTO DIFF WBC: CPT

## 2020-06-18 PROCEDURE — 80053 COMPREHEN METABOLIC PANEL: CPT

## 2020-06-18 PROCEDURE — 2700000000 HC OXYGEN THERAPY PER DAY

## 2020-06-18 PROCEDURE — 88104 CYTOPATH FL NONGYN SMEARS: CPT

## 2020-06-18 PROCEDURE — 88360 TUMOR IMMUNOHISTOCHEM/MANUAL: CPT

## 2020-06-18 PROCEDURE — 88184 FLOWCYTOMETRY/ TC 1 MARKER: CPT

## 2020-06-18 PROCEDURE — 83735 ASSAY OF MAGNESIUM: CPT

## 2020-06-18 PROCEDURE — 88312 SPECIAL STAINS GROUP 1: CPT

## 2020-06-18 PROCEDURE — 87070 CULTURE OTHR SPECIMN AEROBIC: CPT

## 2020-06-18 PROCEDURE — 88341 IMHCHEM/IMCYTCHM EA ADD ANTB: CPT

## 2020-06-18 PROCEDURE — 2580000003 HC RX 258: Performed by: ANESTHESIOLOGY

## 2020-06-18 PROCEDURE — 3609011300 HC BRONCHOSCOPY BRONCHIAL/ENDOBRNCL BX 1+ SITES: Performed by: INTERNAL MEDICINE

## 2020-06-18 PROCEDURE — 3609010800 HC BRONCHOSCOPY ALVEOLAR LAVAGE: Performed by: INTERNAL MEDICINE

## 2020-06-18 PROCEDURE — 88185 FLOWCYTOMETRY/TC ADD-ON: CPT

## 2020-06-18 PROCEDURE — 88112 CYTOPATH CELL ENHANCE TECH: CPT

## 2020-06-18 PROCEDURE — 2500000003 HC RX 250 WO HCPCS: Performed by: ANESTHESIOLOGY

## 2020-06-18 PROCEDURE — 88305 TISSUE EXAM BY PATHOLOGIST: CPT

## 2020-06-18 PROCEDURE — 2500000003 HC RX 250 WO HCPCS: Performed by: NURSE ANESTHETIST, CERTIFIED REGISTERED

## 2020-06-18 PROCEDURE — 2580000003 HC RX 258: Performed by: INTERNAL MEDICINE

## 2020-06-18 PROCEDURE — 31652 BRONCH EBUS SAMPLNG 1/2 NODE: CPT | Performed by: INTERNAL MEDICINE

## 2020-06-18 PROCEDURE — 87015 SPECIMEN INFECT AGNT CONCNTJ: CPT

## 2020-06-18 PROCEDURE — 7100000001 HC PACU RECOVERY - ADDTL 15 MIN: Performed by: INTERNAL MEDICINE

## 2020-06-18 PROCEDURE — 94640 AIRWAY INHALATION TREATMENT: CPT

## 2020-06-18 PROCEDURE — 3609011100 HC BRONCHOSCOPY BRUSHINGS: Performed by: INTERNAL MEDICINE

## 2020-06-18 PROCEDURE — 0B9D8ZX DRAINAGE OF RIGHT MIDDLE LUNG LOBE, VIA NATURAL OR ARTIFICIAL OPENING ENDOSCOPIC, DIAGNOSTIC: ICD-10-PCS | Performed by: INTERNAL MEDICINE

## 2020-06-18 PROCEDURE — 31624 DX BRONCHOSCOPE/LAVAGE: CPT | Performed by: INTERNAL MEDICINE

## 2020-06-18 PROCEDURE — 6360000002 HC RX W HCPCS: Performed by: INTERNAL MEDICINE

## 2020-06-18 PROCEDURE — 87206 SMEAR FLUORESCENT/ACID STAI: CPT

## 2020-06-18 PROCEDURE — 2709999900 HC NON-CHARGEABLE SUPPLY: Performed by: INTERNAL MEDICINE

## 2020-06-18 PROCEDURE — 3609020000 HC BRONCHOSCOPY W/EBUS FNA: Performed by: INTERNAL MEDICINE

## 2020-06-18 PROCEDURE — 6370000000 HC RX 637 (ALT 250 FOR IP): Performed by: ANESTHESIOLOGY

## 2020-06-18 PROCEDURE — 0BD58ZX EXTRACTION OF RIGHT MIDDLE LOBE BRONCHUS, VIA NATURAL OR ARTIFICIAL OPENING ENDOSCOPIC, DIAGNOSTIC: ICD-10-PCS | Performed by: INTERNAL MEDICINE

## 2020-06-18 PROCEDURE — 99232 SBSQ HOSP IP/OBS MODERATE 35: CPT | Performed by: INTERNAL MEDICINE

## 2020-06-18 PROCEDURE — 6360000002 HC RX W HCPCS: Performed by: NURSE ANESTHETIST, CERTIFIED REGISTERED

## 2020-06-18 PROCEDURE — 87116 MYCOBACTERIA CULTURE: CPT

## 2020-06-18 PROCEDURE — 3700000000 HC ANESTHESIA ATTENDED CARE: Performed by: INTERNAL MEDICINE

## 2020-06-18 PROCEDURE — 31625 BRONCHOSCOPY W/BIOPSY(S): CPT | Performed by: INTERNAL MEDICINE

## 2020-06-18 PROCEDURE — 87205 SMEAR GRAM STAIN: CPT

## 2020-06-18 PROCEDURE — 7100000000 HC PACU RECOVERY - FIRST 15 MIN: Performed by: INTERNAL MEDICINE

## 2020-06-18 PROCEDURE — 2720000010 HC SURG SUPPLY STERILE: Performed by: INTERNAL MEDICINE

## 2020-06-18 PROCEDURE — C1725 CATH, TRANSLUMIN NON-LASER: HCPCS | Performed by: INTERNAL MEDICINE

## 2020-06-18 PROCEDURE — 88342 IMHCHEM/IMCYTCHM 1ST ANTB: CPT

## 2020-06-18 PROCEDURE — 94761 N-INVAS EAR/PLS OXIMETRY MLT: CPT

## 2020-06-18 PROCEDURE — 89051 BODY FLUID CELL COUNT: CPT

## 2020-06-18 PROCEDURE — 31623 DX BRONCHOSCOPE/BRUSH: CPT | Performed by: INTERNAL MEDICINE

## 2020-06-18 PROCEDURE — 87102 FUNGUS ISOLATION CULTURE: CPT

## 2020-06-18 PROCEDURE — 36415 COLL VENOUS BLD VENIPUNCTURE: CPT

## 2020-06-18 RX ORDER — DEXTROSE MONOHYDRATE 25 G/50ML
12.5 INJECTION, SOLUTION INTRAVENOUS ONCE
Status: COMPLETED | OUTPATIENT
Start: 2020-06-18 | End: 2020-06-18

## 2020-06-18 RX ORDER — IPRATROPIUM BROMIDE AND ALBUTEROL SULFATE 2.5; .5 MG/3ML; MG/3ML
1 SOLUTION RESPIRATORY (INHALATION)
Status: DISCONTINUED | OUTPATIENT
Start: 2020-06-18 | End: 2020-06-19 | Stop reason: HOSPADM

## 2020-06-18 RX ORDER — FENTANYL CITRATE 50 UG/ML
INJECTION, SOLUTION INTRAMUSCULAR; INTRAVENOUS PRN
Status: DISCONTINUED | OUTPATIENT
Start: 2020-06-18 | End: 2020-06-18 | Stop reason: SDUPTHER

## 2020-06-18 RX ORDER — PROPOFOL 10 MG/ML
INJECTION, EMULSION INTRAVENOUS PRN
Status: DISCONTINUED | OUTPATIENT
Start: 2020-06-18 | End: 2020-06-18 | Stop reason: SDUPTHER

## 2020-06-18 RX ORDER — LIDOCAINE HYDROCHLORIDE 20 MG/ML
INJECTION, SOLUTION EPIDURAL; INFILTRATION; INTRACAUDAL; PERINEURAL PRN
Status: DISCONTINUED | OUTPATIENT
Start: 2020-06-18 | End: 2020-06-18 | Stop reason: SDUPTHER

## 2020-06-18 RX ORDER — SODIUM CHLORIDE 9 MG/ML
INJECTION, SOLUTION INTRAVENOUS CONTINUOUS
Status: DISCONTINUED | OUTPATIENT
Start: 2020-06-18 | End: 2020-06-19 | Stop reason: HOSPADM

## 2020-06-18 RX ORDER — LIDOCAINE HYDROCHLORIDE 40 MG/ML
4 INJECTION, SOLUTION RETROBULBAR; TOPICAL ONCE
Status: COMPLETED | OUTPATIENT
Start: 2020-06-18 | End: 2020-06-18

## 2020-06-18 RX ORDER — LIDOCAINE HYDROCHLORIDE 40 MG/ML
SOLUTION TOPICAL PRN
Status: DISCONTINUED | OUTPATIENT
Start: 2020-06-18 | End: 2020-06-18 | Stop reason: HOSPADM

## 2020-06-18 RX ORDER — EPHEDRINE SULFATE/0.9% NACL/PF 50 MG/5 ML
SYRINGE (ML) INTRAVENOUS PRN
Status: DISCONTINUED | OUTPATIENT
Start: 2020-06-18 | End: 2020-06-18 | Stop reason: SDUPTHER

## 2020-06-18 RX ADMIN — FENTANYL CITRATE 50 MCG: 50 INJECTION, SOLUTION INTRAMUSCULAR; INTRAVENOUS at 13:15

## 2020-06-18 RX ADMIN — VERAPAMIL HYDROCHLORIDE 180 MG: 180 TABLET, FILM COATED, EXTENDED RELEASE ORAL at 20:14

## 2020-06-18 RX ADMIN — FENTANYL CITRATE 50 MCG: 50 INJECTION, SOLUTION INTRAMUSCULAR; INTRAVENOUS at 13:23

## 2020-06-18 RX ADMIN — DEXTROSE MONOHYDRATE 12.5 G: 500 INJECTION PARENTERAL at 11:25

## 2020-06-18 RX ADMIN — Medication 10 ML: at 08:36

## 2020-06-18 RX ADMIN — Medication 10 MG: at 13:44

## 2020-06-18 RX ADMIN — PHENYLEPHRINE HYDROCHLORIDE 100 MCG: 10 INJECTION INTRAVENOUS at 13:48

## 2020-06-18 RX ADMIN — IPRATROPIUM BROMIDE AND ALBUTEROL SULFATE 1 AMPULE: .5; 3 SOLUTION RESPIRATORY (INHALATION) at 11:34

## 2020-06-18 RX ADMIN — IPRATROPIUM BROMIDE AND ALBUTEROL SULFATE 1 AMPULE: .5; 3 SOLUTION RESPIRATORY (INHALATION) at 21:07

## 2020-06-18 RX ADMIN — SODIUM CHLORIDE: 9 INJECTION, SOLUTION INTRAVENOUS at 20:15

## 2020-06-18 RX ADMIN — IPRATROPIUM BROMIDE AND ALBUTEROL SULFATE 1 AMPULE: .5; 3 SOLUTION RESPIRATORY (INHALATION) at 16:42

## 2020-06-18 RX ADMIN — Medication 10 MG: at 14:02

## 2020-06-18 RX ADMIN — PHENYLEPHRINE HYDROCHLORIDE 200 MCG: 10 INJECTION INTRAVENOUS at 13:44

## 2020-06-18 RX ADMIN — ENOXAPARIN SODIUM 40 MG: 40 INJECTION SUBCUTANEOUS at 20:14

## 2020-06-18 RX ADMIN — LEVOFLOXACIN 500 MG: 500 TABLET, FILM COATED ORAL at 16:09

## 2020-06-18 RX ADMIN — LIDOCAINE HYDROCHLORIDE 4 ML: 40 INJECTION, SOLUTION RETROBULBAR; TOPICAL at 11:34

## 2020-06-18 RX ADMIN — PROPOFOL 40 MG: 10 INJECTION, EMULSION INTRAVENOUS at 13:41

## 2020-06-18 RX ADMIN — Medication 10 ML: at 20:15

## 2020-06-18 RX ADMIN — ROSUVASTATIN CALCIUM 5 MG: 10 TABLET, FILM COATED ORAL at 20:15

## 2020-06-18 RX ADMIN — METOPROLOL TARTRATE 25 MG: 25 TABLET, FILM COATED ORAL at 20:14

## 2020-06-18 RX ADMIN — PROPOFOL 50 MG: 10 INJECTION, EMULSION INTRAVENOUS at 13:28

## 2020-06-18 RX ADMIN — LIDOCAINE HYDROCHLORIDE 100 MG: 20 INJECTION, SOLUTION EPIDURAL; INFILTRATION; INTRACAUDAL; PERINEURAL at 13:14

## 2020-06-18 RX ADMIN — PROPOFOL 20 MG: 10 INJECTION, EMULSION INTRAVENOUS at 13:53

## 2020-06-18 RX ADMIN — PROPOFOL 175 MG: 10 INJECTION, EMULSION INTRAVENOUS at 13:14

## 2020-06-18 RX ADMIN — SODIUM CHLORIDE: 9 INJECTION, SOLUTION INTRAVENOUS at 11:03

## 2020-06-18 RX ADMIN — PROPOFOL 20 MG: 10 INJECTION, EMULSION INTRAVENOUS at 14:04

## 2020-06-18 RX ADMIN — PHENYLEPHRINE HYDROCHLORIDE 100 MCG: 10 INJECTION INTRAVENOUS at 14:01

## 2020-06-18 ASSESSMENT — PULMONARY FUNCTION TESTS
PIF_VALUE: 7
PIF_VALUE: 19
PIF_VALUE: 20
PIF_VALUE: 20
PIF_VALUE: 7
PIF_VALUE: 7
PIF_VALUE: 22
PIF_VALUE: 19
PIF_VALUE: 5
PIF_VALUE: 19
PIF_VALUE: 17
PIF_VALUE: 7
PIF_VALUE: 9
PIF_VALUE: 14
PIF_VALUE: 4
PIF_VALUE: 4
PIF_VALUE: 6
PIF_VALUE: 7
PIF_VALUE: 5
PIF_VALUE: 11
PIF_VALUE: 8
PIF_VALUE: 10
PIF_VALUE: 22
PIF_VALUE: 7
PIF_VALUE: 6
PIF_VALUE: 7
PIF_VALUE: 23
PIF_VALUE: 8
PIF_VALUE: 3
PIF_VALUE: 7
PIF_VALUE: 2
PIF_VALUE: 1
PIF_VALUE: 6
PIF_VALUE: 5
PIF_VALUE: 14
PIF_VALUE: 20
PIF_VALUE: 16
PIF_VALUE: 16
PIF_VALUE: 1
PIF_VALUE: 6
PIF_VALUE: 17
PIF_VALUE: 6
PIF_VALUE: 2
PIF_VALUE: 7
PIF_VALUE: 5
PIF_VALUE: 20
PIF_VALUE: 19
PIF_VALUE: 4
PIF_VALUE: 6
PIF_VALUE: 1
PIF_VALUE: 1
PIF_VALUE: 12
PIF_VALUE: 6
PIF_VALUE: 4
PIF_VALUE: 13
PIF_VALUE: 16
PIF_VALUE: 8
PIF_VALUE: 23
PIF_VALUE: 6
PIF_VALUE: 5
PIF_VALUE: 22
PIF_VALUE: 6
PIF_VALUE: 10
PIF_VALUE: 10
PIF_VALUE: 6
PIF_VALUE: 5
PIF_VALUE: 19
PIF_VALUE: 16
PIF_VALUE: 7
PIF_VALUE: 24
PIF_VALUE: 7
PIF_VALUE: 16
PIF_VALUE: 2
PIF_VALUE: 7
PIF_VALUE: 8
PIF_VALUE: 5
PIF_VALUE: 21
PIF_VALUE: 7
PIF_VALUE: 3
PIF_VALUE: 6
PIF_VALUE: 6
PIF_VALUE: 19

## 2020-06-18 ASSESSMENT — PAIN SCALES - GENERAL
PAINLEVEL_OUTOF10: 0
PAINLEVEL_OUTOF10: 2
PAINLEVEL_OUTOF10: 0

## 2020-06-18 ASSESSMENT — PAIN DESCRIPTION - LOCATION: LOCATION: BACK

## 2020-06-18 ASSESSMENT — PAIN - FUNCTIONAL ASSESSMENT: PAIN_FUNCTIONAL_ASSESSMENT: 0-10

## 2020-06-18 ASSESSMENT — ENCOUNTER SYMPTOMS: SHORTNESS OF BREATH: 1

## 2020-06-18 ASSESSMENT — PAIN DESCRIPTION - PAIN TYPE: TYPE: ACUTE PAIN

## 2020-06-18 NOTE — PROGRESS NOTES
INPATIENT PULMONARY CRITICAL CARE PROGRESS NOTE      Reason for visit    Lung mass    SUBJECTIVE: Patient when seen this morning was not having any increased cough or shortness of breath, patient was not have any chest pain, patient was afebrile and hemodynamically maintained patient was on 5 L of nasal cannula oxygen with saturation 95%, patient had normal sinus rhythm on the monitor, his glycemic control was acceptable, no other new pertinent review of system of concern          Physical Exam:  Blood pressure (!) 119/54, pulse 89, temperature 97.9 °F (36.6 °C), temperature source Temporal, resp. rate 16, height 5' (1.524 m), weight 223 lb 8 oz (101.4 kg), last menstrual period 05/20/1991, SpO2 98 %, not currently breastfeeding.'     Constitutional:  No acute distress. HENT:  Oropharynx is clear and moist. No thyromegaly. Eyes:  Conjunctivae are normal. Pupils equal, round, and reactive to light. No scleral icterus. Neck: . No tracheal deviation present. No obvious thyroid mass. Short enlarged neck  Cardiovascular: Normal rate, regular rhythm, normal heart sounds. No right ventricular heave. No lower extremity edema. Pulmonary/Chest: No wheezes. No rales. Chest wall is not dull to percussion. No accessory muscle usage or stridor. Prolonged expiration with decreased breath sound density  Abdominal: Soft. Bowel sounds present. No distension or hernia. No tenderness. Obese  Musculoskeletal: No cyanosis. No clubbing. No obvious joint deformity. Lymphadenopathy: No cervical or supraclavicular adenopathy. Skin: Skin is warm and dry. No rash or nodules on the exposed extremities. Psychiatric: Normal mood and affect. Behavior is normal.  slight  anxiety. Neurologic: Alert, awake and oriented. PERRL.   Speech fluent      Results:  CBC:   Recent Labs     06/16/20  1707 06/17/20  0424 06/18/20  0421   WBC 9.5 7.6 7.5   HGB 15.9 13.6 14.0   HCT 48.1* 41.0 41.4   MCV 90.6 90.3 90.7    152 142     BMP: and/or weight based adjustment of the mA/kV was utilized to reduce the radiation dose to as low as reasonably achievable. COMPARISON: None available HISTORY: ORDERING SYSTEM PROVIDED HISTORY: abd pain, hypoxia, elevated lfts TECHNOLOGIST PROVIDED HISTORY: Reason for exam:->abd pain, hypoxia, elevated lfts Additional Contrast?->None Reason for Exam: abd pain, hypoxia, elevated lfts Acuity: Acute Type of Exam: Initial History of hypertension, hyperlipidemia, cholecystectomy, appendectomy. Transaminitis, hypoxia, weight loss, abdominal pain. FINDINGS: Chest: Mediastinum: There is trace pericardial fluid. Coronary arterial and aortic calcifications are identified. The central pulmonary arteries and thoracic aorta are within normal limits in caliber and course. Bulky right paratracheal and subcarinal adenopathy is identified. Right paratracheal lymphadenopathy measures 2.9 x 2.5 cm. Subcarinal adenopathy measures 5.4 x 2.7 cm. There is also right hilar adenopathy which is contiguous with consolidation of the entire right lower lobe without air bronchograms. Lungs/pleura: There is complete occlusion of the right lower lobe bronchi and their branches as well as near-complete opacification of right middle lobe bronchi with soft tissue attenuation. There is complete consolidation of the right lower lobe and partial consolidation of the right middle lobe. The left lung is clear with exception minimal left basilar atelectasis or scarring. Multiple nodules of the pleura the right chest are compatible with pleural metastases. There is a small to moderate right pleural effusion, tracking into the lower right major fissure. Soft Tissues/Bones: No acute or focal suspicious bony abnormality. Abdomen/Pelvis: Organs: The morphology of the liver is compatible with chronic disease with nodular contour as well as fissural widening, medial segmental atrophy and prominence of left lateral segment.   There are multiple bilobar ill-defined hypodense hepatic lesions most compatible with metastatic disease. Lesions measure up to 2.5 cm in the right hepatic lobe. The spleen shows no noncontrast abnormality and is normal in size. The adrenal glands, pancreas and kidneys show no acute noncontrast abnormality. There is a punctate midpole nonobstructing right renal pelvic stone. The gallbladder is not visualized. GI/Bowel: There is fluid within portions of the intrathoracic esophagus. The bowel shows normal caliber and course without suspected wall thickening. There are descending and sigmoid colonic diverticula without evidence of active inflammation. The appendix is not visualized. Pelvis: The uterus and urinary bladder show no acute noncontrast abnormality. Peritoneum/Retroperitoneum: The aorta is normal in caliber and course, showing mild calcifications. Bones/Soft Tissues: No acute or focal suspicious bony abnormality. Small amount of perihepatic and pelvic ascites. Mild scattered mesenteric edema. No pathologically enlarged abdominopelvic lymph nodes. Intrathoracic findings worrisome for malignancy including soft tissue opacification of right lower lobe bronchi, bulky right paratracheal, subcarinal and hilar adenopathy, as well as pleural nodularity. There is complete opacification/consolidation of the right lower lobe without air bronchograms. Underlying mass is possible, difficult to evaluate without contrast. Small to moderate right pleural effusion. Morphology liver compatible with chronic disease. Multiple hypodense ill-defined bilobar hepatic lesions are compatible with malignancy, likely metastatic disease. Small amount of abdominopelvic ascites, mesenteric edema. Results for Naomie Mendez (MRN [id number]) as of 6/18/2020 12:39   Ref.  Range 6/17/2020 04:24 6/17/2020 07:51 6/17/2020 11:30 6/17/2020 17:18 6/17/2020 20:36 6/17/2020 21:15 6/18/2020 04:21   Sodium Latest Ref Range: 136 - 145 mmol/L 140      137 Potassium Latest Ref Range: 3.5 - 5.1 mmol/L 3.0 (L)    4.0  3.9   Chloride Latest Ref Range: 99 - 110 mmol/L 98 (L)      98 (L)   CO2 Latest Ref Range: 21 - 32 mmol/L 30      28   BUN Latest Ref Range: 7 - 20 mg/dL 15      17   Creatinine Latest Ref Range: 0.6 - 1.2 mg/dL 0.6      0.5 (L)   Anion Gap Latest Ref Range: 3 - 16  12      11   GFR Non- Latest Ref Range: >60  >60      >60   GFR  Latest Ref Range: >60  >60      >60   Magnesium Latest Ref Range: 1.80 - 2.40 mg/dL 1.40 (L)    1.80  1.80   Glucose Latest Ref Range: 70 - 99 mg/dL 88      88   POC Glucose Latest Ref Range: 70 - 99 mg/dl  80 97 98  92    Calcium Latest Ref Range: 8.3 - 10.6 mg/dL 9.1      8.6   Total Protein Latest Ref Range: 6.4 - 8.2 g/dL 5.8 (L)      6.3 (L)     Results for Naomie Mendez (MRN [id number]) as of 6/18/2020 12:39   Ref. Range 12/18/2019 11:15 6/15/2020 12:06 6/16/2020 17:07 6/17/2020 04:24 6/18/2020 04:21   WBC Latest Ref Range: 4.0 - 11.0 K/uL 9.9 7.8 9.5 7.6 7.5   RBC Latest Ref Range: 4.00 - 5.20 M/uL 5.18 5.31 (H) 5.31 (H) 4.54 4.57   Hemoglobin Quant Latest Ref Range: 12.0 - 16.0 g/dL 15.8 15.9 15.9 13.6 14.0   Hematocrit Latest Ref Range: 36.0 - 48.0 % 48.0 48.7 (H) 48.1 (H) 41.0 41.4   MCV Latest Ref Range: 80.0 - 100.0 fL 92.7 91.9 90.6 90.3 90.7   MCH Latest Ref Range: 26.0 - 34.0 pg 30.6 30.0 30.0 29.9 30.7   MCHC Latest Ref Range: 31.0 - 36.0 g/dL 33.0 32.7 33.1 33.1 33.8   MPV Latest Ref Range: 5.0 - 10.5 fL 10.7 (H) 11.6 (H) 10.8 (H) 10.7 (H) 9.8   RDW Latest Ref Range: 12.4 - 15.4 % 14.5 16.1 (H) 15.8 (H) 15.6 (H) 15.8 (H)   Platelet Count Latest Ref Range: 135 - 450 K/uL 191 187 201 152 142       Assessment:  Active Problems:     Morbid obesity with BMI of 40.0-44.9, adult (HCC)    SOB (shortness of breath)    Lung mass    Mediastinal adenopathy    Hilar adenopathy    Pulmonary infiltrate    Pleural effusion on right    Hepatic lesion    Pyuria    Hypokalemia    Suspected sleep

## 2020-06-18 NOTE — ANESTHESIA PRE PROCEDURE
ipratropium-albuterol (DUONEB) nebulizer solution 1 ampule  1 ampule Inhalation Q4H SHELLEY Urbina MD   1 ampule at 06/18/20 1134    magnesium replacement protocol   Other RX Placeholder Collin Landin MD        potassium chloride 10 mEq/100 mL IVPB (Peripheral Line)  10 mEq Intravenous PRN Collin Landin MD        levoFLOXacin (LEVAQUIN) tablet 500 mg  500 mg Oral Daily Angela Chand MD   Stopped at 06/18/20 0844    vitamin C (ASCORBIC ACID) tablet 500 mg  500 mg Oral Daily Lawson Rivero MD   Stopped at 06/18/20 0844    celecoxib (CELEBREX) capsule 200 mg  200 mg Oral Daily Lawson Rivero MD   Stopped at 06/18/20 0844    fluticasone (FLONASE) 50 MCG/ACT nasal spray 2 spray  2 spray Nasal Daily Lawson Rivero MD        metoprolol tartrate (LOPRESSOR) tablet 25 mg  25 mg Oral BID Lawson Rivero MD   Stopped at 06/18/20 0844    pantoprazole (PROTONIX) tablet 40 mg  40 mg Oral QAM AC Lawson Rivero MD   Stopped at 06/18/20 0844    verapamil (CALAN SR) extended release tablet 180 mg  180 mg Oral BID Lawson Rivero MD   Stopped at 06/18/20 0844    sodium chloride flush 0.9 % injection 10 mL  10 mL Intravenous 2 times per day Lawson Rivero MD   10 mL at 06/18/20 0836    sodium chloride flush 0.9 % injection 10 mL  10 mL Intravenous PRN Lawson Rivero MD        acetaminophen (TYLENOL) tablet 650 mg  650 mg Oral Q6H PRN Lawson Rivero MD        Or   Phelan acetaminophen (TYLENOL) suppository 650 mg  650 mg Rectal Q6H PRN Lawson Rivero MD        polyethylene glycol (GLYCOLAX) packet 17 g  17 g Oral Daily PRN Lawson Rivero MD        promethazine (PHENERGAN) tablet 12.5 mg  12.5 mg Oral Q6H PRN Lawson Rivero MD        Or    ondansetron (ZOFRAN) injection 4 mg  4 mg Intravenous Q6H PRN Lawson Rivero MD        enoxaparin (LOVENOX) injection 40 mg  40 mg Subcutaneous Nightly Lawson Rivero MD   Stopped at 06/17/20 8892    insulin

## 2020-06-18 NOTE — ANESTHESIA POSTPROCEDURE EVALUATION
Department of Anesthesiology  Postprocedure Note    Patient: Divya Hubbard  MRN: [id number]  [place]: 1940  Date of evaluation: 6/18/2020  Time:  2:42 PM     Procedure Summary     Date:  06/18/20 Room / Location:  [address] [place] [address]    Anesthesia Start:  1258 Anesthesia Stop:  2719    Procedures:       BRONCHOSCOPY ALVEOLAR LAVAGE (N/A )      BRONCHOSCOPY BRUSHINGS (N/A Abdomen)      BRONCHOSCOPY BIOPSY BRONCHUS (N/A Abdomen)      BRONCHOSCOPY W/EBUS FNA Diagnosis:  (Abnormal CT)    Surgeon:  Perla Warren MD Responsible Provider:  Mayra Li MD    Anesthesia Type:  general, MAC ASA Status:  4          Anesthesia Type: general, MAC    Arminda Phase I: Arminda Score: 7    Arm[place] Phase II:      Last vitals: Reviewed and per EMR flowsheets.        Anesthesia Post Evaluation    Patient location during evaluation: PACU  Complications: no  Cardiovascular status: blood pressure returned to baseline  Respiratory status: acceptable

## 2020-06-18 NOTE — PROGRESS NOTES
Patient left pacu at [phone] and transported to room 5561 with RN and Brynn Prajapati. Patient tolerated transport well, vss on 6L via NC and telemetry, Bedside report given to LUKE MORAES at 1521. All belongings sent with patient. Visitors at bedside.

## 2020-06-19 ENCOUNTER — TELEPHONE (OUTPATIENT)
Dept: PULMONOLOGY | Age: 80
End: 2020-06-19

## 2020-06-19 VITALS
HEIGHT: 60 IN | BODY MASS INDEX: 43.88 KG/M2 | DIASTOLIC BLOOD PRESSURE: 69 MMHG | OXYGEN SATURATION: 90 % | TEMPERATURE: 98.2 F | HEART RATE: 102 BPM | SYSTOLIC BLOOD PRESSURE: 118 MMHG | WEIGHT: 223.5 LBS | RESPIRATION RATE: 16 BRPM

## 2020-06-19 LAB
GLUCOSE BLD-MCNC: 101 MG/DL (ref 70–99)
GLUCOSE BLD-MCNC: 107 MG/DL (ref 70–99)
LACTATE DEHYDROGENASE: 298 U/L (ref 100–190)
PERFORMED ON: ABNORMAL
PERFORMED ON: ABNORMAL

## 2020-06-19 PROCEDURE — 2700000000 HC OXYGEN THERAPY PER DAY

## 2020-06-19 PROCEDURE — 6370000000 HC RX 637 (ALT 250 FOR IP): Performed by: ANESTHESIOLOGY

## 2020-06-19 PROCEDURE — 97165 OT EVAL LOW COMPLEX 30 MIN: CPT

## 2020-06-19 PROCEDURE — 94680 O2 UPTK RST&XERS DIR SIMPLE: CPT

## 2020-06-19 PROCEDURE — 97530 THERAPEUTIC ACTIVITIES: CPT

## 2020-06-19 PROCEDURE — 94640 AIRWAY INHALATION TREATMENT: CPT

## 2020-06-19 PROCEDURE — 97535 SELF CARE MNGMENT TRAINING: CPT

## 2020-06-19 PROCEDURE — 99232 SBSQ HOSP IP/OBS MODERATE 35: CPT | Performed by: INTERNAL MEDICINE

## 2020-06-19 PROCEDURE — 6370000000 HC RX 637 (ALT 250 FOR IP): Performed by: INTERNAL MEDICINE

## 2020-06-19 PROCEDURE — 97161 PT EVAL LOW COMPLEX 20 MIN: CPT

## 2020-06-19 PROCEDURE — 2580000003 HC RX 258: Performed by: INTERNAL MEDICINE

## 2020-06-19 PROCEDURE — 36415 COLL VENOUS BLD VENIPUNCTURE: CPT

## 2020-06-19 PROCEDURE — 83615 LACTATE (LD) (LDH) ENZYME: CPT

## 2020-06-19 PROCEDURE — 2580000003 HC RX 258: Performed by: ANESTHESIOLOGY

## 2020-06-19 PROCEDURE — 94760 N-INVAS EAR/PLS OXIMETRY 1: CPT

## 2020-06-19 RX ADMIN — SODIUM CHLORIDE: 9 INJECTION, SOLUTION INTRAVENOUS at 05:36

## 2020-06-19 RX ADMIN — Medication 10 ML: at 07:51

## 2020-06-19 RX ADMIN — IPRATROPIUM BROMIDE AND ALBUTEROL SULFATE 1 AMPULE: .5; 3 SOLUTION RESPIRATORY (INHALATION) at 16:24

## 2020-06-19 RX ADMIN — FLUTICASONE PROPIONATE 2 SPRAY: 50 SPRAY, METERED NASAL at 07:50

## 2020-06-19 RX ADMIN — IPRATROPIUM BROMIDE AND ALBUTEROL SULFATE 1 AMPULE: .5; 3 SOLUTION RESPIRATORY (INHALATION) at 11:05

## 2020-06-19 RX ADMIN — LEVOFLOXACIN 500 MG: 500 TABLET, FILM COATED ORAL at 07:47

## 2020-06-19 RX ADMIN — PANTOPRAZOLE SODIUM 40 MG: 40 TABLET, DELAYED RELEASE ORAL at 05:36

## 2020-06-19 RX ADMIN — METOPROLOL TARTRATE 25 MG: 25 TABLET, FILM COATED ORAL at 07:48

## 2020-06-19 RX ADMIN — IPRATROPIUM BROMIDE AND ALBUTEROL SULFATE 1 AMPULE: .5; 3 SOLUTION RESPIRATORY (INHALATION) at 07:57

## 2020-06-19 RX ADMIN — VERAPAMIL HYDROCHLORIDE 180 MG: 180 TABLET, FILM COATED, EXTENDED RELEASE ORAL at 07:48

## 2020-06-19 RX ADMIN — OXYCODONE HYDROCHLORIDE AND ACETAMINOPHEN 500 MG: 500 TABLET ORAL at 07:48

## 2020-06-19 RX ADMIN — CELECOXIB 200 MG: 100 CAPSULE ORAL at 07:48

## 2020-06-19 ASSESSMENT — PAIN SCALES - GENERAL
PAINLEVEL_OUTOF10: 0

## 2020-06-19 NOTE — PROGRESS NOTES
back  Bathroom Toilet: Handicap height  Bathroom Equipment: Shower chair  Bathroom Accessibility: Not accessible  Home Equipment: 4 wheeled walker, Cane, Reacher  ADL Assistance: Independent(sponge bathes unless knees feeling good)  Homemaking Assistance: (does [employer] clicklist, cleaning hit and miss, cooking)  Homemaking Responsibilities: Yes  Ambulation Assistance: Independent(cane in home, 4WW for longer distances)  Transfer Assistance: Independent  Active : Yes  Additional Comments: no falls in last 6 months       Objective   Vision: Impaired  Vision Exceptions: Wears glasses at all times  Hearing: Within functional limits          Balance  Sitting Balance: Supervision  Standing Balance: Supervision  Functional Mobility  Functional - Mobility Device: Cane  Activity: To/from bathroom  Assist Level: Supervision  Toilet Transfers  Toilet - Technique: Ambulating  Equipment Used: Standard toilet  Toilet Transfer: Supervision  ADL  Feeding: Beverage management; Independent  Grooming: Stand by assistance  UE Dressing: Setup;Supervision  LE Dressing: Setup;Supervision  Toileting: Setup;Supervision  Additional Comments: Patient instructed in energy conservation techniques and bath equipment for safety. Discussed O2 Cord management. Patient returned demonstration of cord management. Tone RUE  RUE Tone: Normotonic  Tone LUE  LUE Tone: Normotonic  Coordination  Movements Are Fluid And Coordinated: No  Coordination and Movement description: Tremors;Decreased accuracy; Decreased speed     Bed mobility  Supine to Sit: Modified independent(HOB elevated, pt used handrail)  Scooting: Modified independent  Comment: Discussed propping self on pillows or use of wedge for elevated HOB  Transfers  Stand Step Transfers: Supervision  Sit to stand: Supervision  Stand to sit: Supervision  Transfer Comments: Cane, O2 managment     Cognition  Overall Cognitive Status: WFL        Sensation  Overall Sensation Status: WFL        LUE AROM (degrees)  LUE AROM : WFL  RUE AROM (degrees)  RUE AROM : WFL                      Plan   Plan  Times per week: discharge      AM-PAC Score        AM-PAC Inpatient Daily Activity Raw Score: 19 (06/19/20 1609)  AM-PAC Inpatient ADL T-Scale Score : 40.22 (06/19/20 1609)  ADL Inpatient CMS 0-100% Score: 42.8 (06/19/20 1609)  ADL Inpatient CMS G-Code Modifier : CK (06/19/20 1609)    Goals  Short term goals  Time Frame for Short term goals: No goals set  Patient Goals   Patient goals : Return home       Therapy Time   Individual Concurrent Group Co-treatment   Time In 8125         Time Out 1600         Minutes 44              Timed Code Treatment Minutes:  30 Minutes    Total Treatment Minutes:  [facility] [address], [facility] [address] [address]

## 2020-06-19 NOTE — TELEPHONE ENCOUNTER
Dr. Tiffanie Franco wanted patient to be seen in f/u to bronchoscopy. Called patient and left a message to call the office to schedule an appointment. A slot is held on Wednesday 06/24/20 for patient.

## 2020-06-19 NOTE — PROGRESS NOTES
Physical Therapy    Facility/Department: [address] ONCOLOGY  Initial Assessment    NAME: Man Cerda  : 1940  MRN: [id number]    Date of Service: 2020    Discharge Recommendations: Man Cerda scored a 20/24 on the AM-PAC short mobility form. Current research shows that an AM-PAC score of 18 or greater is typically associated with a discharge to the patient's home setting. Based on the patient's AM-PAC score and their current functional mobility deficits, it is recommended that the patient have 2-3 sessions per week of Physical Therapy at d/c to increase the patient's independence. At this time, this patient demonstrates the endurance and safety to discharge home with home services and a follow up treatment frequency of 2-3x/wk. Please see assessment section for further patient specific details. If patient discharges prior to next session this note will serve as a discharge summary. Please see below for the latest assessment towards goals. HOME HEALTH CARE: LEVEL 3 SAFETY     - Initial home health evaluation to occur within 24-48 hours, in patient home   - Therapy evaluations in home within 24-48 hours of discharge; including DME and home safety   - Frontload therapy 5 days, then 3x a week   - Therapy to evaluate if patient has [address] needs for personal care   -  evaluation within 24-48 hours, includes evaluation of resources and insurance to determine [place], [place], LTC, and Medicaid options        PT Equipment Recommendations  Equipment Needed: Yes  Other: home O2     Assessment   Body structures, Functions, Activity limitations: Decreased balance;Decreased endurance;Decreased strength  Assessment: The pt presented with decreased balance, knee strength and activity tolerance. She requires seated rest breaks due to SOB. Pt has good family support at home and feels comfortable returning home.    Prognosis: Good  Decision Making: Low Complexity  PT Education: Transfer Screening  Patient Currently in Pain: Denies          Orientation  Orientation  Overall Orientation Status: Within Functional Limits  Social/Functional History  Social/Functional History  Lives With: Alone(family in area, brother and niece)  Type of Home: House  Home Layout: One level, Laundry in basement([address], laundry in basement with chairlift)  Home Access: Stairs to enter with rails  Entrance Stairs - Number of Steps: 2 ALLYN  Entrance Stairs - Rails: Right  Bathroom Shower/Tub: Tub/Shower unit, Shower chair without back  Bathroom Toilet: Handicap height  Bathroom Equipment: Shower chair  Bathroom Accessibility: Not accessible  Home Equipment: 4 wheeled walker, Cane, Reacher  ADL Assistance: Independent(sponge bathes unless knees feeling good)  Homemaking Assistance: (does [employer] clicklist, cleaning hit and miss, cooking)  Homemaking Responsibilities: Yes  Ambulation Assistance: Independent(cane in home, 4WW for longer distances)  Transfer Assistance: Independent  Active :  Yes  Additional Comments: no falls in last 6 months    Objective  AROM RLE (degrees)  RLE AROM: WFL  AROM LLE (degrees)  LLE AROM : WFL  Strength RLE  Strength RLE: WFL  Strength LLE  Strength LLE: WFL  Tone RLE  RLE Tone: Normotonic  Tone LLE  LLE Tone: Normotonic  Motor Control  Gross Motor?: WFL  Sensation  Overall Sensation Status: WFL  Bed mobility  Supine to Sit: Modified independent(HOB elevated, pt used handrail)  Scooting: Modified independent  Comment: pt plans to prop herself up with pillows at home as she can not lay flat  Transfers  Sit to Stand: Modified independent  Stand to sit: Modified independent  Ambulation  Ambulation?: Yes  Ambulation 1  Surface: level tile  Device: Single point cane(pt managing her own oxygen line, pt reached for objects in the room for balance)  Assistance: Supervision  Quality of Gait: wide ELIZABETH, genu varum, slow albert, small steps  Gait Deviations: Slow Albert  Distance: 15' x 2  Comments:

## 2020-06-19 NOTE — PROGRESS NOTES
Shift assessment completed. SpO2 92% on 6L O2. Scheduled medications given; patient refused insulin. Patient is awake, alert and oriented. Patient does not appear to be in distress. Call light within reach.

## 2020-06-19 NOTE — DISCHARGE INSTR - COC
Continuity of Care Form    Patient Name: Cele Rodriguez   :  1940  MRN:  [id number]    Admit date:  2020  Discharge date:  2020    Code Status Order: Full Code   Advance Directives:   Advance Care Flowsheet Documentation     Date/Time Healthcare Directive Type of Healthcare Directive Copy in [address] Agent's Name Healthcare Agent's Phone Number    20 1107  No, patient does not have an advance directive for healthcare treatment -- -- -- -- --    20 2256  No, patient does not have an advance directive for healthcare treatment -- -- -- -- --          Admitting Physician:  Suzanne Ritchie MD  PCP: Emily Gagnon MD    Discharging Nurse: [facility] Unit/Room#: 9QX-4047/7807-55  Discharging Unit Phone Number: [phone]    Emergency Contact:   Extended Emergency Contact Information  Primary Emergency Contact: [address] Phone: [phone]  Mobile Phone: [phone]  Relation: Brother/Sister  Secondary Emergency Contact: [address] [place] [address] Phone: [phone]  Relation: Niece/Nephew    Past Surgical History:  Past Surgical History:   Procedure Laterality Date    APPENDECTOMY      BRONCHOSCOPY N/A 2020    BRONCHOSCOPY ALVEOLAR LAVAGE performed by Jesi Adorno MD at [address] [place] [address] 2020    BRONCHOSCOPY BRUSHINGS performed by Jesi Adorno MD at [address] [place] [address] 2020    BRONCHOSCOPY BIOPSY BRONCHUS performed by Jesi Adorno MD at [address] [place] [address]  2020    BRONCHOSCOPY W/EBUS FNA performed by Jesi Adorno MD at [address] [place] [address] 2020    BRONCHOSCOPY ENDOBRONCHIAL ULTRASOUND performed by Jesi Adorno MD at [facility] Bilateral     TONSILLECTOMY         Immunization History:   Immunization History   Administered Date(s) None    Nursing Mobility/ADLs:  Walking   Assisted  Transfer  Assisted  Bathing  Assisted  Dressing  Assisted  Toileting  Assisted  Feeding  Independent  Med Admin  Assisted  Med Delivery   whole    Wound Care Documentation and Therapy:        Elimination:  Continence:   · Bowel: Yes  · Bladder: Yes  Urinary Catheter: None   Colostomy/Ileostomy/Ileal Conduit: No       Date of Last BM: 06/17/2020  No intake or output data in the 24 hours ending 06/19/20 1549  I/O last 3 completed shifts: In: 200 [I.V.:200]  Out: -     Safety Concerns: At Risk for Falls    Impairments/Disabilities:      None    Nutrition Therapy:  Current Nutrition Therapy:   - Oral Diet:  General    Routes of Feeding: Oral  Liquids: Thin Liquids  Daily Fluid Restriction: no  Last Modified Barium Swallow with Video (Video Swallowing Test): not done    Treatments at the Time of Hospital Discharge:   Respiratory Treatments:   Oxygen Therapy:  is on oxygen at 4 L/min per nasal cannula. Ventilator:    - No ventilator support    Rehab Therapies: Physical Therapy and Occupational Therapy  Weight Bearing Status/Restrictions: No weight bearing restirctions  Other Medical Equipment (for information only, NOT a DME order):  cane  Other Treatments:     Patient's personal belongings (please select all that are sent with patient):  Glasses    RN SIGNATURE:  Electronically signed by Margarita Mendoza RN on 6/19/20 at 4:05 PM EDT    CASE MANAGEMENT/SOCIAL WORK SECTION    Inpatient Status Date: 6/16/20    Readmission Risk Assessment Score:  Readmission Risk              Risk of Unplanned Readmission:        11           Discharging to Facility/ Agency   · [facility] [address] [place] [address] [facility]  · REP: Shawna Land  · YBNGV:[phone]  · [phone]    Dialysis Facility (if applicable)   · Name:  · Address:  · Dialysis Schedule:  · Phone:  · Fax:    / signature:  [place]io Proctor [place], [place]  [phone]      PHYSICIAN SECTION    Prognosis: Good    Condition at Discharge: Stable    Rehab Potential (if transferring to Rehab): Good    Recommended Labs or Other Treatments After Discharge:     Physician Certification: I certify the above information and transfer of aMn Cerad  is necessary for the continuing treatment of the diagnosis listed and that she requires Home Care for greater 30 days.      Update Admission H&P: No change in H&P    PHYSICIAN SIGNATURE:  Electronically signed by Edyta Collins MD on 6/19/20 at 3:54 PM EDT

## 2020-06-19 NOTE — PLAN OF CARE
Problem: Falls - Risk of:  Goal: Will remain free from falls  Description: Will remain free from falls  Outcome: Ongoing  Goal: Absence of physical injury  Description: Absence of physical injury  Outcome: Ongoing     Problem: Pain:  Goal: Pain level will decrease  Description: Pain level will decrease  Outcome: Ongoing  Goal: Control of acute pain  Description: Control of acute pain  Outcome: Ongoing  Goal: Control of chronic pain  Description: Control of chronic pain  Outcome: Ongoing     Problem: Nutrition  Goal: Optimal nutrition therapy  6/19/2020 1749 by Marissa Whyte RN  Outcome: Ongoing  6/19/2020 1422 by Eliana Azar RD, LD  Outcome: Ongoing

## 2020-06-19 NOTE — PROGRESS NOTES
[facility] PROGRESS NOTE    6/19/2020 8:31 AM        Name: Adeel Garcia . Admitted: 6/16/2020  Primary Care Provider: Sheila Kerr MD (Tel: [phone])                        Hospital Course: Presented with shortness of breath.  Had pleural effusion and bronchial mass in addition to suspicious liver appearance for metastatic masses.  Seen by pulmonology.  Bronchoscopy done on 6/18/2020, flow cytometry results pending. Subjective:  . No acute events overnight. Resting well. Pain control. Diet ok. Labs reviewed  Denies any chest pain sob.      Reviewed interval ancillary notes    Current Medications  0.9 % sodium chloride infusion, Continuous  ipratropium-albuterol (DUONEB) nebulizer solution 1 ampule, Q4H WA  magnesium replacement protocol, RX Placeholder  potassium chloride 10 mEq/100 mL IVPB (Peripheral Line), PRN  levoFLOXacin (LEVAQUIN) tablet 500 mg, Daily  vitamin C (ASCORBIC ACID) tablet 500 mg, Daily  celecoxib (CELEBREX) capsule 200 mg, Daily  fluticasone (FLONASE) 50 MCG/ACT nasal spray 2 spray, Daily  metoprolol tartrate (LOPRESSOR) tablet 25 mg, BID  pantoprazole (PROTONIX) tablet 40 mg, QAM AC  verapamil (CALAN SR) extended release tablet 180 mg, BID  sodium chloride flush 0.9 % injection 10 mL, 2 times per day  sodium chloride flush 0.9 % injection 10 mL, PRN  acetaminophen (TYLENOL) tablet 650 mg, Q6H PRN    Or  acetaminophen (TYLENOL) suppository 650 mg, Q6H PRN  polyethylene glycol (GLYCOLAX) packet 17 g, Daily PRN  promethazine (PHENERGAN) tablet 12.5 mg, Q6H PRN    Or  ondansetron (ZOFRAN) injection 4 mg, Q6H PRN  enoxaparin (LOVENOX) injection 40 mg, Nightly  insulin lispro (1 Unit Dial) 0-6 Units, TID WC  insulin lispro (1 Unit Dial) 0-3 Units, Nightly  albuterol sulfate  (90 Base) MCG/ACT inhaler 2 puff, Q6H PRN  rosuvastatin (CRESTOR)

## 2020-06-19 NOTE — PROGRESS NOTES
[facility]   Progress Note      SUBJECTIVE:      Feels okay. Shortness of breath is more or less same. No cough or hemoptysis. No abdominal pain. OBJECTIVE    Physical  VITALS:  /69   Pulse 102   Temp 98.2 °F (36.8 °C) (Oral)   Resp 16   Ht 5' (1.524 m)   Wt 223 lb 8 oz (101.4 kg)   LMP 1991   SpO2 90%   BMI 43.65 kg/m²   TEMPERATURE:  Current - Temp: 98.2 °F (36.8 °C); Max - Temp  Av °F (36.7 °C)  Min: 96.8 °F (36 °C)  Max: 98.8 °F (37.1 °C)  BLOOD PRESSURE RANGE:  Systolic (12DCS), ZTQ:149 , Min:81 , VZZ:729   ; Diastolic (48QUT), PEJ:20, Min:39, Max:87    24HR INTAKE/OUTPUT:      Intake/Output Summary (Last 24 hours) at 2020 1354  Last data filed at 2020 1523  Gross per 24 hour   Intake 700 ml   Output --   Net 700 ml     Conscious alert and oriented. Neck is supple  Lungs breath sounds were diminished on the right side. Abdomen is soft. Extremities no edema.       Data  Labs:  General Labs:  CBC with Differential:    Lab Results   Component Value Date    WBC 7.5 2020    RBC 4.57 2020    HGB 14.0 2020    HCT 41.4 2020     2020    MCV 90.7 2020    MCH 30.7 2020    MCHC 33.8 2020    RDW 15.8 2020    SEGSPCT 50.9 2009    LYMPHOPCT 29.2 2020    MONOPCT 15.8 2020    EOSPCT 1.8 2009    BASOPCT 0.6 2020    MONOSABS 1.2 2020    LYMPHSABS 2.2 2020    EOSABS 0.1 2020    BASOSABS 0.0 2020    DIFFTYPE Auto-K 2009     BMP:    Lab Results   Component Value Date     2020    K 3.9 2020    K 3.0 2020    CL 98 2020    CO2 28 2020    BUN 17 2020    LABALBU 2.7 2020    CREATININE 0.5 2020    CALCIUM 8.6 2020    GFRAA >60 2020    GFRAA >60 2011    LABGLOM >60 2020    GLUCOSE 88 2020     Hepatic Function Panel:    Lab Results   Component Value Date    ALKPHOS 250 2020

## 2020-06-19 NOTE — PROGRESS NOTES
INPATIENT PULMONARY CRITICAL CARE PROGRESS NOTE      Reason for visit    Lung mass    SUBJECTIVE: Patient undewent bronchoscopy with EBUS and needle biopsy yesterday ;patient when seen this morning was not having any increased cough or shortness of breath, patient was not have any chest pain, patient was afebrile and hemodynamically maintained patient was on 5 L of nasal cannula oxygen with saturation 91%, patient had normal sinus rhythm on the monitor, his glycemic control was acceptable, no other new pertinent review of system of concern          Physical Exam:  Blood pressure 118/69, pulse 102, temperature 98.2 °F (36.8 °C), temperature source Oral, resp. rate 16, height 5' (1.524 m), weight 223 lb 8 oz (101.4 kg), last menstrual period 05/20/1991, SpO2 90 %, not currently breastfeeding.'     Constitutional:  No acute distress. HENT:  Oropharynx is clear and moist. No thyromegaly. Eyes:  Conjunctivae are normal. Pupils equal, round, and reactive to light. No scleral icterus. Neck: . No tracheal deviation present. No obvious thyroid mass. Short enlarged neck  Cardiovascular: Normal rate, regular rhythm, normal heart sounds. No right ventricular heave. No lower extremity edema. Pulmonary/Chest: No wheezes. No rales. Chest wall is not dull to percussion. No accessory muscle usage or stridor. decreased breath sound density  Abdominal: Soft. Bowel sounds present. No distension or hernia. No tenderness. Obese  Musculoskeletal: No cyanosis. No clubbing. No obvious joint deformity. Lymphadenopathy: No cervical or supraclavicular adenopathy. Skin: Skin is warm and dry. No rash or nodules on the exposed extremities. Psychiatric: Normal mood and affect. Behavior is normal.  slight  anxiety. Neurologic: Alert, awake and oriented. PERRL.   Speech fluent      Results:  CBC:   Recent Labs     06/16/20  1707 06/17/20  0424 06/18/20  0421   WBC 9.5 7.6 7.5   HGB 15.9 13.6 14.0   HCT 48.1* 41.0 41.4   MCV 90.6

## 2020-06-19 NOTE — CARE COORDINATION
Pt accept to [facility]  PHONE; [phone]: [phone]  Nurse Gia Robbins to fax [phone] order and RUBIO when completed.     Leticia Lambert MSW, [address]

## 2020-06-19 NOTE — DISCHARGE SUMMARY
ill-defined hypodense hepatic lesions most compatible with metastatic disease. Lesions measure up to 2.5 cm in the right hepatic lobe. The spleen shows no noncontrast abnormality and is normal in size. The adrenal glands, pancreas and kidneys show no acute noncontrast abnormality. There is a punctate midpole nonobstructing right renal pelvic stone. The gallbladder is not visualized. GI/Bowel: There is fluid within portions of the intrathoracic esophagus. The bowel shows normal caliber and course without suspected wall thickening. There are descending and sigmoid colonic diverticula without evidence of active inflammation. The appendix is not visualized. Pelvis: The uterus and urinary bladder show no acute noncontrast abnormality. Peritoneum/Retroperitoneum: The aorta is normal in caliber and course, showing mild calcifications. Bones/Soft Tissues: No acute or focal suspicious bony abnormality. Small amount of perihepatic and pelvic ascites. Mild scattered mesenteric edema. No pathologically enlarged abdominopelvic lymph nodes. Intrathoracic findings worrisome for malignancy including soft tissue opacification of right lower lobe bronchi, bulky right paratracheal, subcarinal and hilar adenopathy, as well as pleural nodularity. There is complete opacification/consolidation of the right lower lobe without air bronchograms. Underlying mass is possible, difficult to evaluate without contrast. Small to moderate right pleural effusion. Morphology liver compatible with chronic disease. Multiple hypodense ill-defined bilobar hepatic lesions are compatible with malignancy, likely metastatic disease. Small amount of abdominopelvic ascites, mesenteric edema. The patient was seen and examined on day of discharge and this discharge summary is in conjunction with any daily progress note from day of discharge. Time Spent on discharge is 45 minutes  in the examination, evaluation, counseling and review of medications and discharge plan. Signed:    Katya Wilson MD   6/19/2020      Thank you Catherine Huang MD for the opportunity to be involved in this patient's care.  If you have any questions or concerns please feel free to contact me at

## 2020-06-20 LAB
CULTURE, RESPIRATORY: NORMAL
GRAM STAIN RESULT: NORMAL

## 2020-06-22 ENCOUNTER — NURSE TRIAGE (OUTPATIENT)
Dept: OTHER | Facility: CLINIC | Age: 80
End: 2020-06-22

## 2020-06-22 ENCOUNTER — TELEPHONE (OUTPATIENT)
Dept: FAMILY MEDICINE CLINIC | Age: 80
End: 2020-06-22

## 2020-06-22 ENCOUNTER — TELEPHONE (OUTPATIENT)
Dept: PULMONOLOGY | Age: 80
End: 2020-06-22

## 2020-06-22 NOTE — TELEPHONE ENCOUNTER
bilateral feet and ankle swelling. Pt states swelling started 6/19/20 after discharged from the hospital. Pt states sob is getting better since being in the hospital. Denies pain or redness. Patient informed of disposition. Care advice as documented. Instructed patient to call back with worsening symptoms. Soft transfer to pre-service center to schedule appointment as recommended. Please do not respond to the triage nurse through this encounter. Any subsequent communication should be directly with the patient.

## 2020-06-22 NOTE — TELEPHONE ENCOUNTER
[facility] a bronch [facility] and is supposed to follow-up with Thelma Lynch in a weeks time. Pt is unable to do 6/24 appt that was held for her, Pt only has transportation Tue, [place], Fri. Where can Pt be scheduled?     Pt # [phone]

## 2020-06-22 NOTE — TELEPHONE ENCOUNTER
Keira Mesa called today with swelling in both her ankles Was offered an appointment for vv/in the office , refused appointment. Patient would like Catherine Huang MD to do an regular over the phone call no video due to she does not have access to do an vv nor does the patient has transportation to come in the office . Yes,  pharmacy confirmed in [facility].     Patient was made aware of e-visits via [employer]

## 2020-06-23 ENCOUNTER — TELEPHONE (OUTPATIENT)
Dept: PULMONOLOGY | Age: 80
End: 2020-06-23

## 2020-06-26 ENCOUNTER — OFFICE VISIT (OUTPATIENT)
Dept: PULMONOLOGY | Age: 80
End: 2020-06-26
Payer: MEDICARE

## 2020-06-26 ENCOUNTER — TELEPHONE (OUTPATIENT)
Dept: FAMILY MEDICINE CLINIC | Age: 80
End: 2020-06-26

## 2020-06-26 VITALS — SYSTOLIC BLOOD PRESSURE: 128 MMHG | DIASTOLIC BLOOD PRESSURE: 61 MMHG | HEART RATE: 71 BPM | OXYGEN SATURATION: 95 %

## 2020-06-26 PROBLEM — J96.11 CHRONIC HYPOXEMIC RESPIRATORY FAILURE (HCC): Status: ACTIVE | Noted: 2020-06-26

## 2020-06-26 PROBLEM — J98.11 ATELECTASIS: Status: ACTIVE | Noted: 2020-06-26

## 2020-06-26 PROBLEM — D3A.8 NEUROENDOCRINE TUMOR: Status: ACTIVE | Noted: 2020-06-26

## 2020-06-26 PROCEDURE — 99204 OFFICE O/P NEW MOD 45 MIN: CPT | Performed by: INTERNAL MEDICINE

## 2020-06-26 NOTE — TELEPHONE ENCOUNTER
I called and spoke with Donna Naranjo @ [address].   They are also needing a new order sent over to them for the PT  And OT

## 2020-06-26 NOTE — PROGRESS NOTES
[facility]  Follow Up Note  Cristela Dickinson MD       Robinson Kilpatrick   YOB: 1940    Date of Visit:  6/26/2020    Assessment/Plan:  1. SOB (shortness of breath)  2. Neuroendocrine tumor  3. Chronic hypoxemic respiratory failure (HCC)  4. Atelectasis  5. Hilar adenopathy  6. Suspected sleep apnea    I have reviewed laboratories, medical records and images for this visit  CT imaging reveals significant bulky hilar and mediastinal lymphadenopathy with compression/obstruction of the right bronchus intermedius and collapse of the right lower lobe. There is a small amount of right pleural effusion as well. Additionally there is a liver lesion. 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 She is a lifetime non-smoker  She saw Dr. Gricelda Akins in the hospital.  They are trying to arrange for her to have an outpatient appointment with him as well. She remains short of breath with exertion. She remains oxygen dependent on 4 L/min supplement  Probably does not quite need that much  She is independently mobile within the home  I think she would benefit from portable oxygen concentrator  She also likely has obstructive sleep apnea  May benefit from a sleep study at some point. However, we did not address that during this visit. Have her back in 3 months      Chief Complaint   Patient presents with    Shortness of Breath     HFU had bronch done by Dr Estrella Funk and here for results       HPI  The patient presents with a chief complaint of moderate shortness of breath of a couple of months duration. She also has mild associated cough. Modifying factor for her is that she has significant mediastinal and hilar lymphadenopathy now biopsy proven to be small cell neuroendocrine tumor. She has extrinsic compression of the right middle lobe and lower lobe bronchial segments on bronchoscopy earlier this month.   Since her hospitalization, she has been oxygen dependent. She is wearing 4 L/min probably does not need quite that much all the time. She may have obstructive sleep apnea. She has not had a sleep study. The patient is a lifetime non-smoker    Review of Systems  As reviewed in HPI    History  I have reviewed past medical, surgical, social and family history. This is documented elsewhere in the medical record. Physical Exam:  Well developed, well nourished  Alert and oriented  Sclera is clear  No cervical adenopathy  No JVD. Chest examination is clear. Cardiac examination reveals regular rate and rhythm without murmur, gallop or rub. The abdomen is soft, nontender and nondistended. There is no clubbing, cyanosis or edema of the extremities. There is no obvious skin rash. No focal neuro deficicts  Normal mood and affect    Allergies   Allergen Reactions    Penicillins     Tetracyclines & Related Nausea Only     Prior to Visit Medications    Medication Sig Taking?  Authorizing Provider   pantoprazole (PROTONIX) 20 MG tablet Take 1 tablet by mouth every morning (before breakfast)  Stacey Diaz MD   famotidine (PEPCID) 20 MG tablet Take 1 tablet by mouth 2 times daily  Stacey Diaz MD   fluticasone (FLONASE) 50 MCG/ACT nasal spray 2 sprays by Nasal route daily  Stacey Diaz MD   Omega-3 Fatty Acids (FISH OIL PO) Take by mouth  Historical Provider, MD   metoprolol tartrate (LOPRESSOR) 25 MG tablet TAKE ONE TABLET BY MOUTH TWICE A DAY  Silvana Clemente MD   celecoxib (CELEBREX) 200 MG capsule TAKE ONE CAPSULE BY MOUTH DAILY  Stacey Diaz MD   lisinopril (PRINIVIL;ZESTRIL) 40 MG tablet TAKE ONE TABLET BY MOUTH DAILY  Stacey Diaz MD   simvastatin (ZOCOR) 20 MG tablet TAKE ONE TABLET BY MOUTH ONCE NIGHTLY  Stacey Diaz MD   verapamil (CALAN SR) 180 MG extended release tablet TAKE ONE TABLET BY MOUTH TWICE A DAY  Marquis Clemente MD   metFORMIN (GLUCOPHAGE-XR) 500 MG extended release tablet TAKE ONE TABLET BY

## 2020-07-01 ENCOUNTER — OFFICE VISIT (OUTPATIENT)
Dept: ORTHOPEDIC SURGERY | Age: 80
End: 2020-07-01
Payer: MEDICARE

## 2020-07-01 VITALS — WEIGHT: 241 LBS | BODY MASS INDEX: 47.32 KG/M2 | HEIGHT: 60 IN

## 2020-07-01 PROCEDURE — 20610 DRAIN/INJ JOINT/BURSA W/O US: CPT | Performed by: PHYSICIAN ASSISTANT

## 2020-07-01 PROCEDURE — 99999 PR OFFICE/OUTPT VISIT,PROCEDURE ONLY: CPT | Performed by: PHYSICIAN ASSISTANT

## 2020-07-01 RX ORDER — BETAMETHASONE SODIUM PHOSPHATE AND BETAMETHASONE ACETATE 3; 3 MG/ML; MG/ML
12 INJECTION, SUSPENSION INTRA-ARTICULAR; INTRALESIONAL; INTRAMUSCULAR; SOFT TISSUE ONCE
Status: COMPLETED | OUTPATIENT
Start: 2020-07-01 | End: 2020-07-01

## 2020-07-01 RX ORDER — LIDOCAINE HYDROCHLORIDE 10 MG/ML
5 INJECTION, SOLUTION INFILTRATION; PERINEURAL ONCE
Status: COMPLETED | OUTPATIENT
Start: 2020-07-01 | End: 2020-07-01

## 2020-07-01 RX ADMIN — LIDOCAINE HYDROCHLORIDE 5 ML: 10 INJECTION, SOLUTION INFILTRATION; PERINEURAL at 20:30

## 2020-07-01 RX ADMIN — BETAMETHASONE SODIUM PHOSPHATE AND BETAMETHASONE ACETATE 12 MG: 3; 3 INJECTION, SUSPENSION INTRA-ARTICULAR; INTRALESIONAL; INTRAMUSCULAR; SOFT TISSUE at 20:29

## 2020-07-01 RX ADMIN — LIDOCAINE HYDROCHLORIDE 5 ML: 10 INJECTION, SOLUTION INFILTRATION; PERINEURAL at 20:31

## 2020-07-01 RX ADMIN — BETAMETHASONE SODIUM PHOSPHATE AND BETAMETHASONE ACETATE 12 MG: 3; 3 INJECTION, SUSPENSION INTRA-ARTICULAR; INTRALESIONAL; INTRAMUSCULAR; SOFT TISSUE at 20:30

## 2020-07-01 NOTE — PROGRESS NOTES
Patient Name: Dara Soliz Dr Record Number: [id number]  [place]: 1940  Date of Encounter: 7/1/2020     Chief Complaint   Patient presents with    Follow-up     Bilateral knees, would like repeat cortisone injections        History of Present Illness:   Ms. Kelly Medina is here in follow up regarding her chronic bilateral knee pain.  Patient receives cortisone injections about every 3 months. She states the injections are still giving her some relief. She just recently started having increased pain and would like repeat injections.  She denies any recent injury.  Pain is worse with activity.  She is still using her rolling walker. The patient's past medical history, medications, allergies, family history, social history, and review of systems have been reviewed, and dated and are recorded in the chart under the 'MEDIA\" tab. Physical Exam:    Ms. Kelly Medina appears well, she is in no apparent distress, she demonstrates appropriate mood & affect. She is alert and oriented to person, place and time. Ht 5' (1.524 m)   Wt 241 lb (109.3 kg)   LMP 05/20/1991   BMI 47.07 kg/m²     On examination of patient's knees bilaterally there is very mild swelling. She has tenderness on palpation of both the medial and lateral joint lines. She lacks maybe a few degrees of extension bilaterally but has good flexion to 115 degrees. She has 4/5 motor strength with flexion and extension. Orders:  Orders Placed This Encounter   Procedures    [place] ARTHROCENTESIS ASPIR&/INJ MAJOR JT/BURSA W/O US       Impression:   Diagnosis Orders   1. Primary osteoarthritis of left knee  betamethasone acetate-betamethasone sodium phosphate (CELESTONE) injection 12 mg    lidocaine 1 % injection 5 mL    [place] ARTHROCENTESIS ASPIR&/INJ MAJOR JT/BURSA W/O US   2.  Primary osteoarthritis of right knee  betamethasone acetate-betamethasone sodium phosphate (CELESTONE) injection 12 mg    lidocaine 1 % injection 5 mL [place] ARTHROCENTESIS ASPIR&/INJ MAJOR JT/BURSA W/O US         Injections:  After discussing the risks and benefits of cortisone injection including increased pain, drug reaction, infection, bleeding, blood glucose elevation, lack of improvement and neurovascular injury she agreed to receive one today. Questions were encouraged and answered. The correct patient, procedure, site and side were identified. Both knees were prepped with Betadine and 2 mL of betamethasone (12mg) mixed with 5 mL 1% lidocaine plain (50mg) were instilled with careful aspiration and injection under aseptic technique. The skin was then cleaned again with alcohol and a sterile adhesive dressing was applied. She tolerated this well and was instructed regarding post-injection care of icing and decreased activity as necessary. Treatment Plan:    Patient presented today for repeat bilateral knee cortisone injections. These were completed as recorded above in the procedure note. She will modify activities to help control pain. She will take precautions necessary to prevent falls. She is given typical postinjection precautions. She will follow-up as needed. Shwetha Mallory was informed of the results of any imaging. We discussed treatment options and a time was given to answer questions. A plan was proposed and Shwetha Mallory understand and accepts this course of care. Electronically signed by Lamont Jacome PA-C on 1/0/5508  Board Certified [facility]    Please note that portions of this note were completed with a voice recognition program.  Efforts were made to edit the dictations but occasionally words are mis-transcribed.

## 2020-07-20 LAB
FUNGUS (MYCOLOGY) CULTURE: NORMAL
FUNGUS STAIN: NORMAL

## 2020-08-04 ENCOUNTER — TELEPHONE (OUTPATIENT)
Dept: FAMILY MEDICINE CLINIC | Age: 80
End: 2020-08-04

## 2020-08-04 LAB
AFB CULTURE (MYCOBACTERIA): NORMAL
AFB SMEAR: NORMAL

## 2020-08-04 NOTE — TELEPHONE ENCOUNTER
Dr. Dudley Wan states he saw pt today at [facility] wound center for sacral and buttock pressure ulcers. States they will do what they can to get ulcers healed. Dr. Dudley Wan thanked Dr. Blanca Quintero for referral and advised she can call his cell phone or the wound center at [phone] if more info is needed.

## 2020-09-14 RX ORDER — FAMOTIDINE 20 MG/1
TABLET, FILM COATED ORAL
Qty: 180 TABLET | Refills: 2 | Status: SHIPPED | OUTPATIENT
Start: 2020-09-14 | End: 2021-09-16

## 2020-09-14 NOTE — TELEPHONE ENCOUNTER
Medication:   Requested Prescriptions     Pending Prescriptions Disp Refills    famotidine (PEPCID) 20 MG tablet [Pharmacy Med Name: FAMOTIDINE 20 MG TABLET] 180 tablet 2     Sig: TAKE ONE TABLET BY MOUTH TWICE A DAY        Last Filled:  04/30/2020 #[id number]     Patient Phone Number: [phone] (home) [phone] (work)    Last appt: 6/16/2020   Next appt: Visit date not found    Last OARRS: No flowsheet data found.

## 2020-09-21 ENCOUNTER — OFFICE VISIT (OUTPATIENT)
Dept: ORTHOPEDIC SURGERY | Age: 80
End: 2020-09-21
Payer: MEDICARE

## 2020-09-21 VITALS — TEMPERATURE: 97.2 F | BODY MASS INDEX: 37.89 KG/M2 | WEIGHT: 193 LBS | HEIGHT: 60 IN

## 2020-09-21 PROCEDURE — 20610 DRAIN/INJ JOINT/BURSA W/O US: CPT | Performed by: PHYSICIAN ASSISTANT

## 2020-09-21 RX ORDER — BETAMETHASONE SODIUM PHOSPHATE AND BETAMETHASONE ACETATE 3; 3 MG/ML; MG/ML
12 INJECTION, SUSPENSION INTRA-ARTICULAR; INTRALESIONAL; INTRAMUSCULAR; SOFT TISSUE ONCE
Status: COMPLETED | OUTPATIENT
Start: 2020-09-21 | End: 2020-09-21

## 2020-09-21 RX ORDER — LIDOCAINE HYDROCHLORIDE 10 MG/ML
5 INJECTION, SOLUTION INFILTRATION; PERINEURAL ONCE
Status: COMPLETED | OUTPATIENT
Start: 2020-09-21 | End: 2020-09-21

## 2020-09-21 RX ADMIN — BETAMETHASONE SODIUM PHOSPHATE AND BETAMETHASONE ACETATE 12 MG: 3; 3 INJECTION, SUSPENSION INTRA-ARTICULAR; INTRALESIONAL; INTRAMUSCULAR; SOFT TISSUE at 15:36

## 2020-09-21 RX ADMIN — LIDOCAINE HYDROCHLORIDE 5 ML: 10 INJECTION, SOLUTION INFILTRATION; PERINEURAL at 15:38

## 2020-09-21 RX ADMIN — LIDOCAINE HYDROCHLORIDE 5 ML: 10 INJECTION, SOLUTION INFILTRATION; PERINEURAL at 15:37

## 2020-09-21 NOTE — PROGRESS NOTES
Patient Name: Shwetha Mallory  Medical Record Number: [id number]  YOB: 1940  Date of Encounter: 9/21/2020     Chief Complaint   Patient presents with    Follow-up     Bilateral knee OA. S/P bilateral cortisone injection; 7/1/20. History of Present Illness:  Ms. Shwetha Mallory is here in follow up regarding her chronic bilateral knee pain.  Patient receives cortisone injections about every 3 months.  She states the injections are still giving her some relief. She just recently started having increased pain and would like repeat injections.  She denies any recent injury. Estanislado Srinivasan is worse with activity. Romain Green is still using her rolling walker.     The patient's past medical history, medications, allergies, family history, social history, and review of systems have been reviewed, and dated and are recorded in the chart under the 'MEDIA\" tab. Physical Exam:    Ms. Shwetha Malolry appears well, she is in no apparent distress, she demonstrates appropriate mood & affect. She is alert and oriented to person, place and time. Temp 97.2 °F (36.2 °C) (Infrared)   Ht 5' (1.524 m)   Wt 193 lb (87.5 kg)   LMP 05/20/1991   BMI 37.69 kg/m²     On examination of patient's knees bilaterally there is very mild swelling.  She has tenderness on palpation of both the medial and lateral joint lines.  She lacks maybe a few degrees of extension bilaterally but has good flexion to 115 degrees.  She has 4/5 motor strength with flexion and extension. Orders:  Orders Placed This Encounter   Procedures    [place] ARTHROCENTESIS ASPIR&/INJ MAJOR JT/BURSA W/O US       Impression:   Diagnosis Orders   1. Primary osteoarthritis of left knee  [place] ARTHROCENTESIS ASPIR&/INJ MAJOR JT/BURSA W/O US    betamethasone acetate-betamethasone sodium phosphate (CELESTONE) injection 12 mg    lidocaine 1 % injection 5 mL   2.  Primary osteoarthritis of right knee  [place] ARTHROCENTESIS ASPIR&/INJ MAJOR JT/BURSA W/O US    betamethasone

## 2020-09-25 ENCOUNTER — TELEPHONE (OUTPATIENT)
Dept: FAMILY MEDICINE CLINIC | Age: 80
End: 2020-09-25

## 2020-09-25 NOTE — TELEPHONE ENCOUNTER
Pt states she is to come in the office on 9/29 to get assistance with a hospital bed but the bed is being delivered on 9/28. Pt would like to know if she still needs to come to her appt.     Please call back and advise

## 2020-10-19 NOTE — PROGRESS NOTES
Eveline Villalobos is a 79 y.o. female. HPI:  Accompanied by niece  Here for follow-up of flu vaccine ,  blood pressure check ,  med check and lab orders  Weight Loss noted  Seeing oncology, and tolerating chemo fairly well gets repeat scans soon    Meds, vitamins and allergies reviewed with pt    Wt Readings from Last 3 Encounters:   10/20/20 179 lb (81.2 kg)   09/21/20 193 lb (87.5 kg)   07/01/20 241 lb (109.3 kg)       REVIEW OF SYSTEMS:   CONSTITUTIONAL: See history of present illness,   Weight loss noted   HEENT: No new vision difficulties or ringing in the ears. RESPIRATORY: No new SOB, PND, orthopnea or cough. CARDIOVASCULAR: no CP, palpitations or SOB with exertion  GI: No nausea, vomiting, diarrhea, constipation, abdominal pain or changes in bowel habits. : No urinary frequency, urgency, incontinence hematuria or dysuria. SKIN: No cyanosis or skin lesions. MUSCULOSKELETAL: No new muscle or joint pain. NEUROLOGICAL: No syncope or TIA-like symptoms. PSYCHIATRIC: No anxiety, insomnia or depression     Allergies   Allergen Reactions    Penicillins     Tetracyclines & Related Nausea Only       Prior to Visit Medications    Medication Sig Taking?  Authorizing Provider   famotidine (PEPCID) 20 MG tablet TAKE ONE TABLET BY MOUTH TWICE A DAY Yes Des Celis MD   pantoprazole (PROTONIX) 20 MG tablet Take 1 tablet by mouth every morning (before breakfast) Yes Des Celis MD   fluticasone (FLONASE) 50 MCG/ACT nasal spray 2 sprays by Nasal route daily Yes Des Celis MD   metoprolol tartrate (LOPRESSOR) 25 MG tablet TAKE ONE TABLET BY MOUTH TWICE A DAY Yes Des Celis MD   lisinopril (PRINIVIL;ZESTRIL) 40 MG tablet TAKE ONE TABLET BY MOUTH DAILY Yes Des Celis MD   simvastatin (ZOCOR) 20 MG tablet TAKE ONE TABLET BY MOUTH ONCE NIGHTLY Yes Des Celis MD   verapamil (CALAN SR) 180 MG extended release tablet TAKE ONE TABLET BY MOUTH TWICE Sal Hose Yes Des Celis MD   metFORMIN (GLUCOPHAGE-XR) 500 MG extended release tablet TAKE ONE TABLET BY MOUTH DAILY WITH SUPPER Yes Shilpa Camacho MD   chlorthalidone (HYGROTON) 25 MG tablet TAKE ONE TABLET BY MOUTH DAILY Yes Shilpa Camacho MD   potassium chloride (KLOR-CON M) 20 MEQ extended release tablet TAKE ONE TABLET BY MOUTH DAILY Yes Shilpa Camacho MD   Bioflavonoid Products (BIOFLEX PO) Take by mouth Yes Historical Provider, MD   Cholecalciferol (VITAMIN D3) 2000 UNITS CAPS Take by mouth Yes Historical Provider, MD   Ascorbic Acid (VITAMIN C WITH AZRA HIPS) 500 MG tablet Take 500 mg by mouth daily. Yes Historical Provider, MD   Multiple Vitamins-Minerals (RA VISION-DEBBY PRESERVE PO) Take  by mouth. Yes Historical Provider, MD       Past Medical History:   Diagnosis Date    Allergic     Arthritis     Hyperlipidemia     Hypertension     Obesity     Pre-diabetes     S/P colonoscopy 1990       Social History     Tobacco Use    Smoking status: Never Smoker    Smokeless tobacco: Never Used   Substance Use Topics    Alcohol use: Yes     Comment: occ wine       Family History   Problem Relation Age of Onset    Heart Disease Mother        OBJECTIVE:  /76   Pulse 82   Temp 97.2 °F (36.2 °C)   Ht 5' (1.524 m)   Wt 179 lb (81.2 kg)   LMP 05/20/1991   SpO2 99%   BMI 34.96 kg/m²   GEN:  in NAD, pleasant, hair loss, sitting in wheelchair with oxygen tank  NECK:  Supple without adenopathy. CV:  Regular rate and rhythm, S1 and S2 normal  PULM: Fairly good air movement decreased breath sounds right base  ABD: Soft, NT, no masses appreciated  EXT: No rash or edema  NEURO: Alert oriented ×3, sitting in wheelchair with oxygen on    ASSESSMENT/PLAN:  1. Prediabetes  Recheck labs  - CBC Auto Differential; Future  - Hemoglobin A1C; Future  - Lipid Panel; Future  - TSH with Reflex; Future    2. Mixed hyperlipidemia  Stable continue statin, recheck lab  - Comprehensive Metabolic Panel; Future    3. Neuroendocrine tumor  Seeing  oncology    4.

## 2020-10-20 ENCOUNTER — OFFICE VISIT (OUTPATIENT)
Dept: FAMILY MEDICINE CLINIC | Age: 80
End: 2020-10-20
Payer: MEDICARE

## 2020-10-20 VITALS
HEART RATE: 82 BPM | OXYGEN SATURATION: 99 % | TEMPERATURE: 97.2 F | HEIGHT: 60 IN | WEIGHT: 179 LBS | DIASTOLIC BLOOD PRESSURE: 76 MMHG | BODY MASS INDEX: 35.14 KG/M2 | SYSTOLIC BLOOD PRESSURE: 122 MMHG

## 2020-10-20 PROCEDURE — 99214 OFFICE O/P EST MOD 30 MIN: CPT | Performed by: FAMILY MEDICINE

## 2020-10-20 PROCEDURE — G0008 ADMIN INFLUENZA VIRUS VAC: HCPCS | Performed by: FAMILY MEDICINE

## 2020-10-20 PROCEDURE — 90694 VACC AIIV4 NO PRSRV 0.5ML IM: CPT | Performed by: FAMILY MEDICINE

## 2020-10-20 NOTE — PROGRESS NOTES
Vaccine Information Sheet, \"Influenza - Inactivated\"  given to Jose Armando Vogt, or parent/legal guardian of  Jose Armando Vogt and verbalized understanding. Patient responses:    Have you ever had a reaction to a flu vaccine? No  Do you have any current illness? No  Have you ever had [place] Syndrome? No  Do you have a serious allergy to any of the follow: Neomycin, Polymyxin, Thimerosal, eggs or egg products? No    Flu vaccine given per order. Please see immunization tab. Risks and benefits explained. Current VIS given.

## 2020-10-22 ENCOUNTER — TELEPHONE (OUTPATIENT)
Dept: FAMILY MEDICINE CLINIC | Age: 80
End: 2020-10-22

## 2020-10-22 LAB
ALBUMIN SERPL-MCNC: 2.9 G/DL (ref 3.5–5.7)
ALP BLD-CCNC: 90 IU/L (ref 35–135)
ALT SERPL-CCNC: 14 IU/L (ref 10–60)
ANION GAP SERPL CALCULATED.3IONS-SCNC: 7 MMOL/L
AST SERPL-CCNC: 33 IU/L (ref 10–40)
BASOPHILS ABSOLUTE: 0 THOU/MCL (ref 0–0.2)
BASOPHILS ABSOLUTE: 1 %
BILIRUB SERPL-MCNC: 0.8 MG/DL (ref 0–1.2)
BUN BLDV-MCNC: 23 MG/DL (ref 8–26)
CALCIUM SERPL-MCNC: 8.2 MG/DL (ref 8.5–10.4)
CHLORIDE BLD-SCNC: 100 MEQ/L (ref 98–111)
CHOLESTEROL, TOTAL: 164 MG/DL
CO2: 31 MMOL/L (ref 21–31)
CREAT SERPL-MCNC: 0.77 MG/DL (ref 0.6–1.2)
EOSINOPHILS ABSOLUTE: 0 THOU/MCL (ref 0.03–0.45)
EOSINOPHILS RELATIVE PERCENT: 0 %
ESTIMATED AVERAGE GLUCOSE: 94 MG/DL
GFR AFRICAN AMERICAN: 82 ML/MIN/1.73 M2
GFR NON-AFRICAN AMERICAN: 71 ML/MIN/1.73 M2
GLUCOSE BLD-MCNC: 109 MG/DL (ref 70–99)
HBA1C MFR BLD: 4.9 % (ref 4.2–5.6)
HCT VFR BLD CALC: 30.8 % (ref 36–46)
HDLC SERPL-MCNC: 57 MG/DL
HEMOGLOBIN: 10.3 G/DL (ref 12–15.2)
LDL CHOLESTEROL CALCULATED: 89 MG/DL
LYMPHOCYTES ABSOLUTE: 1.3 THOU/MCL (ref 1–4)
LYMPHOCYTES RELATIVE PERCENT: 22 %
MCH RBC QN AUTO: 31.8 PG (ref 27–33)
MCHC RBC AUTO-ENTMCNC: 33.4 G/DL (ref 32–36)
MCV RBC AUTO: 95 FL (ref 82–97)
MONOCYTES # BLD: 16 %
MONOCYTES ABSOLUTE: 0.9 THOU/MCL (ref 0.2–0.9)
NEUTROPHILS ABSOLUTE: 3.5 THOU/MCL (ref 1.8–7.7)
NONHDLC SERPL-MCNC: 107 MG/DL
PDW BLD-RTO: 15.4 % (ref 12.3–17)
PLATELET # BLD: 269 THOU/MCL (ref 140–375)
PMV BLD AUTO: 8.6 FL (ref 7.4–11.5)
POTASSIUM SERPL-SCNC: 3.6 MEQ/L (ref 3.6–5.1)
RBC # BLD: 3.24 MIL/MCL (ref 3.8–5.2)
SEG NEUTROPHILS: 61 %
SODIUM BLD-SCNC: 138 MEQ/L (ref 135–145)
TOTAL PROTEIN: 6 G/DL (ref 6–8)
TRIGL SERPL-MCNC: 89 MG/DL
TSH ULTRASENSITIVE: 3.06 MCIU/ML (ref 0.27–4.2)
WBC # BLD: 5.7 THOU/MCL (ref 3.6–10.5)

## 2020-10-22 NOTE — TELEPHONE ENCOUNTER
Patient is calling for lab results and informed of Dr. Yara Meek message that anemia is shown and recommendation of iron tablets and diet. She spoke with her oncologist who said that the anemia could be a result of chemo. She wants to know if she should still take the iron tablets.

## 2020-10-28 ENCOUNTER — TELEPHONE (OUTPATIENT)
Dept: FAMILY MEDICINE CLINIC | Age: 80
End: 2020-10-28

## 2020-12-24 RX ORDER — LISINOPRIL 40 MG/1
TABLET ORAL
Qty: 90 TABLET | Refills: 1 | Status: SHIPPED | OUTPATIENT
Start: 2020-12-24 | End: 2021-06-09

## 2020-12-24 RX ORDER — CHLORTHALIDONE 25 MG/1
TABLET ORAL
Qty: 90 TABLET | Refills: 1 | Status: SHIPPED | OUTPATIENT
Start: 2020-12-24 | End: 2021-06-09

## 2020-12-24 RX ORDER — SIMVASTATIN 20 MG
TABLET ORAL
Qty: 90 TABLET | Refills: 1 | Status: SHIPPED | OUTPATIENT
Start: 2020-12-24 | End: 2021-06-09

## 2020-12-24 NOTE — TELEPHONE ENCOUNTER
Medication:   Requested Prescriptions     Pending Prescriptions Disp Refills    chlorthalidone (HYGROTON) 25 MG tablet [Pharmacy Med Name: CHLORTHALIDONE 25 MG TABLET] 90 tablet 2     Sig: TAKE ONE TABLET BY MOUTH DAILY    simvastatin (ZOCOR) 20 MG tablet [Pharmacy Med Name: SIMVASTATIN 20 MG TABLET] 90 tablet 2     Sig: TAKE ONE TABLET BY MOUTH AT BEDTIME    lisinopril (PRINIVIL;ZESTRIL) 40 MG tablet [Pharmacy Med Name: LISINOPRIL 40 MG TABLET] 90 tablet 2     Sig: TAKE ONE TABLET BY MOUTH DAILY       Last Filled:  12/18/19     Patient Phone Number: [phone] (home) [phone] (work)    Last appt: 10/20/2020 HTN   Next appt: Visit date not found    Lab Results   Component Value Date     10/22/2020    K 3.6 10/22/2020     10/22/2020    CO2 31 10/22/2020    BUN 23 10/22/2020    CREATININE 0.77 10/22/2020    GLUCOSE 109 (H) 10/22/2020    CALCIUM 8.2 (L) 10/22/2020    PROT 6.0 10/22/2020    LABALBU 2.9 (L) 10/22/2020    BILITOT 0.8 10/22/2020    ALKPHOS 90 10/22/2020    AST 33 10/22/2020    ALT 14 10/22/2020    LABGLOM 71 10/22/2020    GFRAA 82 10/22/2020    AGRATIO 0.8 (L) 06/18/2020    GLOB 3.6 06/18/2020

## 2020-12-24 NOTE — TELEPHONE ENCOUNTER
Medication and Quantity requested:    metoprolol tartrate (LOPRESSOR) tablet 25 mg - qty 180      Last Visit  10/20/20 - Dr Rishi Rosario and phone number updated in [facility]:  Yes    Giselle Brown

## 2021-01-08 ENCOUNTER — OFFICE VISIT (OUTPATIENT)
Dept: ORTHOPEDIC SURGERY | Age: 81
End: 2021-01-08
Payer: MEDICARE

## 2021-01-08 VITALS — TEMPERATURE: 97.9 F | HEIGHT: 60 IN | BODY MASS INDEX: 35.34 KG/M2 | WEIGHT: 180 LBS

## 2021-01-08 DIAGNOSIS — M17.11 PRIMARY OSTEOARTHRITIS OF RIGHT KNEE: ICD-10-CM

## 2021-01-08 DIAGNOSIS — M17.12 PRIMARY OSTEOARTHRITIS OF LEFT KNEE: Primary | ICD-10-CM

## 2021-01-08 PROCEDURE — 20610 DRAIN/INJ JOINT/BURSA W/O US: CPT | Performed by: PHYSICIAN ASSISTANT

## 2021-01-08 RX ORDER — BETAMETHASONE SODIUM PHOSPHATE AND BETAMETHASONE ACETATE 3; 3 MG/ML; MG/ML
12 INJECTION, SUSPENSION INTRA-ARTICULAR; INTRALESIONAL; INTRAMUSCULAR; SOFT TISSUE ONCE
Status: COMPLETED | OUTPATIENT
Start: 2021-01-08 | End: 2021-01-08

## 2021-01-08 RX ORDER — LIDOCAINE HYDROCHLORIDE 10 MG/ML
5 INJECTION, SOLUTION INFILTRATION; PERINEURAL ONCE
Status: COMPLETED | OUTPATIENT
Start: 2021-01-08 | End: 2021-01-08

## 2021-01-08 RX ADMIN — BETAMETHASONE SODIUM PHOSPHATE AND BETAMETHASONE ACETATE 12 MG: 3; 3 INJECTION, SUSPENSION INTRA-ARTICULAR; INTRALESIONAL; INTRAMUSCULAR; SOFT TISSUE at 15:22

## 2021-01-08 RX ADMIN — BETAMETHASONE SODIUM PHOSPHATE AND BETAMETHASONE ACETATE 12 MG: 3; 3 INJECTION, SUSPENSION INTRA-ARTICULAR; INTRALESIONAL; INTRAMUSCULAR; SOFT TISSUE at 15:21

## 2021-01-08 RX ADMIN — LIDOCAINE HYDROCHLORIDE 5 ML: 10 INJECTION, SOLUTION INFILTRATION; PERINEURAL at 15:23

## 2021-01-08 NOTE — PROGRESS NOTES
Patient Name: Josue Thomas  Medical Record Number: [id number]  YOB: 1940  Date of Encounter: 1/8/2021     Chief Complaint   Patient presents with    Knee Pain     12 wk f/u b/l knee, had injection in sept, feels offered some relief, only walks at home       History of Present Illness:  Ms. Christal Nicholas is here in follow up regarding her chronic bilateral knee pain.  Patient receives cortisone injections about every 3 months.  She states the injections are still giving her some relief. She just recently started having increased pain and would like repeat injections.  She denies any recent injury. Keara Bermerry is worse with activity. Waqas Barnes is still using her rolling walker.     The patient's past medical history, medications, allergies, family history, social history, and review of systems have been reviewed, and dated and are recorded in the chart under the 'MEDIA\" tab. Physical Exam:    Ms. Josue Thomas appears well, she is in no apparent distress, she demonstrates appropriate mood & affect. She is alert and oriented to person, place and time. Temp 97.9 °F (36.6 °C)   Ht 5' (1.524 m)   Wt 180 lb (81.6 kg)   LMP 05/20/1991   BMI 35.15 kg/m²     On examination of patient's knees bilaterally there is very mild swelling.  She has tenderness on palpation of both the medial and lateral joint lines.  She lacks maybe a few degrees of extension bilaterally but has good flexion to 115 degrees.  She has 4/5 motor strength with flexion and extension. Orders:  Orders Placed This Encounter   Procedures    20610 - [place] DRAIN/INJECT LARGE JOINT/BURSA       Impression:   Diagnosis Orders   1.  Primary osteoarthritis of left knee  betamethasone acetate-betamethasone sodium phosphate (CELESTONE) injection 12 mg    lidocaine 1 % injection 5 mL    72502 - LA DRAIN/INJECT LARGE JOINT/BURSA 2. Primary osteoarthritis of right knee  betamethasone acetate-betamethasone sodium phosphate (CELESTONE) injection 12 mg    lidocaine 1 % injection 5 mL    57022 - [place] DRAIN/INJECT LARGE JOINT/BURSA       Injections:  After discussing the risks and benefits of cortisone injection including increased pain, drug reaction, infection, bleeding, blood glucose elevation, lack of improvement and neurovascular injury she agreed to receive one today. Questions were encouraged and answered. The correct patient, procedure, site and side were identified. Both knees were prepped with Betadine and 2 mL of betamethasone (12mg) mixed with 5 mL 1% lidocaine plain (50mg) were instilled with careful aspiration and injection under aseptic technique. The skin was then cleaned again with alcohol and a sterile adhesive dressing was applied. She tolerated this well and was instructed regarding post-injection care of icing and decreased activity as necessary.        Treatment Plan:     Patient presented today for repeat bilateral knee cortisone injections.  These were completed as recorded above in the procedure note.  She will modify activities to help control pain.  She will take precautions necessary to prevent falls.  She is given typical postinjection precautions.  She will follow-up as needed. Marsha Donald was informed of the results of any imaging. We discussed treatment options and a time was given to answer questions. A plan was proposed and Marsha Donald understand and accepts this course of care. Electronically signed by Stefan Tamez PA-C on 7/7/2290  Board Certified [facility]    Please note that portions of this note were completed with a voice recognition program.  Efforts were made to edit the dictations but occasionally words are mis-transcribed.

## 2021-02-25 DIAGNOSIS — I10 ESSENTIAL HYPERTENSION: ICD-10-CM

## 2021-02-25 PROBLEM — C34.91 SMALL CELL LUNG CANCER, RIGHT (HCC): Status: ACTIVE | Noted: 2021-02-25

## 2021-03-04 ASSESSMENT — PATIENT HEALTH QUESTIONNAIRE - PHQ9: SUM OF ALL RESPONSES TO PHQ QUESTIONS 1-9: 0

## 2021-03-04 ASSESSMENT — LIFESTYLE VARIABLES
HOW OFTEN DURING THE LAST YEAR HAVE YOU FAILED TO DO WHAT WAS NORMALLY EXPECTED FROM YOU BECAUSE OF DRINKING: NEVER
HOW OFTEN DO YOU HAVE A DRINK CONTAINING ALCOHOL: 1
HOW OFTEN DURING THE LAST YEAR HAVE YOU BEEN UNABLE TO REMEMBER WHAT HAPPENED THE NIGHT BEFORE BECAUSE YOU HAD BEEN DRINKING: NEVER
HOW OFTEN DO YOU HAVE SIX OR MORE DRINKS ON ONE OCCASION: NEVER
AUDIT-C TOTAL SCORE: 0
HOW OFTEN DO YOU HAVE SIX OR MORE DRINKS ON ONE OCCASION: 0
HOW MANY STANDARD DRINKS CONTAINING ALCOHOL DO YOU HAVE ON A TYPICAL DAY: ONE OR TWO
HAVE YOU OR SOMEONE ELSE BEEN INJURED AS A RESULT OF YOUR DRINKING: NO
HOW OFTEN DURING THE LAST YEAR HAVE YOU NEEDED AN ALCOHOLIC DRINK FIRST THING IN THE MORNING TO GET YOURSELF GOING AFTER A NIGHT OF HEAVY DRINKING: NEVER
HAS A RELATIVE, FRIEND, DOCTOR, OR ANOTHER HEALTH PROFESSIONAL EXPRESSED CONCERN ABOUT YOUR DRINKING OR SUGGESTED YOU CUT DOWN: NO
HOW OFTEN DURING THE LAST YEAR HAVE YOU FAILED TO DO WHAT WAS NORMALLY EXPECTED FROM YOU BECAUSE OF DRINKING: 0
HOW OFTEN DURING THE LAST YEAR HAVE YOU HAD A FEELING OF GUILT OR REMORSE AFTER DRINKING: NEVER
HOW OFTEN DURING THE LAST YEAR HAVE YOU FOUND THAT YOU WERE NOT ABLE TO STOP DRINKING ONCE YOU HAD STARTED: NEVER
AUDIT TOTAL SCORE: 0
AUDIT-C TOTAL SCORE: 1
AUDIT TOTAL SCORE: 1
HOW OFTEN DO YOU HAVE A DRINK CONTAINING ALCOHOL: MONTHLY OR LESS

## 2021-03-10 ENCOUNTER — OFFICE VISIT (OUTPATIENT)
Dept: FAMILY MEDICINE CLINIC | Age: 81
End: 2021-03-10
Payer: MEDICARE

## 2021-03-10 VITALS
HEIGHT: 60 IN | HEART RATE: 82 BPM | SYSTOLIC BLOOD PRESSURE: 134 MMHG | DIASTOLIC BLOOD PRESSURE: 74 MMHG | TEMPERATURE: 97.7 F | WEIGHT: 170.4 LBS | BODY MASS INDEX: 33.45 KG/M2 | OXYGEN SATURATION: 100 %

## 2021-03-10 DIAGNOSIS — Z00.00 MEDICARE ANNUAL WELLNESS VISIT, SUBSEQUENT: Primary | ICD-10-CM

## 2021-03-10 DIAGNOSIS — C34.91 SMALL CELL LUNG CANCER, RIGHT (HCC): ICD-10-CM

## 2021-03-10 DIAGNOSIS — D3A.8 NEUROENDOCRINE TUMOR: ICD-10-CM

## 2021-03-10 DIAGNOSIS — E78.2 MIXED HYPERLIPIDEMIA: ICD-10-CM

## 2021-03-10 DIAGNOSIS — I10 ESSENTIAL HYPERTENSION: ICD-10-CM

## 2021-03-10 DIAGNOSIS — L89.313 PRESSURE ULCER OF RIGHT BUTTOCK, STAGE 3 (HCC): ICD-10-CM

## 2021-03-10 DIAGNOSIS — D63.8 ANEMIA OF CHRONIC DISEASE: ICD-10-CM

## 2021-03-10 PROBLEM — E66.01 MORBID OBESITY WITH BMI OF 40.0-44.9, ADULT (HCC): Status: RESOLVED | Noted: 2018-05-09 | Resolved: 2021-03-10

## 2021-03-10 PROCEDURE — G0439 PPPS, SUBSEQ VISIT: HCPCS | Performed by: FAMILY MEDICINE

## 2021-03-10 RX ORDER — URSODIOL 300 MG/1
300 CAPSULE ORAL 2 TIMES DAILY
Status: ON HOLD | COMMUNITY
End: 2022-02-04

## 2021-03-10 RX ORDER — POTASSIUM CHLORIDE 20MEQ/15ML
LIQUID (ML) ORAL
Status: ON HOLD | COMMUNITY
Start: 2021-02-18 | End: 2022-02-06 | Stop reason: HOSPADM

## 2021-03-10 RX ORDER — MAGNESIUM OXIDE 400 MG/1
TABLET ORAL
Status: ON HOLD | COMMUNITY
Start: 2021-02-18 | End: 2022-02-06 | Stop reason: HOSPADM

## 2021-03-10 ASSESSMENT — PATIENT HEALTH QUESTIONNAIRE - PHQ9
SUM OF ALL RESPONSES TO PHQ9 QUESTIONS 1 & 2: 0
2. FEELING DOWN, DEPRESSED OR HOPELESS: 0
SUM OF ALL RESPONSES TO PHQ QUESTIONS 1-9: 0
SUM OF ALL RESPONSES TO PHQ QUESTIONS 1-9: 0

## 2021-03-10 ASSESSMENT — LIFESTYLE VARIABLES
HOW OFTEN DO YOU HAVE SIX OR MORE DRINKS ON ONE OCCASION: 0
HOW MANY STANDARD DRINKS CONTAINING ALCOHOL DO YOU HAVE ON A TYPICAL DAY: 0
AUDIT-C TOTAL SCORE: 0
HOW OFTEN DO YOU HAVE A DRINK CONTAINING ALCOHOL: 0

## 2021-03-10 NOTE — PATIENT INSTRUCTIONS
Personalized Preventive Plan for Misty Mittal - 3/10/2021  Medicare offers a range of preventive health benefits. Some of the tests and screenings are paid in full while other may be subject to a deductible, co-insurance, and/or copay. Some of these benefits include a comprehensive review of your medical history including lifestyle, illnesses that may run in your family, and various assessments and screenings as appropriate. After reviewing your medical record and screening and assessments performed today your provider may have ordered immunizations, labs, imaging, and/or referrals for you. A list of these orders (if applicable) as well as your Preventive Care list are included within your After Visit Summary for your review. Other Preventive Recommendations:    · A preventive eye exam performed by an eye specialist is recommended every 1-2 years to screen for glaucoma; cataracts, macular degeneration, and other eye disorders. · A preventive dental visit is recommended every 6 months. · Try to get at least 150 minutes of exercise per week or 10,000 steps per day on a pedometer . · Order or download the FREE \"Exercise & Physical Activity: Your Everyday Guide\" from [employer]. Call 9-647.479.8209 or search [employer] online. · You need 0463-3780 mg of calcium and 6853-5617 IU of vitamin D per day. It is possible to meet your calcium requirement with diet alone, but a vitamin D supplement is usually necessary to meet this goal.  · When exposed to the sun, use a sunscreen that protects against both UVA and UVB radiation with an SPF of 30 or greater. Reapply every 2 to 3 hours or after sweating, drying off with a towel, or swimming. · Always wear a seat belt when traveling in a car. Always wear a helmet when riding a bicycle or motorcycle.

## 2021-03-10 NOTE — PROGRESS NOTES
Medicare Annual Wellness Visit  Name: Sameera  Date: 3/10/2021   MRN: [id number] Sex: Female   Age: 79 y.o. Ethnicity: Non-/Non    : 1940 Race: Keli Elaine is here for Medicare AWV    Screenings for behavioral, psychosocial and functional/safety risks, and cognitive dysfunction are all negative except as indicated below. These results, as well as other patient data from the [address] form, are documented in Flowsheets linked to this Encounter. Labs 2020 reviewed with patient  Had labs through [facility] February and 2021/reviewed    Copy of [facility] given to us today    Famotidine as needed       Allergies   Allergen Reactions    Penicillins     Tetracyclines & Related Nausea Only         Prior to Visit Medications    Medication Sig Taking?  Authorizing Provider   potassium chloride 20 MEQ/15ML (10%) oral solution TAKE 15MLS BY MOUTH DAILY Yes Historical Provider, MD   magnesium oxide (MAG-OX) 400 MG tablet TAKE ONE TABLET BY MOUTH DAILY Yes Historical Provider, MD   ursodiol (ACTIGALL) 300 MG capsule Take 300 mg by mouth 2 times daily Yes Historical Provider, MD   verapamil (CALAN SR) 180 MG extended release tablet TAKE ONE TABLET BY MOUTH TWICE A DAY Yes Monica Pabon MD   chlorthalidone (HYGROTON) 25 MG tablet TAKE ONE TABLET BY MOUTH DAILY Yes Monica Pabon MD   simvastatin (ZOCOR) 20 MG tablet TAKE ONE TABLET BY MOUTH AT BEDTIME Yes Monica Pabon MD   lisinopril (PRINIVIL;ZESTRIL) 40 MG tablet TAKE ONE TABLET BY MOUTH DAILY Yes Monica Pabon MD   metoprolol tartrate (LOPRESSOR) 25 MG tablet TAKE ONE TABLET BY MOUTH TWICE A DAY Yes Monica Pabon MD   famotidine (PEPCID) 20 MG tablet Delia Foster Yes Monica Pabon MD   potassium chloride (KLOR-CON M) 20 MEQ extended release tablet TAKE ONE TABLET BY MOUTH DAILY Yes Monica Pabon MD   Cholecalciferol (VITAMIN D3) 2000 UNITS CAPS Take by mouth Yes Historical Provider, MD   Ascorbic Acid (VITAMIN C WITH AZRA HIPS) 500 MG tablet Take 500 mg by mouth daily. Yes Historical Provider, MD   Multiple Vitamins-Minerals (RA VISION-DEBBY PRESERVE PO) Take  by mouth.  Yes Historical Provider, MD   fluticasone (FLONASE) 50 MCG/ACT nasal spray 2 sprays by Nasal route daily  Patient not taking: Reported on 3/10/2021  Matthew Whitmore MD   Bioflavonoid Products (BIOFLEX PO) Take by mouth  Historical Provider, MD         Past Medical History:   Diagnosis Date    Allergic     Anemia of chronic disease 3/10/2021    Arthritis     Cancer ([address]     Hyperlipidemia     Hypertension     Obesity     Pre-diabetes     S/P colonoscopy 1990       Past Surgical History:   Procedure Laterality Date    APPENDECTOMY      BRONCHOSCOPY N/A 6/18/2020    BRONCHOSCOPY ALVEOLAR LAVAGE performed by Pavan Bobo MD at [address] [place] [address] 6/18/2020    BRONCHOSCOPY BRUSHINGS performed by Pavan Bobo MD at [address] [place] [address] 6/18/2020    BRONCHOSCOPY BIOPSY BRONCHUS performed by Pavan Bobo MD at [address] [place] [address]  6/18/2020    BRONCHOSCOPY W/EBUS FNA performed by Pavan Bobo MD at [address] [place] [address] N/A 6/18/2020    BRONCHOSCOPY ENDOBRONCHIAL ULTRASOUND performed by Pavan Bobo MD at [address] [facility] Bilateral     TONSILLECTOMY           Family History   Problem Relation Age of Onset    Heart Disease Mother        CareTeam (Including outside providers/suppliers regularly involved in providing care):   Patient Care Team:  Matthew Whitmore MD as PCP - General (Family Medicine)  Matthew Whitmore MD as PCP - [facility] Empaneled Provider    Wt Readings from Last 3 Encounters:   03/10/21 170 lb 6.4 oz (77.3 kg)   01/08/21 180 lb (81.6 kg)   10/20/20 179 lb (81.2 kg)     Vitals:    03/10/21 0939   BP: 134/74   Pulse: 82   Temp: 97.7 °F (36.5 °C)   SpO2: 100%   Weight: 170 lb 6.4 oz (77.3 kg)   Height: 5' (1.524 m)     Body mass index is 33.28 kg/m². Based upon direct observation of the patient, evaluation of cognition reveals recent and remote memory intact. General Appearance: alert and oriented to person, place and time, sitting  in a wheelchair, oxygen on  Skin: warm and dry, no rash or erythema  Head: normocephalic and atraumatic  Eyes: pupils equal, round, and reactive to light, extraocular eye movements intact, conjunctivae normal, bilateral cataracts  ENT: tympanic membrane, external ear and ear canal normal bilaterally, nose and mouth not examined due to Covid/mask on  Neck: supple and non-tender without mass, no thyromegaly or thyroid nodules, no cervical lymphadenopathy  Pulmonary/Chest: clear to auscultation bilaterally- no wheezes, rales or rhonchi, normal air movement, no respiratory distress  Cardiovascular: normal rate, regular rhythm, normal S1 and S2, no murmurs, rubs, clicks, or gallops, distal pulses intact, no carotid bruits  Abdomen: soft, non-tender, non-distended, normal bowel sounds, no masses or organomegaly  Breast: appear normal, no suspicious masses, no skin or nipple changes or axillary nodes  Skin: Diffuse seborrheic keratoses chest abdomen and back  Extremities: no cyanosis, clubbing or edema  Musculoskeletal: In wheelchair, offered physical therapy  Neurologic: Sitting in wheelchair, weak from chemo, physical therapy at some point in the future    Patient's complete Health Risk Assessment and screening values have been reviewed and are found in Flowsheets. The following problems were reviewed today and where indicated follow up appointments were made and/or referrals ordered.     Positive Risk Factor Screenings with Interventions:     Fall Risk:  2 or more falls in past year?: (!) yes  Fall with injury in past year?: no          General Health and ACP:  General  In general, how would you say your health is?: Fair  In the past 7 days, have you experienced any of the following? New or Increased Pain, New or Increased Fatigue, Loneliness, Social Isolation, Stress or Anger?: None of These  Do you get the social and emotional support that you need?: Yes  Do you have a Living Will?: Yes  Advance Directives     Power of  Living Will ACP-Advance Directive ACP-Power of Barbie Campbell on 03/10/21 Not on File Not on File Filed      General Health Risk Interventions:  · info= given     Health Habits/Nutrition:  Health Habits/Nutrition  Do you exercise for at least 20 minutes 2-3 times per week?: (!) No  Have you lost any weight without trying in the past 3 months?: (!) Yes  Do you eat only one meal per day?: No  Have you seen the dentist within the past year?: Yes  Body mass index: (!) 33.28  Health Habits/Nutrition Interventions:  · Spends most of the day in the wheelchair/sitting due to generalized weakness from chemotherapy    Hearing/Vision:  No exam data present  Hearing/Vision  Do you or your family notice any trouble with your hearing that hasn't been managed with hearing aids?: No  Do you have difficulty driving, watching TV, or doing any of your daily activities because of your eyesight?: (!) Yes  Have you had an eye exam within the past year?: Yes  Hearing/Vision Interventions:  · wears glasses/ cataracts      ADL:  ADLs  In the past 7 days, did you need help from others to perform any of the following everyday activities? Eating, dressing, grooming, bathing, toileting, or walking/balance?: None  In the past 7 days, did you need help from others to take care of any of the following?  Laundry, housekeeping, banking/finances, shopping, telephone use, food preparation, transportation, or taking medications?: [employer], Shopping, Transportation  ADL Interventions:  · Patient declines any further evaluation/treatment for this issue    Personalized Preventive Plan   Current Health Maintenance Status  Immunization History   Administered Date(s) Administered    COVID-19, Gerard Mata, PF, 30mcg/0.3mL 01/31/2021, 02/22/2021    Influenza, High Dose (Fluzone 65 yrs and older) 12/31/2014, 11/11/2015, 10/24/2016, 09/06/2017, 12/17/2018    Influenza, Intradermal, Preservative free 10/11/2013    Influenza, Quadv, adjuvanted, 65 yrs +, IM, PF (Fluad) 10/20/2020    Influenza, Triv, inactivated, subunit, adjuvanted, IM (Fluad 65 yrs and older) 12/18/2019    Pneumococcal Conjugate 13-valent (Ijuicen33) 12/31/2014    Pneumococcal Polysaccharide (Vdsggneei03) 08/29/2016, 10/24/2016    Tdap (Boostrix, Adacel) 08/10/2015    Zoster Live (Zostavax) 05/24/2011        Health Maintenance   Topic Date Due    Shingles Vaccine (2 of 3) 07/19/2011    Breast cancer screen  05/08/2019    Annual Wellness Visit (AWV)  Never done    Lipid screen  10/22/2021    Potassium monitoring  10/22/2021    Creatinine monitoring  10/22/2021    DTaP/Tdap/Td vaccine (2 - Td) 08/10/2025    DEXA (modify frequency per FRAX score)  Completed    Flu vaccine  Completed    Pneumococcal 65+ years Vaccine  Completed    COVID-19 Vaccine  Completed    Hepatitis A vaccine  Aged Out    Hepatitis B vaccine  Aged Out    Hib vaccine  Aged Out    Meningococcal (ACWY) vaccine  Aged Out     Recommendations for [employer] Due: see orders and patient instructions/AVS.  . Recommended screening schedule for the next 5-10 years is provided to the patient in written form: see Patient Kinjal Quiroz was seen today for medicare awv.     Diagnoses and all orders for this visit:    Medicare annual wellness visit, subsequent    Stable, continue meds  Mammogram in the future  Physical therapy in the future  Has had both Covid vaccines  Recommend shingles vaccine this summer    Small cell lung cancer, right ([address]    Stable, seeing oncology    Neuroendocrine tumor  Stable, seeing oncology    Essential hypertension  Stable continue medication    Pressure ulcer of right buttock, stage 3 ([address]

## 2021-03-31 ENCOUNTER — OFFICE VISIT (OUTPATIENT)
Dept: ORTHOPEDIC SURGERY | Age: 81
End: 2021-03-31
Payer: MEDICARE

## 2021-03-31 VITALS — TEMPERATURE: 97.2 F | HEIGHT: 60 IN | WEIGHT: 170 LBS | BODY MASS INDEX: 33.38 KG/M2

## 2021-03-31 DIAGNOSIS — M17.0 PRIMARY OSTEOARTHRITIS OF BOTH KNEES: Primary | ICD-10-CM

## 2021-03-31 PROCEDURE — 20610 DRAIN/INJ JOINT/BURSA W/O US: CPT | Performed by: PHYSICIAN ASSISTANT

## 2021-03-31 RX ORDER — LIDOCAINE HYDROCHLORIDE 10 MG/ML
5 INJECTION, SOLUTION INFILTRATION; PERINEURAL ONCE
Status: COMPLETED | OUTPATIENT
Start: 2021-03-31 | End: 2021-03-31

## 2021-03-31 RX ORDER — BETAMETHASONE SODIUM PHOSPHATE AND BETAMETHASONE ACETATE 3; 3 MG/ML; MG/ML
12 INJECTION, SUSPENSION INTRA-ARTICULAR; INTRALESIONAL; INTRAMUSCULAR; SOFT TISSUE ONCE
Status: COMPLETED | OUTPATIENT
Start: 2021-03-31 | End: 2021-03-31

## 2021-03-31 RX ADMIN — BETAMETHASONE SODIUM PHOSPHATE AND BETAMETHASONE ACETATE 12 MG: 3; 3 INJECTION, SUSPENSION INTRA-ARTICULAR; INTRALESIONAL; INTRAMUSCULAR; SOFT TISSUE at 15:33

## 2021-03-31 RX ADMIN — LIDOCAINE HYDROCHLORIDE 5 ML: 10 INJECTION, SOLUTION INFILTRATION; PERINEURAL at 15:34

## 2021-03-31 NOTE — PROGRESS NOTES
Patient Name: Al Owens  Medical Record Number: [id number]  YOB: 1940  Date of Encounter: 3/31/2021     Chief Complaint   Patient presents with    Knee Pain     f/u Bilateral knees. B cortisones on 1/8/2021        History of Present Illness:  Ms. Christal Nicholas is here in follow up regarding her chronic bilateral knee pain.  Patient receives cortisone injections about every 3 months.  She states the injections are still giving her some relief. She just recently started having increased pain and would like repeat injections.  She denies any recent injury.  Pain is worse with activity. The patient's past medical history, medications, allergies, family history, social history, and review of systems have been reviewed, and dated and are recorded in the chart under the 'MEDIA\" tab. Physical Exam:    Ms. Al Owens appears well, she is in no apparent distress, she demonstrates appropriate mood & affect. She is alert and oriented to person, place and time. Temp 97.2 °F (36.2 °C)   Ht 5' (1.524 m)   Wt 170 lb (77.1 kg)   LMP 05/20/1991   BMI 33.20 kg/m²     On examination of patient's knees bilaterally there is very mild swelling.  She has tenderness on palpation of both the medial and lateral joint lines.  She lacks maybe a few degrees of extension bilaterally but has good flexion to 115 degrees.  She has 4/5 motor strength with flexion and extension. Orders:  Orders Placed This Encounter   Procedures    AL ARTHROCENTESIS ASPIR&/INJ MAJOR JT/BURSA W/O US       Impression:   Diagnosis Orders   1.  Primary osteoarthritis of both knees  betamethasone acetate-betamethasone sodium phosphate (CELESTONE) injection 12 mg    betamethasone acetate-betamethasone sodium phosphate (CELESTONE) injection 12 mg    lidocaine 1 % injection 5 mL    lidocaine 1 % injection 5 mL    AL ARTHROCENTESIS ASPIR&/INJ MAJOR JT/BURSA W/O US       Injections:  After discussing the risks and benefits of cortisone injection including increased pain, drug reaction, infection, bleeding, blood glucose elevation, lack of improvement and neurovascular injury she agreed to receive one today. Questions were encouraged and answered. The correct patient, procedure, site and side were identified. Both knees were prepped with Betadine and 2 mL of betamethasone (12mg) mixed with 5 mL 1% lidocaine plain (50mg) were instilled with careful aspiration and injection under aseptic technique. The skin was then cleaned again with alcohol and a sterile adhesive dressing was applied. She tolerated this well and was instructed regarding post-injection care of icing and decreased activity as necessary.        Treatment Plan:     Patient presented today for repeat bilateral knee cortisone injections.  These were completed as recorded above in the procedure note.  She will modify activities to help control pain.  She will take precautions necessary to prevent falls.  She is given typical postinjection precautions.  She will follow-up as needed. Daxa Díaz was informed of the results of any imaging. We discussed treatment options and a time was given to answer questions. A plan was proposed and Daxa Díaz understand and accepts this course of care. Electronically signed by Shivani Billings PA-C on 1/93/1810  Board Certified [facility]    Please note that portions of this note were completed with a voice recognition program.  Efforts were made to edit the dictations but occasionally words are mis-transcribed.

## 2021-06-09 DIAGNOSIS — E78.5 HYPERLIPIDEMIA, UNSPECIFIED HYPERLIPIDEMIA TYPE: ICD-10-CM

## 2021-06-09 RX ORDER — SIMVASTATIN 20 MG
TABLET ORAL
Qty: 90 TABLET | Refills: 2 | Status: SHIPPED | OUTPATIENT
Start: 2021-06-09

## 2021-06-09 RX ORDER — CHLORTHALIDONE 25 MG/1
TABLET ORAL
Qty: 90 TABLET | Refills: 2 | Status: ON HOLD | OUTPATIENT
Start: 2021-06-09 | End: 2022-02-04

## 2021-06-09 RX ORDER — LISINOPRIL 40 MG/1
TABLET ORAL
Qty: 90 TABLET | Refills: 2 | Status: ON HOLD
Start: 2021-06-09 | End: 2022-02-06 | Stop reason: HOSPADM

## 2021-06-09 NOTE — TELEPHONE ENCOUNTER
Medication:   Requested Prescriptions     Pending Prescriptions Disp Refills    chlorthalidone (HYGROTON) 25 MG tablet [Pharmacy Med Name: CHLORTHALIDONE 25 MG TABLET] 90 tablet 0     Sig: TAKE ONE TABLET BY MOUTH DAILY    lisinopril (PRINIVIL;ZESTRIL) 40 MG tablet [Pharmacy Med Name: LISINOPRIL 40 MG TABLET] 90 tablet 0     Sig: TAKE ONE TABLET BY MOUTH DAILY    simvastatin (ZOCOR) 20 MG tablet [Pharmacy Med Name: SIMVASTATIN 20 MG TABLET] 90 tablet 0     Sig: TAKE ONE TABLET BY MOUTH AT BEDTIME       Last Filled:  Zocor 12/24/2020 #90 1rf  Lisinopril 12/24/2020 #90 1rf  Chlorthalidone 12/24/2020 #90 1rf    Patient Phone Number: [phone] (home) [phone] (work)    Last appt: 3/10/2021   Next appt: Visit date not found    Lab Results   Component Value Date     10/22/2020    K 3.6 10/22/2020     10/22/2020    CO2 31 10/22/2020    BUN 23 10/22/2020    CREATININE 0.77 10/22/2020    GLUCOSE 109 (H) 10/22/2020    CALCIUM 8.2 (L) 10/22/2020    PROT 6.0 10/22/2020    LABALBU 2.9 (L) 10/22/2020    BILITOT 0.8 10/22/2020    ALKPHOS 90 10/22/2020    AST 33 10/22/2020    ALT 14 10/22/2020    LABGLOM 71 10/22/2020    GFRAA 82 10/22/2020    AGRATIO 0.8 (L) 06/18/2020    GLOB 3.6 06/18/2020

## 2021-07-01 ENCOUNTER — OFFICE VISIT (OUTPATIENT)
Dept: ORTHOPEDIC SURGERY | Age: 81
End: 2021-07-01
Payer: MEDICARE

## 2021-07-01 VITALS — BODY MASS INDEX: 34.83 KG/M2 | WEIGHT: 177.4 LBS | HEIGHT: 60 IN

## 2021-07-01 DIAGNOSIS — M17.0 PRIMARY OSTEOARTHRITIS OF BOTH KNEES: Primary | ICD-10-CM

## 2021-07-01 PROCEDURE — 20610 DRAIN/INJ JOINT/BURSA W/O US: CPT | Performed by: PHYSICIAN ASSISTANT

## 2021-07-01 RX ORDER — BETAMETHASONE SODIUM PHOSPHATE AND BETAMETHASONE ACETATE 3; 3 MG/ML; MG/ML
12 INJECTION, SUSPENSION INTRA-ARTICULAR; INTRALESIONAL; INTRAMUSCULAR; SOFT TISSUE ONCE
Status: COMPLETED | OUTPATIENT
Start: 2021-07-01 | End: 2021-07-01

## 2021-07-01 RX ORDER — LIDOCAINE HYDROCHLORIDE 10 MG/ML
5 INJECTION, SOLUTION INFILTRATION; PERINEURAL ONCE
Status: COMPLETED | OUTPATIENT
Start: 2021-07-01 | End: 2021-07-01

## 2021-07-01 RX ADMIN — LIDOCAINE HYDROCHLORIDE 5 ML: 10 INJECTION, SOLUTION INFILTRATION; PERINEURAL at 10:09

## 2021-07-01 RX ADMIN — BETAMETHASONE SODIUM PHOSPHATE AND BETAMETHASONE ACETATE 12 MG: 3; 3 INJECTION, SUSPENSION INTRA-ARTICULAR; INTRALESIONAL; INTRAMUSCULAR; SOFT TISSUE at 10:09

## 2021-07-01 RX ADMIN — BETAMETHASONE SODIUM PHOSPHATE AND BETAMETHASONE ACETATE 12 MG: 3; 3 INJECTION, SUSPENSION INTRA-ARTICULAR; INTRALESIONAL; INTRAMUSCULAR; SOFT TISSUE at 10:10

## 2021-07-01 NOTE — PROGRESS NOTES
Patient Name: Nicole Waite  Medical Record Number: [id number]  YOB: 1940  Date of Encounter: 7/1/2021     Chief Complaint   Patient presents with    Follow-up     Bilateral knees,would like repeat cortisone today. History of Present Illness:  Ms. Christal Nicholas is here in follow up regarding her chronic bilateral knee pain.  Patient receives cortisone injections about every 3 months.  She states the injections are still giving her some relief. She just recently started having increased pain and would like repeat injections.  She denies any recent injury.  Pain is worse with activity. The patient's past medical history, medications, allergies, family history, social history, and review of systems have been reviewed, and dated and are recorded in the chart under the 'MEDIA\" tab. Physical Exam:    Ms. Nicole Waite appears well, she is in no apparent distress, she demonstrates appropriate mood & affect. She is alert and oriented to person, place and time. Ht 5' (1.524 m)   Wt 177 lb 6.4 oz (80.5 kg)   LMP 05/20/1991   BMI 34.65 kg/m²     On examination of patient's knees bilaterally there is very mild swelling.  She has tenderness on palpation of both the medial and lateral joint lines.  She lacks maybe a few degrees of extension bilaterally but has good flexion to 115 degrees.  She has 4/5 motor strength with flexion and extension      Orders:  Orders Placed This Encounter   Procedures    IA ARTHROCENTESIS ASPIR&/INJ MAJOR JT/BURSA W/O US       Impression:   Diagnosis Orders   1.  Primary osteoarthritis of both knees  betamethasone acetate-betamethasone sodium phosphate (CELESTONE) injection 12 mg    betamethasone acetate-betamethasone sodium phosphate (CELESTONE) injection 12 mg    lidocaine 1 % injection 5 mL    lidocaine 1 % injection 5 mL    IA ARTHROCENTESIS ASPIR&/INJ MAJOR JT/BURSA W/O US          Injections:  After discussing the risks and benefits of cortisone injection including increased pain, drug reaction, infection, bleeding, blood glucose elevation, lack of improvement and neurovascular injury she agreed to receive one today. Questions were encouraged and answered. The correct patient, procedure, site and side were identified. Both knees were prepped with Betadine and 2 mL of betamethasone (12mg) mixed with 5 mL 1% lidocaine plain (50mg) were instilled with careful aspiration and injection under aseptic technique. The skin was then cleaned again with alcohol and a sterile adhesive dressing was applied. She tolerated this well and was instructed regarding post-injection care of icing and decreased activity as necessary.        Treatment Plan:     Patient presented today for repeat bilateral knee cortisone injections.  These were completed as recorded above in the procedure note.  She will modify activities to help control pain.  She will take precautions necessary to prevent falls.  She is given typical postinjection precautions.  She will follow-up as needed. Taylor Aaron was informed of the results of any imaging. We discussed treatment options and a time was given to answer questions. A plan was proposed and Taylor Aaron understand and accepts this course of care. Electronically signed by Rahul Houston PA-C on 4/3/1345  Board Certified [facility]    Please note that portions of this note were completed with a voice recognition program.  Efforts were made to edit the dictations but occasionally words are mis-transcribed.

## 2021-09-16 RX ORDER — FAMOTIDINE 20 MG/1
TABLET, FILM COATED ORAL
Qty: 180 TABLET | Refills: 2 | Status: ON HOLD | OUTPATIENT
Start: 2021-09-16 | End: 2022-02-04

## 2021-09-16 NOTE — TELEPHONE ENCOUNTER
Medication:   Requested Prescriptions     Pending Prescriptions Disp Refills    famotidine (PEPCID) 20 MG tablet [Pharmacy Med Name: FAMOTIDINE 20 MG TABLET] 180 tablet 2     Sig: TAKE ONE TABLET BY MOUTH TWICE A DAY        Last Filled:  09/14/2020    Patient Phone Number: [phone] (home) [phone] (work)    Last appt: 3/10/2021   Next appt: Visit date not found    Last OARRS: No flowsheet data found.

## 2021-09-30 ENCOUNTER — OFFICE VISIT (OUTPATIENT)
Dept: ORTHOPEDIC SURGERY | Age: 81
End: 2021-09-30
Payer: MEDICARE

## 2021-09-30 VITALS — HEIGHT: 60 IN | WEIGHT: 177 LBS | BODY MASS INDEX: 34.75 KG/M2

## 2021-09-30 DIAGNOSIS — M17.11 PRIMARY OSTEOARTHRITIS OF RIGHT KNEE: ICD-10-CM

## 2021-09-30 DIAGNOSIS — M17.12 PRIMARY OSTEOARTHRITIS OF LEFT KNEE: Primary | ICD-10-CM

## 2021-09-30 PROCEDURE — 20610 DRAIN/INJ JOINT/BURSA W/O US: CPT | Performed by: PHYSICIAN ASSISTANT

## 2021-09-30 RX ORDER — BETAMETHASONE SODIUM PHOSPHATE AND BETAMETHASONE ACETATE 3; 3 MG/ML; MG/ML
12 INJECTION, SUSPENSION INTRA-ARTICULAR; INTRALESIONAL; INTRAMUSCULAR; SOFT TISSUE ONCE
Status: COMPLETED | OUTPATIENT
Start: 2021-09-30 | End: 2021-09-30

## 2021-09-30 RX ORDER — LIDOCAINE HYDROCHLORIDE 10 MG/ML
5 INJECTION, SOLUTION INFILTRATION; PERINEURAL ONCE
Status: COMPLETED | OUTPATIENT
Start: 2021-09-30 | End: 2021-09-30

## 2021-09-30 RX ADMIN — LIDOCAINE HYDROCHLORIDE 5 ML: 10 INJECTION, SOLUTION INFILTRATION; PERINEURAL at 10:26

## 2021-09-30 RX ADMIN — LIDOCAINE HYDROCHLORIDE 5 ML: 10 INJECTION, SOLUTION INFILTRATION; PERINEURAL at 10:27

## 2021-09-30 RX ADMIN — BETAMETHASONE SODIUM PHOSPHATE AND BETAMETHASONE ACETATE 12 MG: 3; 3 INJECTION, SUSPENSION INTRA-ARTICULAR; INTRALESIONAL; INTRAMUSCULAR; SOFT TISSUE at 10:26

## 2021-09-30 RX ADMIN — BETAMETHASONE SODIUM PHOSPHATE AND BETAMETHASONE ACETATE 12 MG: 3; 3 INJECTION, SUSPENSION INTRA-ARTICULAR; INTRALESIONAL; INTRAMUSCULAR; SOFT TISSUE at 10:25

## 2021-09-30 NOTE — PROGRESS NOTES
Patient Name: Kurt Keen  Medical Record Number: [id number]  [place]: 1940  Date of Encounter: 9/30/2021     Chief Complaint   Patient presents with    Follow-up     bilateral knee; wants injections 07.01.2021       History of Present Illness:   Ms. Kurt Keen is here in follow up regarding her chronic bilateral knee pain.  Patient receives cortisone injections about every 3 months.  She states the injections are still giving her some relief. She just recently started having increased pain and would like repeat injections.  She denies any recent injury. Tiffani Randall is worse with activity.     The patient's past medical history, medications, allergies, family history, social history, and review of systems have been reviewed, and dated and are recorded in the chart under the 'MEDIA\" tab. Physical Exam:    Ms. Kurt Keen appears well, she is in no apparent distress, she demonstrates appropriate mood & affect. She is alert and oriented to person, place and time. Ht 5' (1.524 m)   Wt 177 lb (80.3 kg)   LMP 05/20/1991   BMI 34.57 kg/m²     On examination of patient's knees bilaterally there is very mild swelling.  She has tenderness on palpation of both the medial and lateral joint lines.  She lacks maybe a few degrees of extension bilaterally but has good flexion to 115 degrees.  She has 4/5 motor strength with flexion and extension        Impression:   Diagnosis Orders   1. Primary osteoarthritis of left knee  betamethasone acetate-betamethasone sodium phosphate (CELESTONE) injection 12 mg    lidocaine 1 % injection 5 mL    CO ARTHROCENTESIS ASPIR&/INJ MAJOR JT/BURSA W/O US   2.  Primary osteoarthritis of right knee  betamethasone acetate-betamethasone sodium phosphate (CELESTONE) injection 12 mg    lidocaine 1 % injection 5 mL    CO ARTHROCENTESIS ASPIR&/INJ MAJOR JT/BURSA W/O US       Injections:  After discussing the risks and benefits of cortisone injection including increased pain, drug reaction, infection, bleeding, blood glucose elevation, lack of improvement and neurovascular injury she agreed to receive one today. Questions were encouraged and answered. The correct patient, procedure, site and side were identified. Both knees were prepped with Betadine and 2 mL of betamethasone (12mg) mixed with 5 mL 1% lidocaine plain (50mg) were instilled with careful aspiration and injection under aseptic technique. The skin was then cleaned again with alcohol and a sterile adhesive dressing was applied. She tolerated this well and was instructed regarding post-injection care of icing and decreased activity as necessary.        Treatment Plan:     Patient presented today for repeat bilateral knee cortisone injections.  These were completed as recorded above in the procedure note.  She will modify activities to help control pain.  She will take precautions necessary to prevent falls.  She is given typical postinjection precautions.  She will follow-up as needed. Patricia Negrete was informed of the results of any imaging. We discussed treatment options and a time was given to answer questions. A plan was proposed and Patricia Negrete understand and accepts this course of care. Electronically signed by Steve Thornton PA-C on 0/76/5078  Board Certified [facility]    Please note that portions of this note were completed with a voice recognition program.  Efforts were made to edit the dictations but occasionally words are mis-transcribed.

## 2021-10-07 ENCOUNTER — IMMUNIZATION (OUTPATIENT)
Dept: FAMILY MEDICINE CLINIC | Age: 81
End: 2021-10-07
Payer: MEDICARE

## 2021-10-07 DIAGNOSIS — Z23 NEED FOR VACCINATION: Primary | ICD-10-CM

## 2021-10-07 PROCEDURE — G0008 ADMIN INFLUENZA VIRUS VAC: HCPCS | Performed by: FAMILY MEDICINE

## 2021-10-07 PROCEDURE — 90694 VACC AIIV4 NO PRSRV 0.5ML IM: CPT | Performed by: FAMILY MEDICINE

## 2022-02-03 ENCOUNTER — APPOINTMENT (OUTPATIENT)
Dept: CT IMAGING | Age: 82
End: 2022-02-03
Payer: MEDICARE

## 2022-02-03 ENCOUNTER — HOSPITAL ENCOUNTER (EMERGENCY)
Age: 82
Discharge: ANOTHER HOSPITAL | End: 2022-02-04
Attending: EMERGENCY MEDICINE
Payer: MEDICARE

## 2022-02-03 DIAGNOSIS — S12.100A CLOSED ODONTOID FRACTURE, INITIAL ENCOUNTER (HCC): ICD-10-CM

## 2022-02-03 DIAGNOSIS — A41.9 SEPTICEMIA (HCC): ICD-10-CM
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
## 2022-02-03 LAB
A/G RATIO: 0.8 (ref 1.1–2.2)
ABO/RH: NORMAL
ALBUMIN SERPL-MCNC: 2.6 G/DL (ref 3.4–5)
ALP BLD-CCNC: 417 U/L (ref 40–129)
ALT SERPL-CCNC: 200 U/L (ref 10–40)
ANION GAP SERPL CALCULATED.3IONS-SCNC: 19 MMOL/L (ref 3–16)
ANTIBODY SCREEN: NORMAL
AST SERPL-CCNC: 506 U/L (ref 15–37)
BASE EXCESS VENOUS: 1 MMOL/L (ref -3–3)
BASOPHILS ABSOLUTE: 0.1 K/UL (ref 0–0.2)
BASOPHILS RELATIVE PERCENT: 0.4 %
BILIRUB SERPL-MCNC: 1.2 MG/DL (ref 0–1)
BUN BLDV-MCNC: 29 MG/DL (ref 7–20)
CALCIUM SERPL-MCNC: 8.6 MG/DL (ref 8.3–10.6)
CARBOXYHEMOGLOBIN: 4.2 % (ref 0–1.5)
CHLORIDE BLD-SCNC: 90 MMOL/L (ref 99–110)
CO2: 22 MMOL/L (ref 21–32)
CREAT SERPL-MCNC: 0.9 MG/DL (ref 0.6–1.2)
EOSINOPHILS ABSOLUTE: 0 K/UL (ref 0–0.6)
EOSINOPHILS RELATIVE PERCENT: 0 %
GFR AFRICAN AMERICAN: >60
GFR NON-AFRICAN AMERICAN: 60
GLUCOSE BLD-MCNC: 102 MG/DL (ref 70–99)
HCO3 VENOUS: 25.4 MMOL/L (ref 23–29)
HCT VFR BLD CALC: 33.8 % (ref 36–48)
HEMOGLOBIN, VEN, REDUCED: 6 %
HEMOGLOBIN: 10.8 G/DL (ref 12–16)
INR BLD: 1.9 (ref 0.88–1.12)
LACTIC ACID, SEPSIS: 4.3 MMOL/L (ref 0.4–1.9)
LACTIC ACID, SEPSIS: 7.1 MMOL/L (ref 0.4–1.9)
LYMPHOCYTES ABSOLUTE: 0.2 K/UL (ref 1–5.1)
LYMPHOCYTES RELATIVE PERCENT: 1.1 %
MCH RBC QN AUTO: 29.7 PG (ref 26–34)
MCHC RBC AUTO-ENTMCNC: 32 G/DL (ref 31–36)
MCV RBC AUTO: 92.8 FL (ref 80–100)
METHEMOGLOBIN VENOUS: 0 %
MONOCYTES ABSOLUTE: 0.3 K/UL (ref 0–1.3)
MONOCYTES RELATIVE PERCENT: 1.7 %
NEUTROPHILS ABSOLUTE: 19.8 K/UL (ref 1.7–7.7)
NEUTROPHILS RELATIVE PERCENT: 96.8 %
O2 CONTENT, VEN: 14 VOL %
O2 SAT, VEN: 94 %
O2 THERAPY: ABNORMAL
PCO2, VEN: 39 MMHG (ref 40–50)
PDW BLD-RTO: 19.8 % (ref 12.4–15.4)
PH VENOUS: 7.42 (ref 7.35–7.45)
PLATELET # BLD: 242 K/UL (ref 135–450)
PMV BLD AUTO: 8.9 FL (ref 5–10.5)
PO2, VEN: 69.8 MMHG (ref 25–40)
POTASSIUM REFLEX MAGNESIUM: 4.3 MMOL/L (ref 3.5–5.1)
PROTHROMBIN TIME: 22.1 SEC (ref 9.9–12.7)
RAPID INFLUENZA  B AGN: NEGATIVE
RAPID INFLUENZA A AGN: NEGATIVE
RBC # BLD: 3.64 M/UL (ref 4–5.2)
SODIUM BLD-SCNC: 131 MMOL/L (ref 136–145)
TCO2 CALC VENOUS: 60 MMOL/L
TOTAL CK: 135 U/L (ref 26–192)
TOTAL PROTEIN: 6 G/DL (ref 6.4–8.2)
TROPONIN: 0.03 NG/ML
WBC # BLD: 20.4 K/UL (ref 4–11)

## 2022-02-03 PROCEDURE — 86901 BLOOD TYPING SEROLOGIC RH(D): CPT

## 2022-02-03 PROCEDURE — 96368 THER/DIAG CONCURRENT INF: CPT

## 2022-02-03 PROCEDURE — 3209999900 CT THORACIC SPINE TRAUMA RECONSTRUCTION

## 2022-02-03 PROCEDURE — 36415 COLL VENOUS BLD VENIPUNCTURE: CPT

## 2022-02-03 PROCEDURE — 82550 ASSAY OF CK (CPK): CPT

## 2022-02-03 PROCEDURE — 85025 COMPLETE CBC W/AUTO DIFF WBC: CPT

## 2022-02-03 PROCEDURE — 87040 BLOOD CULTURE FOR BACTERIA: CPT

## 2022-02-03 PROCEDURE — 70450 CT HEAD/BRAIN W/O DYE: CPT

## 2022-02-03 PROCEDURE — 73700 CT LOWER EXTREMITY W/O DYE: CPT

## 2022-02-03 PROCEDURE — 72125 CT NECK SPINE W/O DYE: CPT

## 2022-02-03 PROCEDURE — 99285 EMERGENCY DEPT VISIT HI MDM: CPT

## 2022-02-03 PROCEDURE — 84484 ASSAY OF TROPONIN QUANT: CPT

## 2022-02-03 PROCEDURE — 80053 COMPREHEN METABOLIC PANEL: CPT

## 2022-02-03 PROCEDURE — 81001 URINALYSIS AUTO W/SCOPE: CPT

## 2022-02-03 PROCEDURE — 2580000003 HC RX 258: Performed by: EMERGENCY MEDICINE

## 2022-02-03 PROCEDURE — 71260 CT THORAX DX C+: CPT

## 2022-02-03 PROCEDURE — 6360000004 HC RX CONTRAST MEDICATION: Performed by: EMERGENCY MEDICINE

## 2022-02-03 PROCEDURE — 96366 THER/PROPH/DIAG IV INF ADDON: CPT

## 2022-02-03 PROCEDURE — 3209999900 CT LUMBAR SPINE TRAUMA RECONSTRUCTION

## 2022-02-03 PROCEDURE — 96361 HYDRATE IV INFUSION ADD-ON: CPT

## 2022-02-03 PROCEDURE — 87086 URINE CULTURE/COLONY COUNT: CPT

## 2022-02-03 PROCEDURE — 87150 DNA/RNA AMPLIFIED PROBE: CPT

## 2022-02-03 PROCEDURE — 85610 PROTHROMBIN TIME: CPT

## 2022-02-03 PROCEDURE — 86850 RBC ANTIBODY SCREEN: CPT

## 2022-02-03 PROCEDURE — U0003 INFECTIOUS AGENT DETECTION BY NUCLEIC ACID (DNA OR RNA); SEVERE ACUTE RESPIRATORY SYNDROME CORONAVIRUS 2 (SARS-COV-2) (CORONAVIRUS DISEASE [COVID-19]), AMPLIFIED PROBE TECHNIQUE, MAKING USE OF HIGH THROUGHPUT TECHNOLOGIES AS DESCRIBED BY CMS-2020-01-R: HCPCS

## 2022-02-03 PROCEDURE — 83605 ASSAY OF LACTIC ACID: CPT

## 2022-02-03 PROCEDURE — 93005 ELECTROCARDIOGRAM TRACING: CPT | Performed by: EMERGENCY MEDICINE

## 2022-02-03 PROCEDURE — 87804 INFLUENZA ASSAY W/OPTIC: CPT

## 2022-02-03 PROCEDURE — 86900 BLOOD TYPING SEROLOGIC ABO: CPT

## 2022-02-03 PROCEDURE — 96365 THER/PROPH/DIAG IV INF INIT: CPT

## 2022-02-03 PROCEDURE — 82803 BLOOD GASES ANY COMBINATION: CPT

## 2022-02-03 PROCEDURE — 6360000002 HC RX W HCPCS: Performed by: EMERGENCY MEDICINE

## 2022-02-03 PROCEDURE — U0005 INFEC AGEN DETEC AMPLI PROBE: HCPCS

## 2022-02-03 RX ORDER — SODIUM CHLORIDE, SODIUM LACTATE, POTASSIUM CHLORIDE, CALCIUM CHLORIDE 600; 310; 30; 20 MG/100ML; MG/100ML; MG/100ML; MG/100ML
1250 INJECTION, SOLUTION INTRAVENOUS ONCE
Status: COMPLETED | OUTPATIENT
Start: 2022-02-03 | End: 2022-02-04

## 2022-02-03 RX ORDER — SODIUM CHLORIDE 0.9 % (FLUSH) 0.9 %
5-40 SYRINGE (ML) INJECTION PRN
Status: DISCONTINUED | OUTPATIENT
Start: 2022-02-03 | End: 2022-02-04 | Stop reason: HOSPADM

## 2022-02-03 RX ORDER — FLUTICASONE PROPIONATE 50 MCG
2 SPRAY, SUSPENSION (ML) NASAL DAILY
Status: CANCELLED | OUTPATIENT
Start: 2022-02-04

## 2022-02-03 RX ORDER — URSODIOL 300 MG/1
300 CAPSULE ORAL 2 TIMES DAILY
Status: CANCELLED | OUTPATIENT
Start: 2022-02-03

## 2022-02-03 RX ORDER — SODIUM CHLORIDE 0.9 % (FLUSH) 0.9 %
5-40 SYRINGE (ML) INJECTION EVERY 12 HOURS SCHEDULED
Status: DISCONTINUED | OUTPATIENT
Start: 2022-02-03 | End: 2022-02-04 | Stop reason: HOSPADM

## 2022-02-03 RX ORDER — FAMOTIDINE 20 MG/1
1 TABLET, FILM COATED ORAL DAILY
Status: CANCELLED | OUTPATIENT
Start: 2022-02-04

## 2022-02-03 RX ORDER — SODIUM CHLORIDE 9 MG/ML
25 INJECTION, SOLUTION INTRAVENOUS PRN
Status: DISCONTINUED | OUTPATIENT
Start: 2022-02-03 | End: 2022-02-04 | Stop reason: HOSPADM

## 2022-02-03 RX ORDER — 0.9 % SODIUM CHLORIDE 0.9 %
1000 INTRAVENOUS SOLUTION INTRAVENOUS ONCE
Status: COMPLETED | OUTPATIENT
Start: 2022-02-03 | End: 2022-02-03

## 2022-02-03 RX ADMIN — CEFEPIME HYDROCHLORIDE 2000 MG: 2 INJECTION, POWDER, FOR SOLUTION INTRAVENOUS at 21:57

## 2022-02-03 RX ADMIN — IOPAMIDOL 75 ML: 755 INJECTION, SOLUTION INTRAVENOUS at 21:51

## 2022-02-03 RX ADMIN — Medication 10 ML: at 22:15

## 2022-02-03 RX ADMIN — SODIUM CHLORIDE, POTASSIUM CHLORIDE, SODIUM LACTATE AND CALCIUM CHLORIDE 1250 ML: 600; 310; 30; 20 INJECTION, SOLUTION INTRAVENOUS at 22:40

## 2022-02-03 RX ADMIN — VANCOMYCIN HYDROCHLORIDE 1750 MG: 10 INJECTION, POWDER, LYOPHILIZED, FOR SOLUTION INTRAVENOUS at 22:18

## 2022-02-03 RX ADMIN — SODIUM CHLORIDE 1000 ML: 9 INJECTION, SOLUTION INTRAVENOUS at 21:05

## 2022-02-03 ASSESSMENT — PAIN SCALES - GENERAL: PAINLEVEL_OUTOF10: 2

## 2022-02-04 ENCOUNTER — APPOINTMENT (OUTPATIENT)
Dept: GENERAL RADIOLOGY | Age: 82
DRG: 871 | End: 2022-02-04
Attending: INTERNAL MEDICINE
Payer: MEDICARE

## 2022-02-04 ENCOUNTER — APPOINTMENT (OUTPATIENT)
Dept: MRI IMAGING | Age: 82
DRG: 871 | End: 2022-02-04
Attending: INTERNAL MEDICINE
Payer: MEDICARE

## 2022-02-04 ENCOUNTER — HOSPITAL ENCOUNTER (INPATIENT)
Age: 82
LOS: 2 days | Discharge: NURSING, REHAB OR LONG-TERM CARE | DRG: 871 | End: 2022-02-06
Attending: INTERNAL MEDICINE | Admitting: INTERNAL MEDICINE
Payer: MEDICARE

## 2022-02-04 VITALS
WEIGHT: 181 LBS | HEART RATE: 129 BPM | HEIGHT: 60 IN | DIASTOLIC BLOOD PRESSURE: 66 MMHG | BODY MASS INDEX: 35.53 KG/M2 | SYSTOLIC BLOOD PRESSURE: 112 MMHG | RESPIRATION RATE: 19 BRPM | TEMPERATURE: 98.2 F | OXYGEN SATURATION: 98 %

## 2022-02-04 LAB
ALBUMIN SERPL-MCNC: 2.2 G/DL (ref 3.4–5)
ALBUMIN SERPL-MCNC: 2.3 G/DL (ref 3.4–5)
ALP BLD-CCNC: 332 U/L (ref 40–129)
ALT SERPL-CCNC: 438 U/L (ref 10–40)
ANION GAP SERPL CALCULATED.3IONS-SCNC: 7 MMOL/L (ref 3–16)
ANION GAP SERPL CALCULATED.3IONS-SCNC: 8 MMOL/L (ref 3–16)
ANION GAP SERPL CALCULATED.3IONS-SCNC: 9 MMOL/L (ref 3–16)
AST SERPL-CCNC: 1276 U/L (ref 15–37)
BASOPHILS ABSOLUTE: 0 K/UL (ref 0–0.2)
BASOPHILS ABSOLUTE: 0 K/UL (ref 0–0.2)
BASOPHILS RELATIVE PERCENT: 0 %
BASOPHILS RELATIVE PERCENT: 0.1 %
BILIRUB SERPL-MCNC: 1.3 MG/DL (ref 0–1)
BILIRUBIN DIRECT: 1 MG/DL (ref 0–0.3)
BILIRUBIN URINE: NEGATIVE
BILIRUBIN, INDIRECT: 0.3 MG/DL (ref 0–1)
BLOOD, URINE: NEGATIVE
BUN BLDV-MCNC: 23 MG/DL (ref 7–20)
BUN BLDV-MCNC: 25 MG/DL (ref 7–20)
BUN BLDV-MCNC: 28 MG/DL (ref 7–20)
CALCIUM SERPL-MCNC: 7.5 MG/DL (ref 8.3–10.6)
CALCIUM SERPL-MCNC: 7.7 MG/DL (ref 8.3–10.6)
CALCIUM SERPL-MCNC: 7.7 MG/DL (ref 8.3–10.6)
CHLORIDE BLD-SCNC: 95 MMOL/L (ref 99–110)
CHLORIDE BLD-SCNC: 97 MMOL/L (ref 99–110)
CHLORIDE BLD-SCNC: 98 MMOL/L (ref 99–110)
CLARITY: CLEAR
CO2: 27 MMOL/L (ref 21–32)
CO2: 27 MMOL/L (ref 21–32)
CO2: 28 MMOL/L (ref 21–32)
COLOR: YELLOW
COMMENT UA: NORMAL
CREAT SERPL-MCNC: 0.7 MG/DL (ref 0.6–1.2)
CREAT SERPL-MCNC: <0.5 MG/DL (ref 0.6–1.2)
CREAT SERPL-MCNC: <0.5 MG/DL (ref 0.6–1.2)
EKG ATRIAL RATE: 137 BPM
EKG ATRIAL RATE: 159 BPM
EKG ATRIAL RATE: 159 BPM
EKG DIAGNOSIS: NORMAL
EKG P AXIS: 20 DEGREES
EKG P AXIS: 27 DEGREES
EKG P AXIS: 33 DEGREES
EKG P-R INTERVAL: 126 MS
EKG P-R INTERVAL: 130 MS
EKG P-R INTERVAL: 140 MS
EKG Q-T INTERVAL: 278 MS
EKG Q-T INTERVAL: 310 MS
EKG Q-T INTERVAL: 314 MS
EKG QRS DURATION: 60 MS
EKG QRS DURATION: 62 MS
EKG QRS DURATION: 68 MS
EKG QTC CALCULATION (BAZETT): 419 MS
EKG QTC CALCULATION (BAZETT): 504 MS
EKG QTC CALCULATION (BAZETT): 510 MS
EKG R AXIS: -17 DEGREES
EKG R AXIS: -28 DEGREES
EKG R AXIS: -28 DEGREES
EKG T AXIS: 35 DEGREES
EKG T AXIS: 39 DEGREES
EKG T AXIS: 40 DEGREES
EKG VENTRICULAR RATE: 137 BPM
EKG VENTRICULAR RATE: 159 BPM
EKG VENTRICULAR RATE: 159 BPM
EOSINOPHILS ABSOLUTE: 0 K/UL (ref 0–0.6)
EOSINOPHILS ABSOLUTE: 0 K/UL (ref 0–0.6)
EOSINOPHILS RELATIVE PERCENT: 0 %
EOSINOPHILS RELATIVE PERCENT: 0 %
EPITHELIAL CELLS, UA: NORMAL /HPF (ref 0–5)
FOLATE: 16.68 NG/ML (ref 4.78–24.2)
GFR AFRICAN AMERICAN: >60
GFR NON-AFRICAN AMERICAN: >60
GLUCOSE BLD-MCNC: 103 MG/DL (ref 70–99)
GLUCOSE BLD-MCNC: 118 MG/DL (ref 70–99)
GLUCOSE BLD-MCNC: 125 MG/DL (ref 70–99)
GLUCOSE BLD-MCNC: 94 MG/DL (ref 70–99)
GLUCOSE URINE: NEGATIVE MG/DL
HAV IGM SER IA-ACNC: NORMAL
HCT VFR BLD CALC: 28 % (ref 36–48)
HCT VFR BLD CALC: 28.7 % (ref 36–48)
HEMOGLOBIN: 9.2 G/DL (ref 12–16)
HEMOGLOBIN: 9.5 G/DL (ref 12–16)
HEPATITIS B CORE IGM ANTIBODY: NORMAL
HEPATITIS B SURFACE ANTIGEN INTERPRETATION: NORMAL
HEPATITIS C ANTIBODY INTERPRETATION: NORMAL
HYALINE CASTS: NORMAL /LPF (ref 0–2)
KETONES, URINE: NEGATIVE MG/DL
LACTIC ACID, SEPSIS: 1.4 MMOL/L (ref 0.4–1.9)
LACTIC ACID, SEPSIS: 2.2 MMOL/L (ref 0.4–1.9)
LEUKOCYTE ESTERASE, URINE: NEGATIVE
LYMPHOCYTES ABSOLUTE: 0.4 K/UL (ref 1–5.1)
LYMPHOCYTES ABSOLUTE: 0.5 K/UL (ref 1–5.1)
LYMPHOCYTES RELATIVE PERCENT: 2.4 %
LYMPHOCYTES RELATIVE PERCENT: 3.1 %
MAGNESIUM: 1.7 MG/DL (ref 1.8–2.4)
MCH RBC QN AUTO: 30.1 PG (ref 26–34)
MCH RBC QN AUTO: 30.5 PG (ref 26–34)
MCHC RBC AUTO-ENTMCNC: 32.7 G/DL (ref 31–36)
MCHC RBC AUTO-ENTMCNC: 33.2 G/DL (ref 31–36)
MCV RBC AUTO: 91.9 FL (ref 80–100)
MCV RBC AUTO: 92.1 FL (ref 80–100)
MICROSCOPIC EXAMINATION: YES
MONOCYTES ABSOLUTE: 0.1 K/UL (ref 0–1.3)
MONOCYTES ABSOLUTE: 0.2 K/UL (ref 0–1.3)
MONOCYTES RELATIVE PERCENT: 0.6 %
MONOCYTES RELATIVE PERCENT: 0.9 %
NEUTROPHILS ABSOLUTE: 16.3 K/UL (ref 1.7–7.7)
NEUTROPHILS ABSOLUTE: 16.8 K/UL (ref 1.7–7.7)
NEUTROPHILS RELATIVE PERCENT: 96.2 %
NEUTROPHILS RELATIVE PERCENT: 96.7 %
NITRITE, URINE: NEGATIVE
PDW BLD-RTO: 19.5 % (ref 12.4–15.4)
PDW BLD-RTO: 19.9 % (ref 12.4–15.4)
PERFORMED ON: ABNORMAL
PH UA: 6 (ref 5–8)
PHOSPHORUS: 2.1 MG/DL (ref 2.5–4.9)
PLATELET # BLD: 139 K/UL (ref 135–450)
PLATELET # BLD: 149 K/UL (ref 135–450)
PMV BLD AUTO: 8.1 FL (ref 5–10.5)
PMV BLD AUTO: 8.3 FL (ref 5–10.5)
POTASSIUM REFLEX MAGNESIUM: 4.3 MMOL/L (ref 3.5–5.1)
POTASSIUM REFLEX MAGNESIUM: 4.4 MMOL/L (ref 3.5–5.1)
POTASSIUM SERPL-SCNC: 3.7 MMOL/L (ref 3.5–5.1)
PRO-BNP: 2825 PG/ML (ref 0–449)
PROCALCITONIN: 11.13 NG/ML (ref 0–0.15)
PROTEIN UA: ABNORMAL MG/DL
RBC # BLD: 3.04 M/UL (ref 4–5.2)
RBC # BLD: 3.12 M/UL (ref 4–5.2)
RBC UA: NORMAL /HPF (ref 0–4)
SODIUM BLD-SCNC: 132 MMOL/L (ref 136–145)
SPECIFIC GRAVITY UA: >1.03 (ref 1–1.03)
TOTAL CK: 246 U/L (ref 26–192)
TOTAL PROTEIN: 5.1 G/DL (ref 6.4–8.2)
TROPONIN: <0.01 NG/ML
TROPONIN: <0.01 NG/ML
URINE REFLEX TO CULTURE: ABNORMAL
URINE TYPE: ABNORMAL
UROBILINOGEN, URINE: 1 E.U./DL
VITAMIN B-12: >2000 PG/ML (ref 211–911)
WBC # BLD: 17 K/UL (ref 4–11)
WBC # BLD: 17.4 K/UL (ref 4–11)
WBC UA: NORMAL /HPF (ref 0–5)

## 2022-02-04 PROCEDURE — 82746 ASSAY OF FOLIC ACID SERUM: CPT

## 2022-02-04 PROCEDURE — 82607 VITAMIN B-12: CPT

## 2022-02-04 PROCEDURE — 6370000000 HC RX 637 (ALT 250 FOR IP): Performed by: INTERNAL MEDICINE

## 2022-02-04 PROCEDURE — 87641 MR-STAPH DNA AMP PROBE: CPT

## 2022-02-04 PROCEDURE — 2580000003 HC RX 258

## 2022-02-04 PROCEDURE — 80076 HEPATIC FUNCTION PANEL: CPT

## 2022-02-04 PROCEDURE — 2500000003 HC RX 250 WO HCPCS

## 2022-02-04 PROCEDURE — 83605 ASSAY OF LACTIC ACID: CPT

## 2022-02-04 PROCEDURE — 74230 X-RAY XM SWLNG FUNCJ C+: CPT

## 2022-02-04 PROCEDURE — 6360000002 HC RX W HCPCS: Performed by: NURSE PRACTITIONER

## 2022-02-04 PROCEDURE — 80069 RENAL FUNCTION PANEL: CPT

## 2022-02-04 PROCEDURE — 82550 ASSAY OF CK (CPK): CPT

## 2022-02-04 PROCEDURE — 6360000002 HC RX W HCPCS

## 2022-02-04 PROCEDURE — 94761 N-INVAS EAR/PLS OXIMETRY MLT: CPT

## 2022-02-04 PROCEDURE — 93005 ELECTROCARDIOGRAM TRACING: CPT

## 2022-02-04 PROCEDURE — 6360000002 HC RX W HCPCS: Performed by: STUDENT IN AN ORGANIZED HEALTH CARE EDUCATION/TRAINING PROGRAM

## 2022-02-04 PROCEDURE — 2060000000 HC ICU INTERMEDIATE R&B

## 2022-02-04 PROCEDURE — 83880 ASSAY OF NATRIURETIC PEPTIDE: CPT

## 2022-02-04 PROCEDURE — 99221 1ST HOSP IP/OBS SF/LOW 40: CPT | Performed by: NURSE PRACTITIONER

## 2022-02-04 PROCEDURE — 92610 EVALUATE SWALLOWING FUNCTION: CPT

## 2022-02-04 PROCEDURE — 36415 COLL VENOUS BLD VENIPUNCTURE: CPT

## 2022-02-04 PROCEDURE — 2700000000 HC OXYGEN THERAPY PER DAY

## 2022-02-04 PROCEDURE — 72141 MRI NECK SPINE W/O DYE: CPT

## 2022-02-04 PROCEDURE — 2580000003 HC RX 258: Performed by: NURSE PRACTITIONER

## 2022-02-04 PROCEDURE — 93005 ELECTROCARDIOGRAM TRACING: CPT | Performed by: INTERNAL MEDICINE

## 2022-02-04 PROCEDURE — 92611 MOTION FLUOROSCOPY/SWALLOW: CPT

## 2022-02-04 PROCEDURE — 92526 ORAL FUNCTION THERAPY: CPT

## 2022-02-04 PROCEDURE — 93010 ELECTROCARDIOGRAM REPORT: CPT | Performed by: INTERNAL MEDICINE

## 2022-02-04 PROCEDURE — 93005 ELECTROCARDIOGRAM TRACING: CPT | Performed by: EMERGENCY MEDICINE

## 2022-02-04 PROCEDURE — 84145 PROCALCITONIN (PCT): CPT

## 2022-02-04 PROCEDURE — 6370000000 HC RX 637 (ALT 250 FOR IP): Performed by: STUDENT IN AN ORGANIZED HEALTH CARE EDUCATION/TRAINING PROGRAM

## 2022-02-04 PROCEDURE — 84443 ASSAY THYROID STIM HORMONE: CPT

## 2022-02-04 PROCEDURE — 6360000002 HC RX W HCPCS: Performed by: INTERNAL MEDICINE

## 2022-02-04 PROCEDURE — 85025 COMPLETE CBC W/AUTO DIFF WBC: CPT

## 2022-02-04 PROCEDURE — 87449 NOS EACH ORGANISM AG IA: CPT

## 2022-02-04 PROCEDURE — 80074 ACUTE HEPATITIS PANEL: CPT

## 2022-02-04 PROCEDURE — 84484 ASSAY OF TROPONIN QUANT: CPT

## 2022-02-04 PROCEDURE — 83735 ASSAY OF MAGNESIUM: CPT

## 2022-02-04 RX ORDER — SODIUM CHLORIDE 9 MG/ML
25 INJECTION, SOLUTION INTRAVENOUS PRN
Status: DISCONTINUED | OUTPATIENT
Start: 2022-02-04 | End: 2022-02-06 | Stop reason: HOSPADM

## 2022-02-04 RX ORDER — METOPROLOL TARTRATE 5 MG/5ML
5 INJECTION INTRAVENOUS ONCE
Status: COMPLETED | OUTPATIENT
Start: 2022-02-04 | End: 2022-02-04

## 2022-02-04 RX ORDER — URSODIOL 300 MG/1
300 CAPSULE ORAL 2 TIMES DAILY
Status: DISCONTINUED | OUTPATIENT
Start: 2022-02-04 | End: 2022-02-06 | Stop reason: HOSPADM

## 2022-02-04 RX ORDER — CHLORTHALIDONE 25 MG/1
25 TABLET ORAL DAILY
Status: DISCONTINUED | OUTPATIENT
Start: 2022-02-05 | End: 2022-02-06 | Stop reason: HOSPADM

## 2022-02-04 RX ORDER — ACETAMINOPHEN 650 MG/1
650 SUPPOSITORY RECTAL EVERY 6 HOURS PRN
Status: DISCONTINUED | OUTPATIENT
Start: 2022-02-04 | End: 2022-02-06 | Stop reason: HOSPADM

## 2022-02-04 RX ORDER — DEXTROSE MONOHYDRATE 100 MG/ML
12.5 INJECTION, SOLUTION INTRAVENOUS PRN
Status: DISCONTINUED | OUTPATIENT
Start: 2022-02-04 | End: 2022-02-06 | Stop reason: HOSPADM

## 2022-02-04 RX ORDER — SODIUM CHLORIDE 9 MG/ML
INJECTION, SOLUTION INTRAVENOUS CONTINUOUS
Status: ACTIVE | OUTPATIENT
Start: 2022-02-04 | End: 2022-02-04

## 2022-02-04 RX ORDER — SODIUM CHLORIDE 0.9 % (FLUSH) 0.9 %
5-40 SYRINGE (ML) INJECTION EVERY 12 HOURS SCHEDULED
Status: DISCONTINUED | OUTPATIENT
Start: 2022-02-04 | End: 2022-02-06 | Stop reason: HOSPADM

## 2022-02-04 RX ORDER — LINEZOLID 2 MG/ML
600 INJECTION, SOLUTION INTRAVENOUS EVERY 12 HOURS
Status: DISCONTINUED | OUTPATIENT
Start: 2022-02-04 | End: 2022-02-05

## 2022-02-04 RX ORDER — ACETAMINOPHEN 325 MG/1
650 TABLET ORAL EVERY 6 HOURS PRN
Status: DISCONTINUED | OUTPATIENT
Start: 2022-02-04 | End: 2022-02-06 | Stop reason: HOSPADM

## 2022-02-04 RX ORDER — 0.9 % SODIUM CHLORIDE 0.9 %
1000 INTRAVENOUS SOLUTION INTRAVENOUS ONCE
Status: COMPLETED | OUTPATIENT
Start: 2022-02-04 | End: 2022-02-04

## 2022-02-04 RX ORDER — METOPROLOL TARTRATE 5 MG/5ML
INJECTION INTRAVENOUS
Status: COMPLETED
Start: 2022-02-04 | End: 2022-02-04

## 2022-02-04 RX ORDER — NICOTINE POLACRILEX 4 MG
15 LOZENGE BUCCAL PRN
Status: DISCONTINUED | OUTPATIENT
Start: 2022-02-04 | End: 2022-02-06 | Stop reason: HOSPADM

## 2022-02-04 RX ORDER — FAMOTIDINE 20 MG/1
1 TABLET, FILM COATED ORAL 2 TIMES DAILY
Status: DISCONTINUED | OUTPATIENT
Start: 2022-02-04 | End: 2022-02-04

## 2022-02-04 RX ORDER — SODIUM CHLORIDE 9 MG/ML
INJECTION, SOLUTION INTRAVENOUS CONTINUOUS
Status: DISCONTINUED | OUTPATIENT
Start: 2022-02-04 | End: 2022-02-04

## 2022-02-04 RX ORDER — SODIUM CHLORIDE 0.9 % (FLUSH) 0.9 %
5-40 SYRINGE (ML) INJECTION PRN
Status: DISCONTINUED | OUTPATIENT
Start: 2022-02-04 | End: 2022-02-06 | Stop reason: HOSPADM

## 2022-02-04 RX ORDER — 0.9 % SODIUM CHLORIDE 0.9 %
500 INTRAVENOUS SOLUTION INTRAVENOUS ONCE
Status: COMPLETED | OUTPATIENT
Start: 2022-02-04 | End: 2022-02-04

## 2022-02-04 RX ORDER — DEXTROSE MONOHYDRATE 50 MG/ML
100 INJECTION, SOLUTION INTRAVENOUS PRN
Status: DISCONTINUED | OUTPATIENT
Start: 2022-02-04 | End: 2022-02-06 | Stop reason: HOSPADM

## 2022-02-04 RX ORDER — ATORVASTATIN CALCIUM 10 MG/1
10 TABLET, FILM COATED ORAL DAILY
Refills: 2 | Status: DISCONTINUED | OUTPATIENT
Start: 2022-02-04 | End: 2022-02-06 | Stop reason: HOSPADM

## 2022-02-04 RX ORDER — LORAZEPAM 2 MG/ML
0.5 INJECTION INTRAMUSCULAR
Status: COMPLETED | OUTPATIENT
Start: 2022-02-04 | End: 2022-02-04

## 2022-02-04 RX ORDER — METHOCARBAMOL 750 MG/1
750 TABLET, FILM COATED ORAL EVERY 6 HOURS
Status: DISCONTINUED | OUTPATIENT
Start: 2022-02-05 | End: 2022-02-06 | Stop reason: HOSPADM

## 2022-02-04 RX ADMIN — SODIUM CHLORIDE 1000 ML: 9 INJECTION, SOLUTION INTRAVENOUS at 08:05

## 2022-02-04 RX ADMIN — METOPROLOL TARTRATE 5 MG: 5 INJECTION INTRAVENOUS at 08:37

## 2022-02-04 RX ADMIN — SODIUM CHLORIDE 25 ML: 9 INJECTION, SOLUTION INTRAVENOUS at 06:39

## 2022-02-04 RX ADMIN — SODIUM CHLORIDE 25 ML: 9 INJECTION, SOLUTION INTRAVENOUS at 19:05

## 2022-02-04 RX ADMIN — METHOCARBAMOL 1000 MG: 100 INJECTION, SOLUTION INTRAMUSCULAR; INTRAVENOUS at 17:02

## 2022-02-04 RX ADMIN — SODIUM CHLORIDE 500 ML: 900 INJECTION, SOLUTION INTRAVENOUS at 06:08

## 2022-02-04 RX ADMIN — HYDROMORPHONE HYDROCHLORIDE 0.5 MG: 1 INJECTION, SOLUTION INTRAMUSCULAR; INTRAVENOUS; SUBCUTANEOUS at 05:11

## 2022-02-04 RX ADMIN — METOPROLOL TARTRATE 25 MG: 25 TABLET, FILM COATED ORAL at 11:56

## 2022-02-04 RX ADMIN — SODIUM CHLORIDE, PRESERVATIVE FREE 10 ML: 5 INJECTION INTRAVENOUS at 08:40

## 2022-02-04 RX ADMIN — PIPERACILLIN SODIUM AND TAZOBACTAM SODIUM 4500 MG: 4; .5 INJECTION, POWDER, LYOPHILIZED, FOR SOLUTION INTRAVENOUS at 06:40

## 2022-02-04 RX ADMIN — SODIUM CHLORIDE 500 ML: 9 INJECTION, SOLUTION INTRAVENOUS at 03:38

## 2022-02-04 RX ADMIN — LORAZEPAM 0.5 MG: 2 INJECTION INTRAMUSCULAR; INTRAVENOUS at 13:25

## 2022-02-04 RX ADMIN — METOPROLOL TARTRATE 25 MG: 25 TABLET, FILM COATED ORAL at 23:23

## 2022-02-04 RX ADMIN — LINEZOLID 600 MG: 2 INJECTION, SOLUTION INTRAVENOUS at 17:51

## 2022-02-04 RX ADMIN — SODIUM CHLORIDE 25 ML: 9 INJECTION, SOLUTION INTRAVENOUS at 17:02

## 2022-02-04 RX ADMIN — PIPERACILLIN SODIUM AND TAZOBACTAM SODIUM 4500 MG: 4; 500 INJECTION, POWDER, FOR SOLUTION INTRAVENOUS at 19:05

## 2022-02-04 RX ADMIN — SODIUM CHLORIDE 25 ML: 9 INJECTION, SOLUTION INTRAVENOUS at 17:51

## 2022-02-04 RX ADMIN — HYDROMORPHONE HYDROCHLORIDE 1 MG: 1 INJECTION, SOLUTION INTRAMUSCULAR; INTRAVENOUS; SUBCUTANEOUS at 09:09

## 2022-02-04 RX ADMIN — VERAPAMIL HYDROCHLORIDE 180 MG: 180 TABLET, FILM COATED, EXTENDED RELEASE ORAL at 23:23

## 2022-02-04 ASSESSMENT — PAIN SCALES - GENERAL
PAINLEVEL_OUTOF10: 6
PAINLEVEL_OUTOF10: 6
PAINLEVEL_OUTOF10: 0
PAINLEVEL_OUTOF10: 0

## 2022-02-04 ASSESSMENT — PAIN - FUNCTIONAL ASSESSMENT
PAIN_FUNCTIONAL_ASSESSMENT: 0-10
PAIN_FUNCTIONAL_ASSESSMENT: PREVENTS OR INTERFERES SOME ACTIVE ACTIVITIES AND ADLS

## 2022-02-04 ASSESSMENT — ENCOUNTER SYMPTOMS
WHEEZING: 0
BLOOD IN STOOL: 0
SORE THROAT: 0
CONSTIPATION: 0
COLOR CHANGE: 0
ABDOMINAL PAIN: 0
RESPIRATORY NEGATIVE: 1
VOMITING: 0
COUGH: 0
SHORTNESS OF BREATH: 0
CHEST TIGHTNESS: 0
NAUSEA: 0
DIARRHEA: 0
RHINORRHEA: 0
GASTROINTESTINAL NEGATIVE: 1
ABDOMINAL DISTENTION: 0

## 2022-02-04 ASSESSMENT — PAIN DESCRIPTION - DESCRIPTORS: DESCRIPTORS: DISCOMFORT;SORE

## 2022-02-04 NOTE — CONSULTS
NEUROSURGERY CONSULT NOTE    Lydia Borges  [id number]   1940 2/4/2022    Requesting physician: Kelton Mabry MD    Reason for consultation: C2 Fracture    History of present illness: Patient is a 79 y.o. female that  has a past medical history of Allergic, Anemia of chronic disease (3/10/2021), Arthritis, Cancer ([facility] [address] Hyperlipidemia, Hypertension, Obesity, Pre-diabetes, and S/P colonoscopy (1990). Patient presented on 2/3/2022 to [facility] ED s/p fall c/o hip pain and neck pain. Patient states she fell after getting dizzy, but no LOC. Patient endorses dizziness quite often. Patient states she was having right hip pain and neck pain, which she states has been painful for the past 2 to 3 weeks. She states that she is scheduled to get an MRI of her neck at a later date. CT Cervical in ED revealed C2 fracture. Patient transferred to [facility] for further neurosurgical w/u    ROS:   GENERAL:  Denies fever or recent illness.  Denies weight changes   EYES:  Denies vision change or diplopia  EARS:  Denies hearing loss  CARDIAC:  Denies chest pain  RESPIRATORY:  Denies shortness of breath  SKIN:  Denies rash or lesions   HEM:  Denies excessive bruising  PSYCH:  Denies anxiety or depression  NEURO:  Denies headache, numbness or tingling or lateralizing weakness   :  Denies urinary difficulty  GI: Denies nausea, vomiting, diarrhea or constipation  MUSCULOSKELETAL: Endorses neck pain and right hip pain    Allergies   Allergen Reactions    Penicillins     Tetracyclines & Related Nausea Only       Past Medical History:   Diagnosis Date    Allergic     Anemia of chronic disease 3/10/2021    Arthritis     Cancer ([facility] [address]     Hyperlipidemia     Hypertension     Obesity     Pre-diabetes     S/P colonoscopy 1990        Past Surgical History:   Procedure Laterality Date    APPENDECTOMY      BRONCHOSCOPY N/A 6/18/2020    BRONCHOSCOPY ALVEOLAR LAVAGE performed by Geovani Guo MD at [facility] BRONCHOSCOPY N/A 6/18/2020    BRONCHOSCOPY BRUSHINGS performed by Lawanda Mcclendon MD at [address] N/A 6/18/2020    BRONCHOSCOPY BIOPSY BRONCHUS performed by Lawanda Mcclendon MD at [address]  6/18/2020    BRONCHOSCOPY W/EBUS FNA performed by Lawanda Mcclendon MD at [address] N/A 6/18/2020    BRONCHOSCOPY ENDOBRONCHIAL ULTRASOUND performed by Lawanda Mcclendon MD at [address] Bilateral     TONSILLECTOMY         Social History     Occupational History    Occupation: Retired   Tobacco Use    Smoking status: Never Smoker    Smokeless tobacco: Never Used   Vaping Use    Vaping Use: Never used   Substance and Sexual Activity    Alcohol use: Not Currently    Drug use: No    Sexual activity: Never        Family History   Problem Relation Age of Onset    Heart Disease Mother         Outpatient Medications Marked as Taking for the 2/4/22 encounter [facility] Encounter)   Medication Sig Dispense Refill    lisinopril (PRINIVIL;ZESTRIL) 40 MG tablet TAKE ONE TABLET BY MOUTH DAILY 90 tablet 2    simvastatin (ZOCOR) 20 MG tablet TAKE ONE TABLET BY MOUTH AT BEDTIME 90 tablet 2    potassium chloride 20 MEQ/15ML (10%) oral solution TAKE 15MLS BY MOUTH DAILY      magnesium oxide (MAG-OX) 400 MG tablet TAKE ONE TABLET BY MOUTH DAILY      metoprolol tartrate (LOPRESSOR) 25 MG tablet TAKE ONE TABLET BY MOUTH TWICE A  tablet 3    Cholecalciferol (VITAMIN D3) 2000 UNITS CAPS Take by mouth          Current Facility-Administered Medications   Medication Dose Route Frequency Provider Last Rate Last Admin    [Held by provider] atorvastatin (LIPITOR) tablet 10 mg  10 mg Oral Daily Carmen Garcia MD        [Held by provider] chlorthalidone (HYGROTON) tablet 25 mg  25 mg Oral Daily Carmen Garcia MD        [Held by provider] ursodiol (ACTIGALL) capsule 300 mg  300 mg Oral BID Carmen Garcia MD       [facility] [place] by provider] verapamil (CALAN SR) extended release tablet 180 mg  180 mg Oral BID Sofya Sosa MD        sodium chloride flush 0.9 % injection 5-40 mL  5-40 mL IntraVENous 2 times per day Sofya Sosa MD   10 mL at 02/04/22 0840    sodium chloride flush 0.9 % injection 5-40 mL  5-40 mL IntraVENous PRN Sofya Sosa MD        0.9 % sodium chloride infusion  25 mL IntraVENous PRN Sofya Sosa  mL/hr at 02/04/22 0639 25 mL at 02/04/22 0639    acetaminophen (TYLENOL) tablet 650 mg  650 mg Oral Q6H PRN Sofya Sosa MD        Or   [facility] acetaminophen (TYLENOL) suppository 650 mg  650 mg Rectal Q6H PRN Sofya Sosa MD        perflutren lipid microspheres (DEFINITY) injection 1.65 mg  1.5 mL IntraVENous ONCE PRN Sofya Sosa MD        glucose (GLUTOSE) 40 % oral gel 15 g  15 g Oral PRN Sofya Sosa MD        dextrose 10 % infusion  12.5 g IntraVENous PRN Sofya Sosa MD        glucagon (rDNA) injection 1 mg  1 mg IntraMUSCular PRN Sofya Sosa MD        dextrose 5 % solution  100 mL/hr IntraVENous PRN Sofya Sosa MD        HYDROmorphone (DILAUDID) injection 0.5 mg  0.5 mg IntraVENous Q6H PRN Sofya Sosa MD   0.5 mg at 02/04/22 0511    piperacillin-tazobactam (ZOSYN) 4,500 mg in dextrose 5 % 100 mL IVPB extended infusion (mini-bag)  4,500 mg IntraVENous [facility] MD Cecilia        metoprolol tartrate (LOPRESSOR) tablet 25 mg  25 mg Oral BID Joseph Holm MD   25 mg at 02/04/22 1156    linezolid (ZYVOX) IVPB 600 mg  600 mg IntraVENous Q12H Joseph Holm MD        LORazepam (ATIVAN) injection 0.5 mg  0.5 mg IntraVENous Once PRN KALYAN Mathew - CNP            Objective:  /75   Pulse 102   Temp 97.9 °F (36.6 °C) (Oral)   Resp 20   Ht 5' (1.524 m)   Wt 181 lb 7 oz (82.3 kg)   LMP 05/20/1991   SpO2 98%   BMI 35.43 kg/m²     Physical Exam:   Patient seen and examined  GCS:  4 - Opens eyes on own  5 - Alert and oriented  6 - Follows simple motor commands  General: Well developed.  Alert and cooperative in no acute distress. HENT: atraumatic, neck supple  Eyes: Optic discs: Not tested  Pulmonary: unlabored respiratory effort  Cardiovascular:  Warm well perfused. No peripheral edema  Gastrointestinal: abdomen soft, NT, ND    Neurological:  Mental Status: Awake, alert, oriented x 4, speech clear and appropriate  Attention: Intact  Language: No aphasia or dysarthria noted  Sensation: Intact to all extremities to light touch  Coordination: Intact  DTRs:    Right  Left    Ward's Neg Neg   ankle clonus  Neg Neg   toes (babinski)  Down Down     Cranial Nerves:  II: Visual acuity not tested, denies new visual changes / diplopia  III, IV, VI: PERRL, 3 mm bilaterally, EOMI, no nystagmus noted  V: Facial sensation intact bilaterally to touch  VII: Face symmetric  VIII: Hearing intact bilaterally to spoken voice  IX: Palate movement equal bilaterally  XI: Shoulder shrug equal bilaterally  XII: Tongue midline    Musculoskeletal:   Gait: Not tested   Assist devices: None   Tone: Normal  Motor strength:    Right  Left    Right  Left    Deltoid  5 5  Hip Flex  5 5   Biceps  5 5  Knee Extensors  5 5   Triceps  5 5  Knee Flexors  5 5   Wrist Ext  5 5  Ankle Dorsiflex. 5 5   Wrist Flex  5 5  Ankle Plantarflex. 5 5   Handgrip  5 5  Ext Bubba Longus  5 5   Thumb Ext  5 5         Radiological Findings:  CT Head WO Contrast  Result Date: 2/3/2022  No acute intracranial abnormality. Mild generalized atrophy and chronic ischemic changes. CT Cervical Spine WO Contrast  Result Date: 2/3/2022  Acute type 2 odontoid fracture with slight anterior displacement of C1 and the odontoid process in relation to the body of C2.    CT THORACIC SPINE TRAUMA RECONSTRUCTION  Result Date: 2/3/2022  Osteopenia and mild general degenerative changes but no acute fracture or subluxation at the thoracic spine. CT LUMBAR SPINE TRAUMA RECONSTRUCTION  Result Date: 2/3/2022  Osteopenia and severe degenerative changes.   No acute fracture or subluxation at the lumbar spine. Labs:  Recent Labs     02/04/22  1058   WBC 17.0*   HGB 9.5*   HCT 28.7*          Recent Labs     02/04/22  1058   *   K 4.3   CL 97*   CO2 27   BUN 25*   CREATININE <0.5*   GLUCOSE 94   CALCIUM 7.7*   MG 1.70*       Recent Labs     02/03/22 2056   PROTIME 22.1*   INR 1.90*       Patient Active Problem List    Diagnosis Date Noted    Sepsis ([facility] [address] 02/03/2022    Pressure ulcer of right buttock, stage 3 ([facility] [address] 03/10/2021    Anemia of chronic disease 03/10/2021    Small cell lung cancer, right ([facility] [address] 02/25/2021    Neuroendocrine tumor 06/26/2020    Chronic hypoxemic respiratory failure ([facility] [address] 06/26/2020    Atelectasis 06/26/2020    Mediastinal adenopathy 06/17/2020    Hilar adenopathy 06/17/2020    Pulmonary infiltrate 06/17/2020    Pleural effusion on right 06/17/2020    Hepatic lesion 06/17/2020    Pyuria 06/17/2020    Hypokalemia 06/17/2020    Suspected sleep apnea 06/17/2020    Elevated liver enzymes 06/16/2020    SOB (shortness of breath) 06/16/2020    Acquired varus deformity knee 07/14/2016    Primary osteoarthritis of left knee 07/14/2016    Chondromalacia patellae of left knee 07/14/2016    Chronic pain of left knee 07/14/2016    Primary osteoarthritis of right knee 07/14/2016    Chondromalacia patellae of right knee 07/14/2016    Chronic pain of right knee 07/14/2016    Hyperlipidemia 05/25/2016    Primary osteoarthritis of knee 05/25/2016    OA (osteoarthritis) of knee 07/11/2012    HTN (hypertension) 07/11/2012       Assessment:  Patient is a 79 y.o. female w/neck pain s/p fall. CT Cervical revealed C2 fracture. Plan:  1. No emergent neurosurgical intervention indicated  2. Neurologic exams frequency: Q4H  3. For change in exam MUST contact neurosurgery team along with critical care or primary team  4.  C2 Fracture:  - CT Cervical revealed acute type 2 odontoid fracture with slight anterior displacement of C1 and the odontoid process in relation to the body of C2  - MRI Cervical to r/o ligamentous injury  - Cervical collar to be worn at all times  - Cervical collar does NOT need to be worn when eating, bathing or showering  - Cervical collar pads to be cleaned with soap & water, air dried, and changed daily  5. Muscle spasms: Robaxin  6. Pain control: Managed by medical team  7. PT/OT consulted, appreciate recs  8. Advance diet / activity per primary team  9. Thank you for consult. Will follow inpatient. Please call with any questions or decline in neurological status    DISPO: Remain inpatient from neurosurgery standpoint. Dispo timing to be determined by primary team once patient is medically stable for discharge. Patient was discussed and images reviewed with Dr. Delfina Alvarez who agrees with above assessment and plan.      Electronically signed by: KALYAN Singh CNP, APRN-CNP, 2/4/2022 2:08 PM  [phone]

## 2022-02-04 NOTE — H&P
Internal Medicine PGY- 1 Resident History & Physical      PCP: Aleena Saldivar MD    Date of Admission: 2/4/2022    Chief Complaint:  Fall  History Of Present Illness:      Kaci Gamez is a 79 y.o. female w/ PMHX of small cell lung cancer on chemotherapy, HTN, HLD presented to the [facility] after a fall at home. The patient uses a walker at home to ambulate and lives by herself. She fell around 6 PM when she was walking from kitchen to lounge, and was found by the local police 2.5 hours on the floor. She was unsure if she felt dizzy or stumbled upon an object, however fell on her hips. She does not report hitting her head or losing consciousness. On arrival to the ED, she was AOx4, hypotensive to systolic 86'T, tachycardic in 130's, normal RR, Saturating 98% on 4L Oxygen. (The patient uses 2L oxygen at baseline). Labs were remarkable for WBC 20.4, Hb 10.8, LA 7.1, Anion gap 19, Troponin 0.03 without EKG changes other than sinus tachycardia, GI liver profile remarkable for Alk Phos 417, , , Bilirubin 1.2. Blood cultures were sent, UA was unremarkable, Rapid flu negative, Carboxyhemoglobin 4.2. CT head was unremarkable for acute process, CT cervical spine remarkable for Acute type 2 odontoid fracture with slight anterior displacement of the C1 and odontoid process in relation to the body of C2. CT Scan of the right hip was unremarkable. CT Scan of chest remarkable for extensive consolidative changes likely related to tumor infiltration. Scattered areas of consolidation in BL lobes were suspicious for an infectious/inflammatory distribution. She received a 2.5L IV fluids and Cefepime in the ED and was transferred to [facility]. for further management. Of note, the patient sees a chiropractor regularly and saw him after a fall in January for head and neck adjustments and followed up, the last adjustment being on the 1st of February.  She reported decreased ROM at neck that prevented her from lifting her head up. ROS of system positive for dizziness earlier this year and neck pain. It was negative for fevers, chills, chest pain, SOB, cough, nausea, vomiting, diarrhea or urinary complaints. She did not have any neurological deficits on exam.     Past Medical History:        Diagnosis Date    Allergic     Anemia of chronic disease 3/10/2021    Arthritis     Cancer ([address]     Hyperlipidemia     Hypertension     Obesity     Pre-diabetes     S/P colonoscopy 1990       Past Surgical History:          Procedure Laterality Date    APPENDECTOMY      BRONCHOSCOPY N/A 6/18/2020    BRONCHOSCOPY ALVEOLAR LAVAGE performed by Maxx Tomas MD at [facility] 6/18/2020    BRONCHOSCOPY BRUSHINGS performed by Maxx Tomas MD at [facility] 6/18/2020    BRONCHOSCOPY BIOPSY BRONCHUS performed by Maxx Tomas MD at [address]  6/18/2020    BRONCHOSCOPY W/EBUS FNA performed by Maxx Tomas MD at [address] N/A 6/18/2020    BRONCHOSCOPY ENDOBRONCHIAL ULTRASOUND performed by Maxx Tomas MD at [address] Bilateral     TONSILLECTOMY         Medications Prior to Admission:      Prior to Admission medications    Medication Sig Start Date End Date Taking?  Authorizing Provider   famotidine (PEPCID) 20 MG tablet TAKE ONE TABLET BY MOUTH TWICE A DAY 9/16/21   Jerardo Fernando MD   chlorthalidone (HYGROTON) 25 MG tablet TAKE ONE TABLET BY MOUTH DAILY 6/9/21   Jerardo Fernando MD   lisinopril (PRINIVIL;ZESTRIL) 40 MG tablet TAKE ONE TABLET BY MOUTH DAILY 6/9/21   Jerardo Fernando MD   simvastatin (ZOCOR) 20 MG tablet TAKE ONE TABLET BY MOUTH AT BEDTIME 6/9/21   Jerardo Fernando MD   potassium chloride 20 MEQ/15ML (10%) oral solution TAKE 15MLS BY MOUTH DAILY 2/18/21   Historical Provider, MD   magnesium oxide (MAG-OX) 400 MG tablet TAKE ONE TABLET BY MOUTH DAILY 2/18/21   Historical Provider, MD   ursodiol (ACTIGALL) 300 MG capsule Take 300 mg by mouth 2 times daily    Historical Provider, MD   verapamil (CALAN SR) 180 MG extended release tablet TAKE ONE TABLET BY MOUTH TWICE A DAY 2/25/21   Alysa Watson MD   metoprolol tartrate (LOPRESSOR) 25 MG tablet TAKE ONE TABLET BY MOUTH TWICE A DAY 12/24/20   Alysa Watson MD   fluticasone (FLONASE) 50 MCG/ACT nasal spray 2 sprays by Nasal route daily 4/28/20   Alysa Watson MD   potassium chloride (KLOR-CON M) 20 MEQ extended release tablet TAKE ONE TABLET BY MOUTH DAILY 12/18/19   Alysa Watson MD   Bioflavonoid Products (BIOFLEX PO) Take by mouth    Historical Provider, MD   Cholecalciferol (VITAMIN D3) 2000 UNITS CAPS Take by mouth    Historical Provider, MD   Ascorbic Acid (VITAMIN C WITH AZRA HIPS) 500 MG tablet Take 500 mg by mouth daily. Historical Provider, MD   Multiple Vitamins-Minerals (RA VISION-DEBBY PRESERVE PO) Take  by mouth. Historical Provider, MD       Allergies:  Penicillins and Tetracyclines & related    Social History:      The patient currently lives at home. TOBACCO:   reports that she has never smoked. She has never used smokeless tobacco.  ETOH:   reports previous alcohol use. History:          Problem Relation Age of Onset    Heart Disease Mother        REVIEW OF SYSTEMS:   Pertinentpositives as noted in the HPI. All other systems reviewed and negative. PHYSICALEXAM PERFORMED:    /63   Pulse 125   Temp 97.8 °F (36.6 °C) (Oral)   Resp 18   Ht 5' (1.524 m)   Wt 181 lb 7 oz (82.3 kg)   LMP 05/20/1991   SpO2 98%   BMI 35.43 kg/m²     General appearance: In mild distress, appears stated age and cooperative. HEENT:  Normal cephalic, neck collar in place. Pupils equal, round, and reactive to light. Extra ocular muscles intact. Respiratory:  Normal respiratory effort. Clear to auscultation BL.   Cardiovascular:  Tachycardic and rhythm with normal S1/S2 without murmurs, rubs distribution. - s/p 2.5L IV fluids and 2 gram Cefepime for PNA. Plan:  - Continue IV fluids @ 100 ml/hr for 10 hours. - Start Zosyn. - Trend lactate till normal. Trending down. - F/U Blood cultures. - F/U Daily CBC, BMP.  - F/U MRSA, Strep Pneumo and Legionella Antigen. - R/U COVID.  - UA unremarkable, however Urine culture ordered on sample collected at [facility]. Concern of intra-abdominal source for sepsis  - GI liver profile remarkable for Alk Phos 417, , , Bilirubin 1.2, tenderness of RUQ and epigastric region on exam.   - Patient is s/p Cholecystectomy. F/U USG abdomen for biliary ductal dilation.   - Gastroenterology consulted. Appreciate recommendations. The patient has been seen by Dr. Brynn Rojas in the past. She is s/ p ERCP in 2020 for elevated transaminases, which revealed a duodenal ulcer that healed on repeat study. # Acute type 2 odontoid fracture 2/2 to fall or chiropractic adjustment or syncope  - CT Head, CT right hip unremarkable for acute process. - No neurological deficits. - CT cervical spine remarkable for Acute type 2 odontoid fracture with slight anterior displacement of the C1 and odontoid process in relation to the body of C2. Plan:  - Neck immobilized by a neck collar. - Neurosurgery consulted, appreciate recommendations. - q4h neuro checks. - Patient NPO.   - F/U Echo to rule out cardiac cause of possible presyncope? - Patient on tele. # Metabolic Acidosis 2/2 Lactic Acidosis likely 2/2 Sepsis vs Hypotension:  - Anion gap with lactic acidosis. LA 7.1 on arrival to the ED.   - Trending down. - Blood pressures improving with IV fluids. # NSTEMI  - Troponin 0.03 on arrival to the ED. No EKG changes other than Sinus Tachycardia. Trended troponin normal.  - F/U Echo. #Pain Management:  q6h PRN 0.5 mg Dilaudid ordered for 4 doses. 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 Follows up with Dr. Mavis Trammell at [facility]. Received first dose of new chemotherapy regimen ( Paclitaxel on 02/02/2022.    - Osteoarthritis:   - HTN: Holding home meds. - HLD: Holding statin       DVT Prophylaxis: None, F/U neurosurgery recs. Diet: Diet NPO , F/U Inpatient consult to dietician. Patient has poor PO intake.   Code Status: Full Code      I will discuss the patient with MD Sofya Colvin MD, MD  Internal Medicine Resident, PGY-1  Contact via [facility]

## 2022-02-04 NOTE — ED NOTES
Bed: 01  Expected date:   Expected time:   Means of arrival:   Comments:  critical     Elizabeth Slaughter RN  02/03/22 2025

## 2022-02-04 NOTE — PROCEDURES
INSTRUMENTAL SWALLOW REPORT  MODIFIED BARIUM SWALLOW/treat/dc    NAME: Janey Parks   : 1940  MRN: [id number]       Date of Eval: 2022     Ordering Physician: Dr Edmund Early  Radiologist: Dr. Bria Bro     Referring Diagnosis(es): Referring Diagnosis: sepsis    Past Medical History:  has a past medical history of Allergic, Anemia of chronic disease, Arthritis, Cancer ([facility] [address] Hyperlipidemia, Hypertension, Obesity, Pre-diabetes, and S/P colonoscopy. Past Surgical History:  has a past surgical history that includes Cholecystectomy; Refractive surgery (Bilateral); Appendectomy; Tonsillectomy; bronchoscopy (N/A, 2020); bronchoscopy (N/A, 2020); bronchoscopy (N/A, 2020); bronchoscopy (2020); and bronchoscopy (N/A, 2020). Current Diet Solid Consistency: NPO  Current Diet Liquid Consistency: NPO  Type of Study: Repeat MBS     Patient Complaints/Reason for Referral:  Janey Parks was referred for a MBS to assess the efficiency of his/her swallow function, assess for aspiration, and to make recommendations regarding safe dietary consistencies, effective compensatory strategies, and safe eating environment.     Onset of problem:   Date of Onset: 22      CT head 2/3/22  No acute intracranial abnormality.       Mild generalized atrophy and chronic ischemic changes.            CT of Chest 2322  Extensive consolidative changes throughout the right lower lobe and part of   right middle lobe are likely related to tumor infiltration as the bronchi   within this area appears to be infiltrated by soft tissue lesion.       Scattered areas of peripheral consolidation within the there are multiple   areas of consolidation throughout the right left upper lobe right upper lobe   and left lower lobe which have the appearance of the in   infectious/inflammatory condition based on their distribution.       Small left pleural effusion.       Innumerable ill-defined hypodense lesions are noted throughout the liver near   completely infiltrating the liver parenchyma concerning for diffuse   metastatic disease.       2 mm nonobstructing stone of midpole of right kidney.       No fracture.         Behavior/Cognition/Vision/Hearing:  Behavior/Cognition: Alert; Cooperative;Pleasant mood  Vision: Within Functional Limits  Hearing: Within functional limits    Impressions:  Pt with mild oropharyngeal dysphagia. Swallow is functional.  Pt demonstrated mildly prolonged mastication with solid texture, though clears oral cavity. Decreased tongue base retraction/mildly reduced hyo-laryngeal mechanics, resulted in mild residue in valleculae and pyriform sinus cavities. Pt demonstrates reflexive dry swallow, but required cues to take another dry swallow and liquid wash to clear fully. Single instance of transient penetration occurred with thin liquid via cup. Multiple additional trials via cup and straw provided with no penetration or aspiration identified. Pt demonstrates good understanding of education provided. Treatment Dx and ICD 10: dysphagia   Patient Position: Lateral and Patient Degrees: 90  Consistencies Administered: Reg solid; Dysphagia Pureed (Dysphagia I); Thin straw; Thin teaspoon; Thin cup  Dysphagia Outcome Severity Scale: Level 6: Within functional limits/Modified independence  Penetration-Aspiration Scale (PAS): 2 - Material enters the airway, remains above the vocal folds, and is ejected from the airway (x 1, cleared fully)    Recommended Diet:  Solid consistency: Easy to Chew (Pt preference)  Liquid consistency: Thin  Liquid administration via: Cup;Straw    Medication administration: PO    Safe Swallow Protocol:  Supervision: Distant  Upright as possible for all oral intake; Alternate solids and liquids; Remain upright for 30-45 minutes after meals;Swallow 2 times per bite/sip    Recommendations/Treatment  Requires SLP Intervention: no  D/C Recommendations:  (no follow up indicated)    Recommended Exercises: n/a   Therapeutic Interventions: Patient/Family education    Education: Images and recommendations were reviewed with pt following this exam.   Patient Education: Pt educated to results of MBS  Patient Education Response: Verbalizes understanding    Prognosis  Prognosis for safe diet advancement: good  Duration/Frequency of Treatment  Duration/Frequency of Treatment: no follow up indicated      Goals:    1-The patient/caregiver will demonstrate understanding of compensatory strategies for improved swallowing safety  2/4: Educated pt to purpose of visit, s/s of aspiration, concern if aspiration occurs, rationale for MBS study. Pt stated comprehension and agreeable  Cont goal   2/4: pt educated to the anatomy/physiology of swallow, through playback of video. Educated to results, diet recommendations and strategies to improve safety of swallow. Pt jackie to restate information accurately and states good understanding of information. Goal met  2-The patient will tolerate instrumental swallowing procedure  2/4: goal met    Oral Preparation / Oral Phase   Swallow is functional.  Pt demonstrated mildly prolonged mastication with solid texture, though clears oral cavity. Pharyngeal Phase  Decreased tongue base retraction/mildly reduced hyo-laryngeal mechanics, resulted in mild residue in valleculae and pyriform sinus cavities. Pt demonstrates reflexive dry swallow, but required cues to take another dry swallow and liquid wash to clear fully. Single instance of transient penetration occurred with thin liquid via cup.  Multiple additional trials via cup and straw provided with no penetration or aspiration identified    Esophageal Phase  Clears UES adequately    Pain   Patient Currently in Pain: Denies    Therapy Time:   Individual Concurrent Group Co-treatment   Time In 1245         Time Out 1310         Minutes 25            Plan:  Diet recommendation: Easy to Chew/thin liquids  Dc recommendation: no follow up indicated. Bryce Lemus M.S./[facility]-SLP #[id number]  Pg. # [id number]  Needs met prior to leaving dept.  Results communicated to RN.    2/4/2022, 1:11 PM

## 2022-02-04 NOTE — CONSULTS
[facility]  Palliative Medicine Consultation Note      Date Of Admission:2/4/2022  Date of consult: 02/04/22  Seen by [facility] in the past:  No    Recommendations:        Reviewed pt's HCPOA on chart which names her brother Danelle Szymanski as [facility] agent [facility]. Met with pt and got her brother/LARISA Szymanski on speaker phone. Introduced palliative care and had a voluntary discussion about advance care planning. Pt and Danelle Szymanski report that she has outlived her original prognosis of 6-12 months when she was diagnosed with stage IV lung cancer 8/2020. She has been driving herself to chemo appointments, going out to lunch, living independently, and overall enjoying a good quality of life despite her age and cancer. She was recently started on a new chemo per Danelle Szymanski, due to CT findings that her cancer had progressed recently. Discussed code status with Danelle Block, and the pt had fallen asleep by that point. Danelle Szymanski said the pt had told him at the time of diagnosis that she did not want resuscitative measures, however when she was admitted to the hospital she said she would want to be full code. I offered to wake pt up to have further discussion about this, but Danelle Szymanski declined to do that right now. He says he will speak with her about it at a later time. He hopes to come in person if her covid test comes back negative. 1. Goals of Care/Advanced Care planning/Code status: Full Code, discussed with pt's brother/LARISA Szymanski today. He didn't want to speak with the pt about her wishes today. Pt and family want to continue aggressive care in hopes that she can resume treatment with her oncologist. They are considering requesting a transfer to [facility] where she gets most of her care. 2. Pain: pt denies pain. She has an odontoid fracture and is in a C collar. She has dilaudid available prn and has received one dose early this morning.    3. SOB: denies sob and is breathing comfortably on 4 L oxygen. She's being treated for sepsis secondary to possible postobstructive pneumonia. She has stage IV small cell lung cancer with CT showing extensive consolidative changes likely related to tumor infiltration. 4. Disposition: TBD, possible transfer to [facility]    Reason for Consult:         [x]  Goals of Care  [x]  Code Status Discussion/Advanced Care Planning   []  Psychosocial/Family Support  []  Symptom Management  []  Other (Specify)    Requesting Physician: Dr. Leighann Morales:  Fall    History Obtained From:  patient, family member - brother, electronic medical record    History of Present Illness:         Дмитрий Fletcher is a 79 y.o. female with PMH of small cell lung cancer on chemotherapy, HTN, HLD who presented with a fall. She was found after being down for 2.5 hours on the floor. She denied losing consciousness or hitting her head. She was A&Ox4 on arrival, hypotensive, tachycardic, LC 7.1, WBC 20.4. CT head was unremarkable for acute process, CT cervical spine remarkable for Acute type 2 odontoid fracture with slight anterior displacement of the C1 and odontoid process in relation to the body of C2. CT Scan of the right hip was unremarkable. CT Scan of chest remarkable for extensive consolidative changes likely related to tumor infiltration. Scattered areas of consolidation in BL lobes were suspicious for an infectious/inflammatory distribution. She was placed in a C collar and admitted with sepsis possibly secondary to post-obstructive pneumonia secondary to small cell lung cancer or abdominal infection. She had a rapid response this morning due to tachycardia to 150s, which was attributed to sepsis and volume depletion. She was given 1 L IVF bolus.     Subjective:         Past Medical History:        Diagnosis Date    Allergic     Anemia of chronic disease 3/10/2021    Arthritis     Cancer ([facility] [address]     Hyperlipidemia     Hypertension     Obesity     Pre-diabetes     S/P colonoscopy 1990       Past Surgical History:        Procedure Laterality Date    APPENDECTOMY      BRONCHOSCOPY N/A 6/18/2020    BRONCHOSCOPY ALVEOLAR LAVAGE performed by Jennifer Ag MD at [place] [address] N/A 6/18/2020    BRONCHOSCOPY BRUSHINGS performed by Jennifer Ag MD at [address] [place] [address] 6/18/2020    BRONCHOSCOPY BIOPSY BRONCHUS performed by Jennifer Ag MD at [facility] [address]  6/18/2020    BRONCHOSCOPY W/EBUS FNA performed by Jennifer Ag MD at [facility] [address] N/A 6/18/2020    BRONCHOSCOPY ENDOBRONCHIAL ULTRASOUND performed by Jennifer Ag MD at [address] [place] [address] Bilateral     TONSILLECTOMY         Current Medications:    Medications Prior to Admission: lisinopril (PRINIVIL;ZESTRIL) 40 MG tablet, TAKE ONE TABLET BY MOUTH DAILY  simvastatin (ZOCOR) 20 MG tablet, TAKE ONE TABLET BY MOUTH AT BEDTIME  potassium chloride 20 MEQ/15ML (10%) oral solution, TAKE 15MLS BY MOUTH DAILY  magnesium oxide (MAG-OX) 400 MG tablet, TAKE ONE TABLET BY MOUTH DAILY  metoprolol tartrate (LOPRESSOR) 25 MG tablet, TAKE ONE TABLET BY MOUTH TWICE A DAY  Cholecalciferol (VITAMIN D3) 2000 UNITS CAPS, Take by mouth  [DISCONTINUED] famotidine (PEPCID) 20 MG tablet, TAKE ONE TABLET BY MOUTH TWICE A DAY  [DISCONTINUED] chlorthalidone (HYGROTON) 25 MG tablet, TAKE ONE TABLET BY MOUTH DAILY  [DISCONTINUED] ursodiol (ACTIGALL) 300 MG capsule, Take 300 mg by mouth 2 times daily  verapamil (CALAN SR) 180 MG extended release tablet, TAKE ONE TABLET BY MOUTH TWICE A DAY  [DISCONTINUED] fluticasone (FLONASE) 50 MCG/ACT nasal spray, 2 sprays by Nasal route daily  potassium chloride (KLOR-CON M) 20 MEQ extended release tablet, TAKE ONE TABLET BY MOUTH DAILY  [DISCONTINUED] Bioflavonoid Products (BIOFLEX PO), Take by mouth  [DISCONTINUED] Ascorbic Acid (VITAMIN C WITH AZRA HIPS) 500 MG tablet, Take 500 mg by mouth daily. [DISCONTINUED] Multiple Vitamins-Minerals (RA VISION-DEBBY PRESERVE PO), Take  by mouth. Allergies:  Penicillins and Tetracyclines & related    Social History:    · TOBACCO: reports that she has never smoked. She has never used smokeless tobacco.  · ETOH:   reports previous alcohol use. · Patient currently lives alone    Review of Systems -   Review of Systems   Constitutional: Positive for fatigue. HENT: Negative. Respiratory: Negative. Cardiovascular: Negative. Gastrointestinal: Negative. Genitourinary: Negative. Musculoskeletal:        Multiple falls   Skin: Negative. Neurological: Positive for weakness. Psychiatric/Behavioral: Negative.    :     Objective:        Physical Exam  Constitutional:       General: She is not in acute distress. HENT:      Head: Normocephalic and atraumatic. Cardiovascular:      Rate and Rhythm: Regular rhythm. Tachycardia present. Pulmonary:      Effort: Pulmonary effort is normal.      Breath sounds: Normal breath sounds. Abdominal:      General: Bowel sounds are normal.      Palpations: Abdomen is soft. Musculoskeletal:         General: No swelling. Skin:     General: Skin is warm and dry. Neurological:      Mental Status: She is alert and oriented to person, place, and time. Comments: Pt is somewhat lethargic, but awakens easily and is oriented x4, with good insight into her medical problems, but mixes words up at times. Palliative Performance Scale:  [] 60% Ambulation reduced; Significant disease; Can't do hobbies/housework; intake normal or reduced; occasional assist; LOC full/confusion  [x] 50% Mainly sit/lie; Extensive disease; Can't do any work; Considerable assist; intake normal  Or reduced; LOC full/confusion  [] 40% Mainly in bed; Extensive disease; Mainly assist; intake normal or reduced; occasional assist; LOC full/confusion  [] 30% Bed Bound; Extensive disease;  Total care; intake reduced; LOC full/confusion  [] 20% Bed Bound; Extensive disease; Total care; intake minimal; Drowsy/coma  [] 10% Bed Bound; Extensive disease; Total care; Mouth care only; Drowsy/coma  [] 0% Death      Vitals:    /75   Pulse 102   Temp 97.9 °F (36.6 °C) (Oral)   Resp 20   Ht 5' (1.524 m)   Wt 181 lb 7 oz (82.3 kg)   LMP 05/20/1991   SpO2 98%   BMI 35.43 kg/m²     Labs:    BMP:   Recent Labs     02/03/22 2056 02/04/22  0632 02/04/22  1058   * 132* 132*   K 4.3 4.4 4.3   CL 90* 95* 97*   CO2 22 28 27   BUN 29* 28* 25*   CREATININE 0.9 0.7 <0.5*   GLUCOSE 102* 103* 94     CBC:   Recent Labs     02/03/22 2056 02/04/22  0632 02/04/22  1058   WBC 20.4* 17.4* 17.0*   HGB 10.8* 9.2* 9.5*   HCT 33.8* 28.0* 28.7*    149 139       LFT's:   Recent Labs     02/03/22 2056 02/04/22  1058   * 1,276*   * 438*   BILITOT 1.2* 1.3*   ALKPHOS 417* 332*     Troponin:   Recent Labs     02/03/22 2056 02/04/22  0324 02/04/22  1058   TROPONINI 0.03* <0.01 <0.01     BNP: No results for input(s): BNP in the last 72 hours. ABGs: No results for input(s): PHART, WTO6JJI, PO2ART in the last 72 hours. INR:   Recent Labs     02/03/22 2056   INR 1.90*       U/A:  Recent Labs     02/03/22  2355   COLORU YELLOW   PHUR 6.0   WBCUA 6-9   RBCUA 0-2   CLARITYU Clear   SPECGRAV >1.030   LEUKOCYTESUR Negative   UROBILINOGEN 1.0   BILIRUBINUR Negative   BLOODU Negative   GLUCOSEU Negative       FL MODIFIED BARIUM SWALLOW W VIDEO   Final Result      No aspiration. Please correlate with the speech pathology report for additional details.       MRI CERVICAL SPINE WO CONTRAST    (Results Pending)         Conclusion/Time spent:         Recommendations see above    Pt [id number]  Time spent with patient and/or family: 25 min  Time reviewing records: 10 min   Time communicating with staff: 5 min     A total of 40 minutes spent with the patient and family on unit greater than 50% in counseling regarding palliative care and in goals of care for the patient. Thank you to Dr. Kaia Cantrell for this consultation. We will continue to follow Ms. Nicholas's care as needed.     Umberto Coronel NP  [address] [place] [address]

## 2022-02-04 NOTE — PROGRESS NOTES
received a 2.5L IV fluids and Cefepime in the ED and was transferred to [facility]. for further management.      Of note, the patient sees a chiropractor regularly and saw him after a fall in January for head and neck adjustments and followed up, the last adjustment being on the 1st of February. She reported decreased ROM at neck that prevented her from lifting her head up. ROS of system positive for dizziness earlier this year and neck pain. It was negative for fevers, chills, chest pain, SOB, cough, nausea, vomiting, diarrhea or urinary complaints. She did not have any neurological deficits on exam.     Medications:     Scheduled Meds:   [Held by provider] atorvastatin  10 mg Oral Daily    [Held by provider] chlorthalidone  25 mg Oral Daily    [Held by provider] ursodiol  300 mg Oral BID    [Held by provider] verapamil  180 mg Oral BID    sodium chloride flush  5-40 mL IntraVENous 2 times per day    piperacillin-tazobactam  4,500 mg IntraVENous Q8H    metoprolol tartrate  25 mg Oral BID    linezolid  600 mg IntraVENous Q12H    methocarbamol IVPB  1,000 mg IntraVENous Q8H    Followed by   Socorro Samson ON 2/5/2022] methocarbamol  750 mg Oral Q6H     Continuous Infusions:   sodium chloride 25 mL (02/04/22 0639)    dextrose      dextrose       PRN Meds:sodium chloride flush, sodium chloride, acetaminophen **OR** acetaminophen, perflutren lipid microspheres, glucose, dextrose, glucagon (rDNA), dextrose, HYDROmorphone, LORazepam    Objective:   Vitals:   T-max:  Patient Vitals for the past 8 hrs:   BP Temp Temp src Pulse Resp SpO2   02/04/22 1132 131/75 97.9 °F (36.6 °C) Oral  20 98 %   02/04/22 0847 (!) 141/78 97.9 °F (36.6 °C) Oral 102 18 98 %   02/04/22 0842      98 %     No intake or output data in the 24 hours ending 02/04/22 1533    Review of Systems   Constitutional: Negative for appetite change, chills and fever. HENT: Negative for congestion, hearing loss, rhinorrhea and sore throat. Eyes: Negative for visual disturbance. Respiratory: Negative for cough, chest tightness, shortness of breath and wheezing. Cardiovascular: Negative for chest pain, palpitations and leg swelling. Gastrointestinal: Negative for abdominal distention, abdominal pain, blood in stool, constipation, diarrhea, nausea and vomiting. Endocrine: Negative for polyuria. Genitourinary: Negative for difficulty urinating and dysuria. Musculoskeletal: Positive for arthralgias (right hip, left knee). Negative for myalgias. Skin: Negative for color change, pallor and rash. Neurological: Negative for dizziness, seizures, syncope, facial asymmetry, weakness, light-headedness and headaches. Psychiatric/Behavioral: Negative for behavioral problems, confusion and hallucinations. Physical Exam  Constitutional:       General: She is not in acute distress. Appearance: Normal appearance. She is not ill-appearing, toxic-appearing or diaphoretic. HENT:      Head: Normocephalic and atraumatic. Right Ear: External ear normal.      Left Ear: External ear normal.      Nose: Nose normal. No congestion or rhinorrhea. Mouth/Throat:      Mouth: Mucous membranes are moist.      Pharynx: Oropharynx is clear. Eyes:      Extraocular Movements: Extraocular movements intact. Conjunctiva/sclera: Conjunctivae normal.      Pupils: Pupils are equal, round, and reactive to light. Neck:      Comments: Cervical collar in place  Cardiovascular:      Rate and Rhythm: Tachycardia present. Rhythm irregular. Pulses: Normal pulses. Heart sounds: Normal heart sounds. No friction rub. No gallop. Pulmonary:      Effort: Pulmonary effort is normal. No respiratory distress. Breath sounds: Normal breath sounds. No wheezing or rales. Abdominal:      General: Abdomen is flat. Bowel sounds are normal. There is no distension. Palpations: Abdomen is soft. There is no mass. Tenderness:  There is no abdominal tenderness. There is no guarding or rebound. Hernia: No hernia is present. Musculoskeletal:         General: No swelling, tenderness, deformity or signs of injury. Normal range of motion. Skin:     General: Skin is warm and dry. Capillary Refill: Capillary refill takes less than 2 seconds. Coloration: Skin is not jaundiced or pale. Neurological:      General: No focal deficit present. Mental Status: She is alert and oriented to person, place, and time. Cranial Nerves: No cranial nerve deficit. Psychiatric:         Mood and Affect: Mood normal.         Behavior: Behavior normal.         Thought Content: Thought content normal.         Judgment: Judgment normal.         LABS:    CBC:   Recent Labs     02/03/22 2056 02/04/22 0632 02/04/22  1058   WBC 20.4* 17.4* 17.0*   HGB 10.8* 9.2* 9.5*   HCT 33.8* 28.0* 28.7*    149 139   MCV 92.8 92.1 91.9     Renal:    Recent Labs     02/03/22 2056 02/04/22  0632 02/04/22  1058   * 132* 132*   K 4.3 4.4 4.3   CL 90* 95* 97*   CO2 22 28 27   BUN 29* 28* 25*   CREATININE 0.9 0.7 <0.5*   GLUCOSE 102* 103* 94   CALCIUM 8.6 7.7* 7.7*   MG  --   --  1.70*   ANIONGAP 19* 9 8     Hepatic:   Recent Labs     02/03/22 2056 02/04/22  1058   * 1,276*   * 438*   BILITOT 1.2* 1.3*   BILIDIR  --  1.0*   PROT 6.0* 5.1*   LABALBU 2.6* 2.2*   ALKPHOS 417* 332*     Troponin:   Recent Labs     02/03/22 2056 02/04/22  0324 02/04/22  1058   TROPONINI 0.03* <0.01 <0.01     Lactate: 7.1 -> 4.3 -> 2.2 -> 1.4 today      Cultures: rapid influenza A & B negative; COVID-19, MRSA probe, UCx, BCx x2 pending    -----------------------------------------------------------------      RAD:   MRI CERVICAL SPINE WO CONTRAST   Final Result   1. Acute type II odontoid fracture with diffuse marrow edema of the odontoid and no evidence of separation. 2. Ligamentous complex of the odontoid remains intact.       3. Prevertebral soft tissue edema from  C2  through C4 with intact anterior longitudinal ligament      4. Mild edema of the interspinous ligament of C1-C2      5. Edematous changes of the posterior posterior atlantooccipital ligament along the periosteum      5. Traumatic injury of the nuchal ligament from the occipital insertion through the C2-3 C3 level with loss of the normal signal and the midline, torn nuchal ligament. 6. The posterior longitudinal ligament and posterior spinal laminal line remain intact. 7. Degenerative disc space narrowing, multilevel with reversal of curvature. , Spondylosis         FL MODIFIED BARIUM SWALLOW W VIDEO   Final Result      No aspiration. Please correlate with the speech pathology report for additional details. Assessment/Plan:   Carrie Hector a 79 y. o. female w/ PMHX of small cell lung cancer on chemotherapy, HTN, HLD presented to the [facility] after a fall at home.     Traumatic C1 fracture  Fall   Odontoid type IIs C1 fracture. Total .  - NSGY consulted, recs appreciated  - Spine precautions  - Cervical collar  - Q4H neurochecks  - Pain management with dilaudid     Severe sepsis likely 2/2 postobstructive PNA in setting of lung cancer   POA, leukocytosis to 20, elevated LA to 7.1.   - Zosyn  - IVF  - Supplemental O2, wean as tolerated  - F/u respiratory cultures, pro-robbin, COVID-19 PCR     New-onset supraventricular narrow-complex tachycardia  As seen on EKG at bedside today, rate up to 180's. Likely MAT secondary to lung cancer.  - TSH level  - May consider ECHO at some point  - Given 5mg metoprolol IV  - Restarted home metoprolol 25mg PO BID  - Continuous telemetry     Immunosuppression  Likely 2/2 chemotherapy. - Monitor for signs & symptoms of infection, currently on Zosyn  - Add Linezolid for coverage     Chronic respiratory failure  Home oxygen requirement of 2L, placed on 4L in the ED, saturating in the high-90's.    - Supplemental oxygen, wean as tolerated     Metastatic lung cancer to liver  Elevated LFTs  - Hepatitis panel  - LFTs  - GI consulted     Chronic normocytic anemia   2/2 malignancy, chemotherapy. Hgb 9.2 POA.   - Daily CBC     Troponinemia  Tn 0.03 -> <0.01.  - Continuous telemetry    HTN  - Restart home verapamil 180mg BID today once BP permits     Code Status: Full code  FEN: NPO except sips of water with meds, NS @ 100mL/h  PPX: SCDs  DISPO: IP    Elmer Vann MD, PGY-1  02/04/22  3:33 PM    This patient has been staffed and discussed with Desiree Borja MD.

## 2022-02-04 NOTE — PROGRESS NOTES
Pharmacy Note    Piperacillin/Tazobactam 3.75 gm every 8 hours ordered for treatment of Sepsis. Per [facility] Extended Infusion Beta-Lactam Policy, order will be changed to 4500 mg every 8 hours over 4 hours. Estimated Creatinine Clearance: Estimated Creatinine Clearance: 46 mL/min (based on SCr of 0.9 mg/dL). Dialysis Status, GEORGIE, CKD:    Rationale for Adjustment: Agent demonstrates time-dependent effect on bacterial eradication. Extended-infusion dosing strategy aims to enhance microbiologic and clinical efficacy. Pharmacy will continue to monitor cultures and sensitivities (where available) and adjust dose as necessary. Please call with any questions. Thanks!   German Perez, PharmD, BCPS

## 2022-02-04 NOTE — ED PROVIDER NOTES
[address] [facility] PROVIDER NOTE    Patient Identification  Pt Name: Raoul Grove  MRN: [id number]  Clarence 1940  Date of evaluation: 2/3/2022  Provider: Kaveh Alfaro MD  PCP: Alysa Watson MD    Chief Complaint  Fall (pt fell this morning on ground for about 2 hours without oxygen, normally wears 2 LPM, on NRB when squad brought in at 95%, pt with right hip pain and new tears noted to sacral area and right elbow)      HPI  History provided by patient   This is a 79 y.o. female who presents to the ED for fall. States that it was preceded by dizziness. She states that she gets this dizziness often. Caused her to fall today. She typically uses a walker. She lives at home by herself. She was on the ground for actually 2-1/2 hours contrary to what triage note states. She endorses right hip pain. She has no pain anywhere else except for her neck which she states has been painful for the past 2 to 3 weeks. She states that she is scheduled to get an MRI of her neck later. No loss of consciousness. No recent fever or illness. Has been tolerating oral intake. No chest pain or abdominal pain    ROS  12 systems reviewed, pertinent positives/negatives per HPI otherwise noted to be negative.     I have reviewed the following nursing documentation:  Allergies: Penicillins and Tetracyclines & related    Past medical history:   Past Medical History:   Diagnosis Date    Allergic     Anemia of chronic disease 3/10/2021    Arthritis     Cancer ([facility] [address]     Hyperlipidemia     Hypertension     Obesity     Pre-diabetes     S/P colonoscopy 1990     Past surgical history:   Past Surgical History:   Procedure Laterality Date    APPENDECTOMY      BRONCHOSCOPY N/A 6/18/2020    BRONCHOSCOPY ALVEOLAR LAVAGE performed by Hung Terrazas MD at [address] [place] [address] 6/18/2020    BRONCHOSCOPY BRUSHINGS performed by Hung Terrazas MD at [address] [place] [address] 6/18/2020    BRONCHOSCOPY BIOPSY BRONCHUS performed by Rachel Malin MD at [address]  6/18/2020    BRONCHOSCOPY W/EBUS FNA performed by Rachel Malin MD at [address] N/A 6/18/2020    BRONCHOSCOPY ENDOBRONCHIAL ULTRASOUND performed by Rachel Malin MD at [address] [facility] Bilateral     TONSILLECTOMY         Home medications:   Previous Medications    ASCORBIC ACID (VITAMIN C WITH AZRA HIPS) 500 MG TABLET    Take 500 mg by mouth daily. BIOFLAVONOID PRODUCTS (BIOFLEX PO)    Take by mouth    CHLORTHALIDONE (HYGROTON) 25 MG TABLET    TAKE ONE TABLET BY MOUTH DAILY    CHOLECALCIFEROL (VITAMIN D3) 2000 UNITS CAPS    Take by mouth    FAMOTIDINE (PEPCID) 20 MG TABLET    TAKE ONE TABLET BY MOUTH TWICE A DAY    FLUTICASONE (FLONASE) 50 MCG/ACT NASAL SPRAY    2 sprays by Nasal route daily    LISINOPRIL (PRINIVIL;ZESTRIL) 40 MG TABLET    TAKE ONE TABLET BY MOUTH DAILY    MAGNESIUM OXIDE (MAG-OX) 400 MG TABLET    TAKE ONE TABLET BY MOUTH DAILY    METOPROLOL TARTRATE (LOPRESSOR) 25 MG TABLET    TAKE ONE TABLET BY MOUTH TWICE A DAY    MULTIPLE VITAMINS-MINERALS (RA VISION-DEBBY PRESERVE PO)    Take  by mouth. POTASSIUM CHLORIDE (KLOR-CON M) 20 MEQ EXTENDED RELEASE TABLET    TAKE ONE TABLET BY MOUTH DAILY    POTASSIUM CHLORIDE 20 MEQ/15ML (10%) ORAL SOLUTION    TAKE 15MLS BY MOUTH DAILY    SIMVASTATIN (ZOCOR) 20 MG TABLET    TAKE ONE TABLET BY MOUTH AT BEDTIME    URSODIOL (ACTIGALL) 300 MG CAPSULE    Take 300 mg by mouth 2 times daily    VERAPAMIL (CALAN SR) 180 MG EXTENDED RELEASE TABLET    TAKE ONE TABLET BY MOUTH TWICE A DAY       Social history:  reports that she has never smoked. She has never used smokeless tobacco. She reports previous alcohol use. She reports that she does not use drugs.     Family history:    Family History   Problem Relation Age of Onset    Heart Disease Mother          Exam  ED Triage Vitals [02/03/22 2035]   BP Temp Temp Source Pulse Resp SpO2 Height Weight   108/63 98.2 °F (36.8 °C) Oral 163 18 91 % 5' (1.524 m) 181 lb (82.1 kg)     Nursing note and vitals reviewed. Constitutional: In no acute distress  HENT:      Head: Normocephalic      Ears: External ears normal.      Nose: Nose normal.     Mouth: Membrane mucosa moist   Eyes: No discharge. Neck: Supple. Trachea midline. Cardiovascular: Regular rate. Warm extremities  Pulmonary/Chest: Effort normal. No respiratory distress. Abdominal: Soft. No distension. Nontender  : Deferred  Rectal: Deferred   Musculoskeletal: Moves all extremities. No gross deformity. No tenderness to palpation over bilateral arms, chest, abdomen, left hip, bilateral lower extremities including axial loading. There is mild tenderness to palpation of the right iliac crest   neurological: Alert and oriented. Face symmetric. Speech is clear. Normal sensation light palpation bilateral upper and lower extremities. Skin: Warm and dry. Small skin tear over the sacrum and right elbow  Psychiatric: Normal mood and affect. Behavior is normal.    Procedures      EKG  The Ekg interpreted by me in the absence of a cardiologist shows. Tachy sinus rhythm. No specific ST changes appreciated.   New tachycardia compared to 6/20 study      Radiology  CT CHEST ABDOMEN PELVIS W CONTRAST   Final Result   Extensive consolidative changes throughout the right lower lobe and part of   right middle lobe are likely related to tumor infiltration as the bronchi   within this area appears to be infiltrated by soft tissue lesion. Scattered areas of peripheral consolidation within the there are multiple   areas of consolidation throughout the right left upper lobe right upper lobe   and left lower lobe which have the appearance of the in   infectious/inflammatory condition based on their distribution. Small left pleural effusion.       Innumerable ill-defined hypodense lesions are noted throughout the liver near   completely infiltrating the liver parenchyma concerning for diffuse   metastatic disease. 2 mm nonobstructing stone of midpole of right kidney. No fracture. RECOMMENDATIONS:   Unavailable         CT THORACIC SPINE TRAUMA RECONSTRUCTION   Final Result   Osteopenia and mild general degenerative changes but no acute fracture or   subluxation at the thoracic spine. CT HIP RIGHT WO CONTRAST   Preliminary Result   No acute fracture or dislocation of the right hip. Mild right hip   osteoarthritis. CT LUMBAR SPINE TRAUMA RECONSTRUCTION   Final Result   Osteopenia and severe degenerative changes. No acute fracture or subluxation   at the lumbar spine. CT Cervical Spine WO Contrast   Final Result   Acute type 2 odontoid fracture with slight anterior displacement of C1 and   the odontoid process in relation to the body of C2. Critical results were called by Dr. Vince Fuchs. MD Maribel to Dr. Celeste De La Torre on   2/3/2022 at 22:26. CT Head WO Contrast   Final Result   No acute intracranial abnormality. Mild generalized atrophy and chronic ischemic changes.              Labs  Results for orders placed or performed during the hospital encounter of 02/03/22   Rapid influenza A/B antigens    Specimen: Nasopharyngeal   Result Value Ref Range    Rapid Influenza A Ag Negative Negative    Rapid Influenza B Ag Negative Negative   CBC Auto Differential   Result Value Ref Range    WBC 20.4 (H) 4.0 - 11.0 K/uL    RBC 3.64 (L) 4.00 - 5.20 M/uL    Hemoglobin 10.8 (L) 12.0 - 16.0 g/dL    Hematocrit 33.8 (L) 36.0 - 48.0 %    MCV 92.8 80.0 - 100.0 fL    MCH 29.7 26.0 - 34.0 pg    MCHC 32.0 31.0 - 36.0 g/dL    RDW 19.8 (H) 12.4 - 15.4 %    Platelets 863 114 - 028 K/uL    MPV 8.9 5.0 - 10.5 fL    Neutrophils % 96.8 %    Lymphocytes % 1.1 %    Monocytes % 1.7 %    Eosinophils % 0.0 %    Basophils % 0.4 %    Neutrophils Absolute 19.8 (H) 1.7 - 7.7 K/uL Lymphocytes Absolute 0.2 (L) 1.0 - 5.1 K/uL    Monocytes Absolute 0.3 0.0 - 1.3 K/uL    Eosinophils Absolute 0.0 0.0 - 0.6 K/uL    Basophils Absolute 0.1 0.0 - 0.2 K/uL   Comprehensive Metabolic Panel w/ Reflex to MG   Result Value Ref Range    Sodium 131 (L) 136 - 145 mmol/L    Potassium reflex Magnesium 4.3 3.5 - 5.1 mmol/L    Chloride 90 (L) 99 - 110 mmol/L    CO2 22 21 - 32 mmol/L    Anion Gap 19 (H) 3 - 16    Glucose 102 (H) 70 - 99 mg/dL    BUN 29 (H) 7 - 20 mg/dL    CREATININE 0.9 0.6 - 1.2 mg/dL    GFR Non-African American 60 (A) >60    GFR African American >60 >60    Calcium 8.6 8.3 - 10.6 mg/dL    Total Protein 6.0 (L) 6.4 - 8.2 g/dL    Albumin 2.6 (L) 3.4 - 5.0 g/dL    Albumin/Globulin Ratio 0.8 (L) 1.1 - 2.2    Total Bilirubin 1.2 (H) 0.0 - 1.0 mg/dL    Alkaline Phosphatase 417 (H) 40 - 129 U/L     (H) 10 - 40 U/L     (H) 15 - 37 U/L   Troponin   Result Value Ref Range    Troponin 0.03 (H) <0.01 ng/mL   Protime-INR   Result Value Ref Range    Protime 22.1 (H) 9.9 - 12.7 sec    INR 1.90 (H) 0.88 - 1.12   Lactate, Sepsis   Result Value Ref Range    Lactic Acid, Sepsis 7.1 (HH) 0.4 - 1.9 mmol/L   Lactate, Sepsis   Result Value Ref Range    Lactic Acid, Sepsis 4.3 (HH) 0.4 - 1.9 mmol/L   Blood Gas, Venous   Result Value Ref Range    pH, Teodoro 7.422 7.350 - 7.450    pCO2, Teodoro 39.0 (L) 40.0 - 50.0 mmHg    pO2, Teodoro 69.8 (H) 25.0 - 40.0 mmHg    HCO3, Venous 25.4 23.0 - 29.0 mmol/L    Base Excess, Teodoro 1.0 -3.0 - 3.0 mmol/L    O2 Sat, Teodoro 94 Not Established %    Carboxyhemoglobin 4.2 (H) 0.0 - 1.5 %    MetHgb, Teodoro 0.0 <1.5 %    TC02 (Calc), Teodoro 60 Not Established mmol/L    O2 Content, Teodoro 14 Not Established VOL %    O2 Therapy Unknown     Hemoglobin, Teodoro, Reduced 6 %   CK   Result Value Ref Range    Total  26 - 192 U/L   EKG 12 Lead   Result Value Ref Range    Ventricular Rate 159 BPM    Atrial Rate 159 BPM    P-R Interval 126 ms    QRS Duration 62 ms    Q-T Interval 314 ms    QTc Calculation (Iain) 510 ms    P Axis 20 degrees    R Axis -28 degrees    T Axis 35 degrees    Diagnosis       Sinus tachycardiaAnteroseptal infarct , age undeterminedAbnormal ECG   TYPE AND SCREEN   Result Value Ref Range    ABO/Rh A POS     Antibody Screen NEG        Screenings   [place] Coma Scale  Eye Opening: Spontaneous  Best Verbal Response: Oriented  Best Motor Response: Obeys commands  [place] Coma Scale Score: 15       MDM and ED Course  This is a 79 y.o. female who presents to the ED for fall with preceding dizziness. States that she has had this dizziness on and off frequently therefore less likely to be due to posterior circulation infarct. No longer has dizziness. Now has pain in her right hip. Obtaining CT head to evaluate for intracranial bleed as well as abdomen pelvis, L-spine, right hip. While she does have a small skin tear on her right elbow she has no tenderness in the area. She appears significantly dehydrated and is tachycardic to 160. Starting off with IV fluids. Patient has leukocytosis, lactic acidosis, tachycardia, hypotenison. Giving empiric abx for sepsis. She is immunocompromised; is receiving chemo for lung cancer with mets to liver. Has received ceftin in the past therefore will use cefepime. Source likely pneumonia based on CT scan, although she is not having cough/fever. Troponin elevated. No chest pain or dyspnea. Ekg no ischemic changes. May be rate related. Received a call at about 1025p regarding type 2 odontoid fracture, mimimally displaced. Likely ongoing 2 wks. Placed in cervical collar. No focal neuro deficits at this time. Will consult neurosurgery at [facility] for transfer. Spoke with dr Eamon Baker at  who agreed with transfer to [address] [place] [address] with dr Lulu Flores at 1110p who will be accepting the patient. The total critical care time spent while evaluating and treating this patient was at least 32 minutes.   This excludes time spent doing separately billable procedures. This includes time at the bedside, data interpretation, medication management, obtaining critical history from collateral sources if the patient is unable to provide it directly, and physician consultation. Specifics of interventions taken and potentially life-threatening diagnostic considerations are listed above in the medical decision making. [unfilled]    Final Impression  1. Fall, initial encounter    2. Closed odontoid fracture, initial encounter ([facility] [address]    3. Septicemia (HCC)        Blood pressure 102/60, pulse 139, temperature 98.2 °F (36.8 °C), temperature source Oral, resp. rate 25, height 5' (1.524 m), weight 181 lb (82.1 kg), last menstrual period 05/20/1991, SpO2 97 %, not currently breastfeeding. Disposition:  DISPOSITION Decision To Transfer 02/03/2022 10:29:05 PM      Patient Referrals:  No follow-up provider specified. Discharge Medications:  New Prescriptions    No medications on file       Discontinued Medications:  Discontinued Medications    No medications on file       This chart was generated using the [address] [place] [address] dictation system. I created this record but it may contain dictation errors given the limitations of this technology.         See Munguia MD  02/03/22 [address] Tania Chavarria MD  02/03/22 [address] Tania Chavarria MD  02/03/22 1482

## 2022-02-04 NOTE — PROGRESS NOTES
Speech Language Pathology  Facility/Department: [place] [address] [facility]   CLINICAL BEDSIDE SWALLOW EVALUATION    NAME: Braden Mitchell  : 1940  MRN: [id number]    ADMISSION DATE: 2022  ADMITTING DIAGNOSIS: has OA (osteoarthritis) of knee; HTN (hypertension); Hyperlipidemia; Primary osteoarthritis of knee; Acquired varus deformity knee; Primary osteoarthritis of left knee; Chondromalacia patellae of left knee; Chronic pain of left knee; Primary osteoarthritis of right knee; Chondromalacia patellae of right knee; Chronic pain of right knee; Elevated liver enzymes; SOB (shortness of breath); Mediastinal adenopathy; Hilar adenopathy; Pulmonary infiltrate; Pleural effusion on right; Hepatic lesion; Pyuria; Hypokalemia; Suspected sleep apnea; Neuroendocrine tumor; Chronic hypoxemic respiratory failure ([address] Atelectasis; Small cell lung cancer, right ([address] Pressure ulcer of right buttock, stage 3 ([address]  Anemia of chronic disease; and Sepsis ([address] on their problem list.  ONSET DATE: 22    CT head 2/3/22  No acute intracranial abnormality.       Mild generalized atrophy and chronic ischemic changes.           CT of Chest 2322  Extensive consolidative changes throughout the right lower lobe and part of   right middle lobe are likely related to tumor infiltration as the bronchi   within this area appears to be infiltrated by soft tissue lesion.       Scattered areas of peripheral consolidation within the there are multiple   areas of consolidation throughout the right left upper lobe right upper lobe   and left lower lobe which have the appearance of the in   infectious/inflammatory condition based on their distribution.       Small left pleural effusion.       Innumerable ill-defined hypodense lesions are noted throughout the liver near   completely infiltrating the liver parenchyma concerning for diffuse   metastatic disease.       2 mm nonobstructing stone of midpole of right kidney.       No fracture.     Date of Eval: 2/4/2022  Evaluating Therapist: JOSUE Almanzar    Current Diet level:  Current Diet : NPO  Current Liquid Diet : NPO    Primary Complaint  Patient Complaint: pt states she has had some trouble with pills since this happened    Pain:  Pain Assessment  Pain Assessment: none reported    Reason for Referral  Christy Falcon was referred for a bedside swallow evaluation to assess the efficiency of her swallow function, identify signs and symptoms of aspiration and make recommendations regarding safe dietary consistencies, effective compensatory strategies, and safe eating environment. History Of Present Illness:    Per MD notes:  Rebekah Castro is a 79 y.o. female w/ PMHX of small cell lung cancer on chemotherapy, HTN, HLD presented to the [facility] after a fall at home. The patient uses a walker at home to ambulate and lives by herself. She fell around 6 PM when she was walking from kitchen to lounge, and was found by the local police 2.5 hours on the floor. She was unsure if she felt dizzy or stumbled upon an object, however fell on her hips. She does not report hitting her head or losing consciousness. On arrival to the ED, she was AOx4, hypotensive to systolic 75'I, tachycardic in 130's, normal RR, Saturating 98% on 4L Oxygen. (The patient uses 2L oxygen at baseline). Labs were remarkable for WBC 20.4, Hb 10.8, LA 7.1, Anion gap 19, Troponin 0.03 without EKG changes other than sinus tachycardia, GI liver profile remarkable for Alk Phos 417, , , Bilirubin 1.2. Blood cultures were sent, UA was unremarkable, Rapid flu negative, Carboxyhemoglobin 4.2. CT head was unremarkable for acute process, CT cervical spine remarkable for Acute type 2 odontoid fracture with slight anterior displacement of the C1 and odontoid process in relation to the body of C2. CT Scan of the right hip was unremarkable.  CT Scan of chest remarkable for extensive consolidative changes likely related to tumor infiltration. Scattered areas of consolidation in BL lobes were suspicious for an infectious/inflammatory distribution. She received a 2.5L IV fluids and Cefepime in the ED and was transferred to [facility] for further management. Of note, the patient sees a chiropractor regularly and saw him after a fall in January for head and neck adjustments and followed up, the last adjustment being on the 1st of February. She reported decreased ROM at neck that prevented her from lifting her head up. ROS of system positive for dizziness earlier this year and neck pain. It was negative for fevers, chills, chest pain, SOB, cough, nausea, vomiting, diarrhea or urinary complaints. She did not have any neurological deficits on exam.\"    Impression  Pt alert, oriented, follows commands and answered questions appropriately. Oral structures grossly intact, no asymmetry noted. Pt wearing cervical collar. Pt reports some trouble taking medications since fracture. Pt able to cough and swallow on command. Presented pt with puree, thin liquids via cup / straw, including 3 ounces of uninterrupted swallows, as well as a ludy cracker. Pt exhibited intermittent throat clearing across all consistencies, no cough or change in vocal quality. Good labial seal with no anterior loss of liquids. Unable to feel swallow movement due to presence of cervical collar. Pt exhibited no difficulty with mastication of soft solid, no oral residue. No c/o globus sensation  Due intermittent throat clearing, pt complaints and lung status, recommend MBS to fully assess swallow function and determine least restrictive diet. Dysphagia Diagnosis: Suspected needs further assessment  Dysphagia Outcome Severity Scale: Level 3: Moderate dysphagia- Total assisstance, supervision or strategies.  Two or more diet consistencies restricted     Treatment Plan  Requires SLP Intervention: Yes  Duration/Frequency of Treatment: TBD after MBS  D/C Recommendations: To be determined     Recommended Diet and Intervention  Diet Solids Recommendation:  (TBD after MBS)  Recommended Form of Meds: Meds in puree (if needed prior to [facility])  Recommendations: Modified barium swallow study     Compensatory Swallowing Strategies  Compensatory Swallowing Strategies:  (TBD)    Treatment/Goals  1-The patient/caregiver will demonstrate understanding of compensatory strategies for improved swallowing safety  2/4: Educated pt to purpose of visit, s/s of aspiration, concern if aspiration occurs, rationale for MBS study. Pt stated comprehension and agreeable  Cont goal     2-The patient will tolerate instrumental swallowing procedure    General  Chart Reviewed: Yes  Behavior/Cognition: Alert;Pleasant mood; Cooperative  Respiratory Status: O2 via nasual cannula  Communication Observation: Functional  Follows Directions: Complex  Dentition: Adequate  Patient Positioning: Upright in bed  Baseline Vocal Quality: Normal  Volitional Cough: Strong  Prior Dysphagia History: pt had MBS 8/30/20 with no dypshagia/aspiration identified  Consistencies Administered: Dysphagia Soft and Bite-Sized (Dysphagia III); Dysphagia Pureed (Dysphagia I); Thin - cup; Thin - straw; Thin - teaspoon; Ice Chips    Vision/Hearing  Vision  Vision: Within Functional Limits  Hearing  Hearing: Within functional limits    Oral Motor Deficits  Oral/Motor  Oral Motor: Within functional limits    Prognosis  Fair secondary to nature of defiicts    Education  Patient Education: Pt educated to purpose of visit and recommendations  Patient Education Response: Verbalizes understanding       Therapy Time  SLP Individual Minutes  Time In: 0930  Time Out: 6524  Minutes: 20     Plan:  Recommended diet:  TBD after MBS  MBS today  Pt therapy goal: to not have a problem  Pt dc goal: to get better and home  Bryce Lemus M.S./[facility]-SLP #[id number]  Pg.  # [phone]  Needs met prior to leaving room, call light within reach, d/w RN   This document will serve as a dc summary if pt dc prior to next visit  2/4/2022 10:03 AM

## 2022-02-04 NOTE — ED NOTES
Endorsed to [place] at [facility] [employer] Pending transportation.       Richie Samuels RN  02/04/22 0508

## 2022-02-04 NOTE — DISCHARGE INSTR - COC
Continuity of Care Form    Patient Name: Maurilio Polk   :  1940  MRN:  [id number]    Admit date:  2/3/2022  Discharge date:  ***    Code Status Order: Prior   Advance Directives:      Admitting Physician:  No admitting provider for patient encounter.   PCP: Nubia Fleming MD    Discharging Nurse: [facility] Unit/Room#: ED-0001/01  Discharging Unit Phone Number: ***    Emergency Contact:   Extended Emergency Contact Information  Primary Emergency Contact: Dalia Ponce of [address] [place] [address] Phone: [phone]  Mobile Phone: [phone]  Relation: Brother/Sister  Secondary Emergency Contact: [address] [place] [address] Phone: [phone]  Relation: Niece/Nephew    Past Surgical History:  Past Surgical History:   Procedure Laterality Date    APPENDECTOMY      BRONCHOSCOPY N/A 2020    BRONCHOSCOPY ALVEOLAR LAVAGE performed by Neo Little MD at [facility] 2020    BRONCHOSCOPY BRUSHINGS performed by Neo Little MD at [address] [place] [address] N/A 2020    BRONCHOSCOPY BIOPSY BRONCHUS performed by Neo Little MD at [address] [place] [address]  2020    BRONCHOSCOPY W/EBUS FNA performed by Neo Little MD at [address] [place] [address] N/A 2020    BRONCHOSCOPY ENDOBRONCHIAL ULTRASOUND performed by Neo Little MD at [address] [place] [address] Bilateral     TONSILLECTOMY         Immunization History:   Immunization History   Administered Date(s) Administered    COVID-19, Pfizer Purple top, DILUTE for use, 12+ yrs, 30mcg/0.3mL dose 2021, 2021, 2021    Influenza, High Dose (Fluzone 65 yrs and older) 2014, 2015, 10/24/2016, 2017, 2018    Influenza, Intradermal, Preservative free 10/11/2013    Influenza, Quadv, adjuvanted, 65 yrs +, IM, PF (Fluad) 10/20/2020, 10/07/2021    Influenza, Triv, inactivated, subunit, adjuvanted, IM (Fluad 65 yrs and older) 12/18/2019    Pneumococcal Conjugate 13-valent ([id number]) 12/31/2014    Pneumococcal Polysaccharide ([id number]) 08/29/2016, 10/24/2016    Tdap (Boostrix, Adacel) 08/10/2015    Zoster Live (Zostavax) 05/24/2011       Active Problems:  Patient Active Problem List   Diagnosis Code    OA (osteoarthritis) of knee M17.10    HTN (hypertension) I10    Hyperlipidemia E78.5    Primary osteoarthritis of knee M17.10    Acquired varus deformity knee M21.169    Primary osteoarthritis of left knee M17.12    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    Chronic pain of left knee M25.562, G89.29    Primary osteoarthritis of right knee M17.11    Chondromalacia patellae of right knee M22.41    Chronic pain of right knee M25.561, G89.29    Elevated liver enzymes R74.8    SOB (shortness of breath) R06.02    Mediastinal adenopathy R59.0    Hilar adenopathy R59.0    Pulmonary infiltrate R91.8    Pleural effusion on right J90    Hepatic lesion K76.9    Pyuria R82.81    Hypokalemia E87.6    Suspected sleep apnea R29.818    Neuroendocrine tumor D3A.8    Chronic hypoxemic respiratory failure (HCC) J96.11    Atelectasis J98.11    Small cell lung cancer, right (HCC) C34.91    Pressure ulcer of right buttock, stage 3 (HCC) L89.313    Anemia of chronic disease D63.8       Isolation/Infection:   Isolation            No Isolation          Patient Infection Status       Infection Onset Added Last Indicated Last Indicated By Review Planned Expiration Resolved Resolved By    COVID-19 (Rule Out) 02/03/22 02/03/22 02/03/22 COVID-19 (Ordered) 02/10/22 02/17/22              Nurse Assessment:  Last Vital Signs: /60   Pulse 139   Temp 98.2 °F (36.8 °C) (Oral)   Resp 25   Ht 5' (1.524 m)   Wt 181 lb (82.1 kg)   LMP 05/20/1991   SpO2 97%   BMI 35.35 kg/m²     Last documented pain score (0-10 scale): Pain Level: 2  Last Weight:   Wt Readings from Last 1 Encounters:   02/03/22 181 lb (82.1 kg)     Mental Status:  {IP PT MENTAL STATUS:}    IV Access:  { [facility] IV ACCESS:219609412}    Nursing Mobility/ADLs:  Walking   {CHP DME WDIW:050227299}  Transfer  {CHP DME QYJL:781700233}  Bathing  {CHP DME ZBBE:464579817}  Dressing  {CHP DME GJUH:425408372}  Toileting  {CHP DME RQPB:017887530}  Feeding  {P DME XDFE:192825655}  Med Admin  {P DME CPRA:679293207}  Med Delivery   { RUBIO MED Delivery:026670990}    Wound Care Documentation and Therapy:        Elimination:  Continence: Bowel: {YES / QY:96339}  Bladder: {YES / RX:50137}  Urinary Catheter: {Urinary Catheter:877520017}   Colostomy/Ileostomy/Ileal Conduit: {YES / VY:42269}       Date of Last BM: ***  No intake or output data in the 24 hours ending 22 2258  No intake/output data recorded.     Safety Concerns:     [employer] Safety Concerns:948035603}    Impairments/Disabilities:      [employer] Impairments/Disabilities:974686603}    Nutrition Therapy:  Current Nutrition Therapy:   [employer] Diet List:832353883}    Routes of Feeding: {[facility] DME Other Feedings:177289602}  Liquids: {Slp liquid thickness:67433}  Daily Fluid Restriction: {CHP DME Yes amt example:725095935}  Last Modified Barium Swallow with Video (Video Swallowing Test): {Done Not Done BORE:465829979}    Treatments at the Time of Hospital Discharge:   Respiratory Treatments: ***  Oxygen Therapy:  {Therapy; copd oxygen:46560}  Ventilator:    {[facility] Vent QSCI:031508389}    Rehab Therapies: {THERAPEUTIC INTERVENTION:7366280386}  Weight Bearing Status/Restrictions: [facility] Weight Bearin}  Other Medical Equipment (for information only, NOT a DME order):  {EQUIPMENT:162992808}  Other Treatments: ***    Patient's personal belongings (please select all that are sent with patient):  {[facility] DME Belongings:709523648}    RN SIGNATURE:  {Esignature:167300642}    CASE MANAGEMENT/SOCIAL WORK SECTION    Inpatient Status Date: ***    Readmission Risk Assessment Score:  Readmission Risk              Risk of Unplanned Readmission:  0 Discharging to Facility/ Agency   Name:   Address:  Phone:  Fax:    Dialysis Facility (if applicable)   Name:  Address:  Dialysis Schedule:  Phone:  Fax:    / signature: {Esignature:003301966}    PHYSICIAN SECTION    Prognosis: {Prognosis:5286182921}    Condition at Discharge: Stephanie Cazares Patient Condition:313506879}    Rehab Potential (if transferring to Rehab): {Prognosis:9228043402}    Recommended Labs or Other Treatments After Discharge: ***    Physician Certification: I certify the above information and transfer of Penny Amato  is necessary for the continuing treatment of the diagnosis listed and that she requires {Admit to Appropriate Level of Care:04623} for {GREATER/LESS:447824718} 30 days.      Update Admission H&P: {CHP DME Changes in CWPZW:000843909}    PHYSICIAN SIGNATURE:  {Esignature:271149049}

## 2022-02-04 NOTE — CONSULTS
2000 UNITS CAPS, Take by mouth  [DISCONTINUED] famotidine (PEPCID) 20 MG tablet, TAKE ONE TABLET BY MOUTH TWICE A DAY  [DISCONTINUED] chlorthalidone (HYGROTON) 25 MG tablet, TAKE ONE TABLET BY MOUTH DAILY  [DISCONTINUED] ursodiol (ACTIGALL) 300 MG capsule, Take 300 mg by mouth 2 times daily  verapamil (CALAN SR) 180 MG extended release tablet, TAKE ONE TABLET BY MOUTH TWICE A DAY  [DISCONTINUED] fluticasone (FLONASE) 50 MCG/ACT nasal spray, 2 sprays by Nasal route daily  potassium chloride (KLOR-CON M) 20 MEQ extended release tablet, TAKE ONE TABLET BY MOUTH DAILY  [DISCONTINUED] Bioflavonoid Products (BIOFLEX PO), Take by mouth  [DISCONTINUED] Ascorbic Acid (VITAMIN C WITH AZRA HIPS) 500 MG tablet, Take 500 mg by mouth daily. [DISCONTINUED] Multiple Vitamins-Minerals (RA VISION-DEBBY PRESERVE PO), Take  by mouth.   Current Medications:    Current Facility-Administered Medications: famotidine (PEPCID) tablet 20 mg, 1 tablet, Oral, BID  [Held by provider] atorvastatin (LIPITOR) tablet 10 mg, 10 mg, Oral, Daily  [Held by provider] chlorthalidone (HYGROTON) tablet 25 mg, 25 mg, Oral, Daily  [Held by provider] ursodiol (ACTIGALL) capsule 300 mg, 300 mg, Oral, BID  [Held by provider] verapamil (CALAN SR) extended release tablet 180 mg, 180 mg, Oral, BID  sodium chloride flush 0.9 % injection 5-40 mL, 5-40 mL, IntraVENous, 2 times per day  sodium chloride flush 0.9 % injection 5-40 mL, 5-40 mL, IntraVENous, PRN  0.9 % sodium chloride infusion, 25 mL, IntraVENous, PRN  acetaminophen (TYLENOL) tablet 650 mg, 650 mg, Oral, Q6H PRN **OR** acetaminophen (TYLENOL) suppository 650 mg, 650 mg, Rectal, Q6H PRN  0.9 % sodium chloride infusion, , IntraVENous, Continuous  perflutren lipid microspheres (DEFINITY) injection 1.65 mg, 1.5 mL, IntraVENous, ONCE PRN  glucose (GLUTOSE) 40 % oral gel 15 g, 15 g, Oral, PRN  dextrose 10 % infusion, 12.5 g, IntraVENous, PRN  glucagon (rDNA) injection 1 mg, 1 mg, IntraMUSCular, PRN  dextrose 5 % solution, 100 mL/hr, IntraVENous, PRN  HYDROmorphone (DILAUDID) injection 0.5 mg, 0.5 mg, IntraVENous, Q6H PRN  [COMPLETED] piperacillin-tazobactam (ZOSYN) 4,500 mg in dextrose 5 % 100 mL IVPB (mini-bag), 4,500 mg, IntraVENous, Once **AND** piperacillin-tazobactam (ZOSYN) 4,500 mg in dextrose 5 % 100 mL IVPB extended infusion (mini-bag), 4,500 mg, IntraVENous, Q8H  metoprolol tartrate (LOPRESSOR) tablet 25 mg, 25 mg, Oral, BID  linezolid (ZYVOX) IVPB 600 mg, 600 mg, IntraVENous, Q12H  Allergies:  Penicillins and Tetracyclines & related    Social History:    TOBACCO:   reports that she has never smoked. She has never used smokeless tobacco.  ETOH:   reports previous alcohol use. DRUGS:   reports no history of drug use. Family History:       Problem Relation Age of Onset    Heart Disease Mother      REVIEW OF SYSTEMS:    A comprehensive 12 point review of systems is negative except as documented above.      PHYSICAL EXAM:      Vitals:    /75   Pulse 102   Temp 97.9 °F (36.6 °C) (Oral)   Resp 20   Ht 5' (1.524 m)   Wt 181 lb 7 oz (82.3 kg)   LMP 05/20/1991   SpO2 98%   BMI 35.43 kg/m²     General: chronically ill appearing  HEENT: PERRL, EOMI, oral mucosa moist and intact, no sclericterus  Neck: [place] J collar on  Lymphatic: no cervical or supraclavicular lymphadenopathy  Heart: no m/r/g; +s1/s2 rrr; port left chest  Lungs: dec breath sounds bilaterally right worse than left  Abdomen: soft, nt, nd +BS  Extremities: 2+pulses, no edema  Skin: no rashes or lesions  Neuro: a&o x 3; no gross deficit  DATA:    Recent Labs     02/03/22 2056 02/04/22  0632 02/04/22  1058   WBC 20.4* 17.4* 17.0*   HGB 10.8* 9.2* 9.5*   HCT 33.8* 28.0* 28.7*   MCV 92.8 92.1 91.9    149 139     Recent Labs     02/03/22 2056 02/04/22  0632   * 132*   K 4.3 4.4   CL 90* 95*   CO2 22 28   BUN 29* 28*   CREATININE 0.9 0.7     Recent Labs     02/03/22 2056   *   *   BILITOT 1.2*   ALKPHOS 417*     No results for input(s): LIPASE, AMYLASE in the last 72 hours. Recent Labs     02/03/22 2056   PROT 6.0*   INR 1.90*     No results for input(s): PTT in the last 72 hours. No results for input(s): OCCULTBLD in the last 72 hours. Imaging: CT    IMPRESSION/RECOMMENDATIONS:      Elevated liver enzymes  PNA  LungCA  fall    Consistent with an infiltrative pattern. Progressive diffuse hepatic mets likely cause. Also consider increase from muscle given she was on the ground for several hours although CK normal. Consider paclitaxel as cause of increase as well. Ischemic may also be playing a role. Given age and poor prognosis would not do further serologic evaluation  Treat supportively  Give vitamin K as likely a component of nutritional deficiency  Call back with questions or concerns      Thank you for allowing me to participate in [facility]. Bernie Speaker.  Kenyon Mireles MD

## 2022-02-04 NOTE — SIGNIFICANT EVENT
Rapid response called at 7:52 for tachycardia in the 150s. On arrival to rapid response, patient was tachycardic to the 130s, /76, RR 14, sating well on 4L NC. She was alert, oriented x3. Reporting no CP, SOB, palpitations, N/V, lightheadedness or dizziness. Neck pain well controlled. Reporting some mild knee pain from osteoarthritis. Rest of ROS negative. On exam she was in NAD, tachycardic with regular rhythem and no murmurs. Abdomen soft, nontender and non-distended. Neuro exam with no FND. CBC, BMP, mag were ordered. EKG showed sinus tachycardia likely 2/2 sepsis and volume depletion. 1L NS bolus was ordered. Will follow results peripherally.

## 2022-02-04 NOTE — PLAN OF CARE
General Internal Medicine Attending    Chart and data reviewed. Patient seen and examined, case discussed with medical resident. Physical exam repeated. Labs and imaging studies reviewed. Agree with documentation, assessment and plan as outlined. Transferred from [facility] for neurosurgical evaluation, following a fall at home. Fall preceded by dizziness. Patient was found to have C1 fracture/odontoid type II          #Traumatic C1 fracture/odontoid type II s  #Fall  #Sepsis likely secondary to postobstructive pneumonia  #Lactic acidosis: sec to sepsis  #Narrow complex tachycardia, Prob. sinus tachycardia with PACs or MAT  #Chronic respiratory failure sec lung cancer, on 2L. Acute respiratory failure ruled out  #Immunosuppression, on chemotherapy s/p first dose. 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 No CBD dilation. #Chronic normocytic anemia: sec to malignancy and chemotherapy   #Elevated troponin-chronic        CT head with no acute abnormalities    Found to have C1 fracture/odontoid type II possibly sec to fall/ hx of falls, with osteopenia; but cannot rule out injury related to chiropractic manipulation: no apparent neuro deficit    Pxt has hx of falls, proceeded by dizziness. Usually ambulates with a walker. Etiology of fall, not entirely clear, may be sec to arrythmias, as she has had episode of signif tachycardia, or sec to infection/sepsis.      - We will keep on telemetry  - Treat infection  - Check B12, TSH  - If dizziness persist, we will re-eval plan  - PT/OT eval      Elevated LFTs and coagulopathy, likely secondary to liver mets    Patient is on chemotherapy for metastatic lung cancer with CT unfortunately, showing Extensive consolidative changes throughout the right lower lobe and part of right middle lobe are likely related to tumor infiltration     Also noted Innumerable liver mets    Continue on spine precautions, neck collar    -Every 4 hours neurochecks  -Pain relief    -Continue IV hydration,  BS IV antibiotics, bronchodilator therapy and supplemental oxygen. Add Linezolid and obtain MRSA screen   - Follow cx data    -Follow COVID-19 PCR    -Neurosurgery consulted    Palliative Care consult. Rest as per resident's note.       Silvino Puentes MD

## 2022-02-04 NOTE — CONSULTS
RD did not conduct direct, in-person nutrition evaluation in efforts to reduce exposure and use of PPE for high risk persons, PUI persons, patients who have tested positive for Covid-19 or those awaiting respiratory panel results. EMR was screened for nutrition risk factors, as defined per nutrition standards of care. NUTRITION NOTE   Admission Date: 2/4/2022     Type and Reason for Visit: Positive Nutrition Screen,Consult    NUTRITION RECOMMENDATIONS:   1. PO Diet: Currently NPO; diet advancement per SLP/MD   2. ONS: If diet able to advance to at least a full liquid, recommend starting high calorie, high protein supplement BID    NUTRITION ASSESSMENT:  Received consult and positive nutrition screen for poor intake. Pt currently in [facility] R/O~unable to see pt in room at this time in efforts to preserve PPE and reduce risk of exposure. Pt currently NPO; noted plans for MBS today per SLP. RD will monitor for diet advancement and plan of care throughout adm. Patient admitted d/t fall, sepsis    PMH significant for: small cell lung cancer on chemotherapy, HTN, HLD    MALNUTRITION ASSESSMENT  Context of Malnutrition: Acute Illness   Malnutrition Status: Insufficient data    NUTRITION DIAGNOSIS No nutrition diagnosis at this time. · Inadequate oral intake related to catabolic illness as evidenced by NPO or clear liquid status due to medical condition    [address] and/or Nutrient Delivery:  Continue NPO  Nutrition Education/Counseling:  No recommendation at this time      The patient will still be monitored per nutrition standards of care. Consult dietitian if nutrition interventions essential to patient care is needed.      Christian Sommers, [address] [place] [address]  Shailesh:  [phone]  Office:  [phone]

## 2022-02-04 NOTE — PROGRESS NOTES
Pt admitted to room 4312 from [place] ED. Pt was tachycardic in the 130's/140's on admission- residents notified. Skin checked X2 RN- see below. Pt c/o neck pain and R hip/elbow pain. Pt shows sinus tach on tele. Residents to bedside to see pt- awaiting orders. Bed alarm on, bed locked, in lowest position and call light within reach. Will continue to monitor. 4 Eyes Admission Assessment     I agree as the admission nurse that 2 RN's have performed a thorough Head to Toe Skin Assessment on the patient. ALL assessment sites listed below have been assessed on admission. Areas assessed by both nurses:   [x]   Head, Face, and Ears   [x]   Shoulders, Back, and Chest  [x]   Arms, Elbows, and Hands   [x]   Coccyx, Sacrum, and Ischium  [x]   Legs, Feet, and Heels     Wound on R & L buttocks   Skin tear to R elbow  Excoriation under bilat breasts  Swollen bilat lower extremities        Does the Patient have Skin Breakdown?   Yes a wound was noted on the Admission Assessment and an LDA was Initiated documentation include the Breonna-wound, Wound Assessment, Measurements, Dressing Treatment, Drainage, and Color\",         Rajendra Prevention initiated:  Yes   Wound Care Orders initiated:  Yes      [facility] nurse consulted for Pressure Injury (Stage 3,4, Unstageable, DTI, NWPT, and Complex wounds) or Rajendra score 18 or lower:  Yes      Nurse 1 eSignature: Electronically signed by Deion Jefferson RN on 2/4/22 at 3:02 AM EST    **SHARE this note so that the co-signing nurse is able to place an eSignature**    Nurse 2 eSignature: Electronically signed by Maryanne Alvarez RN on 2/5/22 at 7:58 AM EST

## 2022-02-05 LAB
ANION GAP SERPL CALCULATED.3IONS-SCNC: 6 MMOL/L (ref 3–16)
BASOPHILS ABSOLUTE: 0 K/UL (ref 0–0.2)
BASOPHILS RELATIVE PERCENT: 0.1 %
BUN BLDV-MCNC: 19 MG/DL (ref 7–20)
CALCIUM SERPL-MCNC: 7.4 MG/DL (ref 8.3–10.6)
CHLORIDE BLD-SCNC: 96 MMOL/L (ref 99–110)
CO2: 28 MMOL/L (ref 21–32)
CREAT SERPL-MCNC: <0.5 MG/DL (ref 0.6–1.2)
EKG ATRIAL RATE: 132 BPM
EKG ATRIAL RATE: 137 BPM
EKG DIAGNOSIS: NORMAL
EKG DIAGNOSIS: NORMAL
EKG P AXIS: 46 DEGREES
EKG P-R INTERVAL: 136 MS
EKG P-R INTERVAL: 168 MS
EKG Q-T INTERVAL: 294 MS
EKG Q-T INTERVAL: 298 MS
EKG QRS DURATION: 58 MS
EKG QRS DURATION: 72 MS
EKG QTC CALCULATION (BAZETT): 435 MS
EKG QTC CALCULATION (BAZETT): 449 MS
EKG R AXIS: -15 DEGREES
EKG R AXIS: -7 DEGREES
EKG T AXIS: 19 DEGREES
EKG T AXIS: 90 DEGREES
EKG VENTRICULAR RATE: 132 BPM
EKG VENTRICULAR RATE: 137 BPM
EOSINOPHILS ABSOLUTE: 0 K/UL (ref 0–0.6)
EOSINOPHILS RELATIVE PERCENT: 0 %
GFR AFRICAN AMERICAN: >60
GFR NON-AFRICAN AMERICAN: >60
GLUCOSE BLD-MCNC: 112 MG/DL (ref 70–99)
GLUCOSE BLD-MCNC: 116 MG/DL (ref 70–99)
GLUCOSE BLD-MCNC: 118 MG/DL (ref 70–99)
GLUCOSE BLD-MCNC: 121 MG/DL (ref 70–99)
GLUCOSE BLD-MCNC: 204 MG/DL (ref 70–99)
HCT VFR BLD CALC: 28.3 % (ref 36–48)
HEMOGLOBIN: 9.3 G/DL (ref 12–16)
L. PNEUMOPHILA SEROGP 1 UR AG: NORMAL
LV EF: 60 %
LVEF MODALITY: NORMAL
LYMPHOCYTES ABSOLUTE: 0.3 K/UL (ref 1–5.1)
LYMPHOCYTES RELATIVE PERCENT: 2.7 %
MAGNESIUM: 1.6 MG/DL (ref 1.8–2.4)
MCH RBC QN AUTO: 29.9 PG (ref 26–34)
MCHC RBC AUTO-ENTMCNC: 33 G/DL (ref 31–36)
MCV RBC AUTO: 90.7 FL (ref 80–100)
MONOCYTES ABSOLUTE: 0.1 K/UL (ref 0–1.3)
MONOCYTES RELATIVE PERCENT: 0.5 %
MRSA SCREEN RT-PCR: NORMAL
NEUTROPHILS ABSOLUTE: 11.6 K/UL (ref 1.7–7.7)
NEUTROPHILS RELATIVE PERCENT: 96.7 %
PDW BLD-RTO: 19.8 % (ref 12.4–15.4)
PERFORMED ON: ABNORMAL
PLATELET # BLD: 124 K/UL (ref 135–450)
PMV BLD AUTO: 8.9 FL (ref 5–10.5)
POTASSIUM REFLEX MAGNESIUM: 3.5 MMOL/L (ref 3.5–5.1)
RBC # BLD: 3.12 M/UL (ref 4–5.2)
REPORT: NORMAL
SARS-COV-2, PCR: NOT DETECTED
SODIUM BLD-SCNC: 130 MMOL/L (ref 136–145)
STREP PNEUMONIAE ANTIGEN, URINE: NORMAL
TSH REFLEX: 0.53 UIU/ML (ref 0.27–4.2)
URINE CULTURE, ROUTINE: NORMAL
VITAMIN B-12: >2000 PG/ML (ref 211–911)
WBC # BLD: 12 K/UL (ref 4–11)

## 2022-02-05 PROCEDURE — 6360000002 HC RX W HCPCS: Performed by: INTERNAL MEDICINE

## 2022-02-05 PROCEDURE — 2580000003 HC RX 258

## 2022-02-05 PROCEDURE — 2060000000 HC ICU INTERMEDIATE R&B

## 2022-02-05 PROCEDURE — 94761 N-INVAS EAR/PLS OXIMETRY MLT: CPT

## 2022-02-05 PROCEDURE — 83735 ASSAY OF MAGNESIUM: CPT

## 2022-02-05 PROCEDURE — 6370000000 HC RX 637 (ALT 250 FOR IP): Performed by: NURSE PRACTITIONER

## 2022-02-05 PROCEDURE — 80048 BASIC METABOLIC PNL TOTAL CA: CPT

## 2022-02-05 PROCEDURE — 93010 ELECTROCARDIOGRAM REPORT: CPT | Performed by: INTERNAL MEDICINE

## 2022-02-05 PROCEDURE — 2580000003 HC RX 258: Performed by: NURSE PRACTITIONER

## 2022-02-05 PROCEDURE — 6370000000 HC RX 637 (ALT 250 FOR IP): Performed by: STUDENT IN AN ORGANIZED HEALTH CARE EDUCATION/TRAINING PROGRAM

## 2022-02-05 PROCEDURE — 6360000002 HC RX W HCPCS: Performed by: STUDENT IN AN ORGANIZED HEALTH CARE EDUCATION/TRAINING PROGRAM

## 2022-02-05 PROCEDURE — 6370000000 HC RX 637 (ALT 250 FOR IP): Performed by: INTERNAL MEDICINE

## 2022-02-05 PROCEDURE — 6360000002 HC RX W HCPCS: Performed by: NURSE PRACTITIONER

## 2022-02-05 PROCEDURE — 6360000004 HC RX CONTRAST MEDICATION

## 2022-02-05 PROCEDURE — 85025 COMPLETE CBC W/AUTO DIFF WBC: CPT

## 2022-02-05 PROCEDURE — 6360000002 HC RX W HCPCS

## 2022-02-05 PROCEDURE — C8929 TTE W OR WO FOL WCON,DOPPLER: HCPCS

## 2022-02-05 PROCEDURE — 2700000000 HC OXYGEN THERAPY PER DAY

## 2022-02-05 RX ORDER — MAGNESIUM SULFATE IN WATER 40 MG/ML
2000 INJECTION, SOLUTION INTRAVENOUS ONCE
Status: COMPLETED | OUTPATIENT
Start: 2022-02-05 | End: 2022-02-05

## 2022-02-05 RX ADMIN — SODIUM CHLORIDE, PRESERVATIVE FREE 10 ML: 5 INJECTION INTRAVENOUS at 01:28

## 2022-02-05 RX ADMIN — SODIUM CHLORIDE 25 ML: 9 INJECTION, SOLUTION INTRAVENOUS at 03:05

## 2022-02-05 RX ADMIN — PIPERACILLIN SODIUM AND TAZOBACTAM SODIUM 4500 MG: 4; 500 INJECTION, POWDER, FOR SOLUTION INTRAVENOUS at 19:01

## 2022-02-05 RX ADMIN — METHOCARBAMOL 1000 MG: 100 INJECTION, SOLUTION INTRAMUSCULAR; INTRAVENOUS at 01:32

## 2022-02-05 RX ADMIN — PIPERACILLIN SODIUM AND TAZOBACTAM SODIUM 4500 MG: 4; 500 INJECTION, POWDER, FOR SOLUTION INTRAVENOUS at 05:35

## 2022-02-05 RX ADMIN — SODIUM CHLORIDE 25 ML: 9 INJECTION, SOLUTION INTRAVENOUS at 10:34

## 2022-02-05 RX ADMIN — METOPROLOL TARTRATE 25 MG: 25 TABLET, FILM COATED ORAL at 23:10

## 2022-02-05 RX ADMIN — SODIUM CHLORIDE 25 ML: 9 INJECTION, SOLUTION INTRAVENOUS at 14:52

## 2022-02-05 RX ADMIN — SODIUM CHLORIDE 25 ML: 9 INJECTION, SOLUTION INTRAVENOUS at 01:30

## 2022-02-05 RX ADMIN — LINEZOLID 600 MG: 2 INJECTION, SOLUTION INTRAVENOUS at 03:07

## 2022-02-05 RX ADMIN — METHOCARBAMOL 750 MG: 750 TABLET ORAL at 16:33

## 2022-02-05 RX ADMIN — METHOCARBAMOL 750 MG: 750 TABLET ORAL at 22:46

## 2022-02-05 RX ADMIN — SODIUM CHLORIDE 25 ML: 9 INJECTION, SOLUTION INTRAVENOUS at 19:00

## 2022-02-05 RX ADMIN — SODIUM CHLORIDE 25 ML: 9 INJECTION, SOLUTION INTRAVENOUS at 11:12

## 2022-02-05 RX ADMIN — PIPERACILLIN SODIUM AND TAZOBACTAM SODIUM 4500 MG: 4; 500 INJECTION, POWDER, FOR SOLUTION INTRAVENOUS at 14:53

## 2022-02-05 RX ADMIN — METHOCARBAMOL 1000 MG: 100 INJECTION, SOLUTION INTRAMUSCULAR; INTRAVENOUS at 10:34

## 2022-02-05 RX ADMIN — PERFLUTREN 1.65 MG: 6.52 INJECTION, SUSPENSION INTRAVENOUS at 09:24

## 2022-02-05 RX ADMIN — METOPROLOL TARTRATE 25 MG: 25 TABLET, FILM COATED ORAL at 10:21

## 2022-02-05 RX ADMIN — LINEZOLID 600 MG: 2 INJECTION, SOLUTION INTRAVENOUS at 11:13

## 2022-02-05 RX ADMIN — VERAPAMIL HYDROCHLORIDE 180 MG: 180 TABLET, FILM COATED, EXTENDED RELEASE ORAL at 10:23

## 2022-02-05 RX ADMIN — SODIUM CHLORIDE, PRESERVATIVE FREE 10 ML: 5 INJECTION INTRAVENOUS at 10:22

## 2022-02-05 RX ADMIN — SODIUM CHLORIDE, PRESERVATIVE FREE 10 ML: 5 INJECTION INTRAVENOUS at 23:16

## 2022-02-05 RX ADMIN — MAGNESIUM SULFATE HEPTAHYDRATE 2000 MG: 2 INJECTION, SOLUTION INTRAVENOUS at 12:25

## 2022-02-05 RX ADMIN — SODIUM CHLORIDE 25 ML: 9 INJECTION, SOLUTION INTRAVENOUS at 12:20

## 2022-02-05 RX ADMIN — POTASSIUM BICARBONATE 40 MEQ: 782 TABLET, EFFERVESCENT ORAL at 14:55

## 2022-02-05 ASSESSMENT — ENCOUNTER SYMPTOMS
ABDOMINAL DISTENTION: 0
BLOOD IN STOOL: 0
NAUSEA: 0
ABDOMINAL PAIN: 0
WHEEZING: 0
VOMITING: 0
CHEST TIGHTNESS: 0
COUGH: 0
RHINORRHEA: 0
SHORTNESS OF BREATH: 0
COLOR CHANGE: 0
SORE THROAT: 0
DIARRHEA: 0
CONSTIPATION: 0

## 2022-02-05 ASSESSMENT — PAIN SCALES - GENERAL
PAINLEVEL_OUTOF10: 0

## 2022-02-05 NOTE — PLAN OF CARE
Problem: Pain:  Goal: Recognizes and communicates pain  Description: Recognizes and communicates pain  Outcome: Ongoing  Note: Patient will notify RN if and when in pain. Problem: Swallowing - Impaired:  Goal: Able to swallow without choking  Description: Able to swallow without choking  2/5/2022 1821 by Reema Ross RN  Outcome: Ongoing  Note: Patient will sit up right when eating and drinking at all times to help prevent choking. 2/5/2022 0426 by Kimberley Crespo RN  Outcome: Ongoing  Note: Pt able to take medications with thin liquids. Pt did not choke while swallowing medications with water.

## 2022-02-05 NOTE — PLAN OF CARE
Problem: Mobility - Impaired:  Goal: Able to ambulate independently  Description: Able to ambulate independently  Outcome: Ongoing  Note: Pt able to turn in bed with assistance. Turning pt Q2H in bed. Will continue to monitor. Problem: Swallowing - Impaired:  Goal: Able to swallow without choking  Description: Able to swallow without choking  Outcome: Ongoing  Note: Pt able to take medications with thin liquids. Pt did not choke while swallowing medications with water. Problem: Falls - Risk of:  Goal: Will remain free from falls  Description: Will remain free from falls  2/5/2022 0426 by Kimberley Crespo RN  Outcome: Ongoing  Note: Fall precautions are in place. Bed alarm is on and in lowest position. Pt is using call light appropriately. Will continue to monitor.

## 2022-02-05 NOTE — PROGRESS NOTES
Progress Note    Admit Date: 2/4/2022  Day: 2  Diet: ADULT DIET; Easy to Chew    CC: Fall    Interval history: No acute events over night. Patient and her son were seen at bed side. She said that she feels good and denied nausea, vomiting, diarrhea. No tachycardia over night. Tele with sinus rhythm. Stopped Linezolid (MRSA negative). Otherwise, the patient continues to have right hip pain, left knee pain secondary to OA. No HA, dizziness, CP, N/V/D/C. HPI: Alex madera 79 y. o. female w/ PMHX of small cell lung cancer on chemotherapy, HTN, HLD presented to the [facility] after a fall at home. The patient uses a walker at home to ambulate and lives by herself. She fell around 6 PM when she was walking from kitchen to lounge, and was found by the local police 2.5 hours on the floor. She was unsure if she felt dizzy or stumbled upon an object, however fell on her hips. She does not report hitting her head or losing consciousness.      On arrival to the ED, she was AOx4, hypotensive to systolic 24'W, [id number] in 130's, normal RR, Saturating 98% on 4L Oxygen. (The patient uses 2L oxygen at baseline). Labs were remarkable for WBC 20.4, Hb 10.8, LA 7.1, Anion gap 19, Troponin 0.03 without EKG changes other than sinus tachycardia, GI liver profile remarkable for Alk Phos 417, , , Bilirubin 1.2. Blood cultures were sent, UA was unremarkable, Rapid flu negative, Carboxyhemoglobin 4.2.     CT head was unremarkable for acute process, CT cervical spine remarkable for Acute type 2 odontoid fracture with slight anterior displacement of the C1 and odontoid process in relation to the body of C2. CT Scan of the right hip was unremarkable. CT Scan of chest remarkable for extensive consolidative changes likely related to tumor infiltration. Scattered areas of consolidation in BL lobes were suspicious for an infectious/inflammatory distribution.  She received a 2.5L IV fluids and Cefepime in the ED and was transferred to [facility] for further management.      Of note, the patient sees a chiropractor regularly and saw him after a fall in January for head and neck adjustments and followed up, the last adjustment being on the 1st of February. She reported decreased ROM at neck that prevented her from lifting her head up. ROS of system positive for dizziness earlier this year and neck pain. It was negative for fevers, chills, chest pain, SOB, cough, nausea, vomiting, diarrhea or urinary complaints. She did not have any neurological deficits on exam.     Medications:     Scheduled Meds:   magnesium sulfate  2,000 mg IntraVENous Once    potassium bicarb-citric acid  40 mEq Oral Once    [Held by provider] atorvastatin  10 mg Oral Daily    [Held by provider] chlorthalidone  25 mg Oral Daily    [Held by provider] ursodiol  300 mg Oral BID    verapamil  180 mg Oral BID    sodium chloride flush  5-40 mL IntraVENous 2 times per day    piperacillin-tazobactam  4,500 mg IntraVENous Q8H    metoprolol tartrate  25 mg Oral BID    methocarbamol  750 mg Oral Q6H     Continuous Infusions:   sodium chloride 25 mL (02/05/22 1220)    dextrose      dextrose       PRN Meds:sodium chloride flush, sodium chloride, acetaminophen **OR** acetaminophen, glucose, dextrose, glucagon (rDNA), dextrose, HYDROmorphone    Objective:   Vitals:   T-max:  Patient Vitals for the past 8 hrs:   BP Temp Temp src Pulse Resp SpO2   02/05/22 1246 109/60 97.8 °F (36.6 °C) Oral 89 18 93 %   02/05/22 1015 124/64 97.9 °F (36.6 °C) Oral 117 20 94 %   02/05/22 0800    117     02/05/22 0600    110     02/05/22 0518 129/77 99.4 °F (37.4 °C) Oral 114 20 96 %       Intake/Output Summary (Last 24 hours) at 2/5/2022 1303  Last data filed at 2/4/2022 2143  Gross per 24 hour   Intake    Output 550 ml   Net -550 ml       Review of Systems   Constitutional: Negative for appetite change, chills and fever.    HENT: Negative for congestion, hearing loss, rhinorrhea and sore throat. Eyes: Negative for visual disturbance. Respiratory: Negative for cough, chest tightness, shortness of breath and wheezing. Cardiovascular: Negative for chest pain, palpitations and leg swelling. Gastrointestinal: Negative for abdominal distention, abdominal pain, blood in stool, constipation, diarrhea, nausea and vomiting. Endocrine: Negative for polyuria. Genitourinary: Negative for difficulty urinating and dysuria. Musculoskeletal: Positive for arthralgias (right hip, left knee). Negative for myalgias. Skin: Negative for color change, pallor and rash. Neurological: Negative for dizziness, seizures, syncope, facial asymmetry, weakness, light-headedness and headaches. Psychiatric/Behavioral: Negative for behavioral problems, confusion and hallucinations. Physical Exam  Constitutional:       General: She is not in acute distress. Appearance: Normal appearance. She is not ill-appearing, toxic-appearing or diaphoretic. HENT:      Head: Normocephalic and atraumatic. Right Ear: External ear normal.      Left Ear: External ear normal.      Nose: Nose normal. No congestion or rhinorrhea. Mouth/Throat:      Mouth: Mucous membranes are moist.      Pharynx: Oropharynx is clear. Eyes:      Extraocular Movements: Extraocular movements intact. Conjunctiva/sclera: Conjunctivae normal.      Pupils: Pupils are equal, round, and reactive to light. Neck:      Comments: Cervical collar in place  Cardiovascular:      Rate and Rhythm: Tachycardia present. Rhythm irregular. Pulses: Normal pulses. Heart sounds: Normal heart sounds. No friction rub. No gallop. Pulmonary:      Effort: Pulmonary effort is normal. No respiratory distress. Breath sounds: Normal breath sounds. No wheezing or rales. Abdominal:      General: Abdomen is flat. Bowel sounds are normal. There is no distension. Palpations: Abdomen is soft.  There is no mass. Tenderness: There is no abdominal tenderness. There is no guarding or rebound. Hernia: No hernia is present. Musculoskeletal:         General: No swelling, tenderness, deformity or signs of injury. Normal range of motion. Skin:     General: Skin is warm and dry. Capillary Refill: Capillary refill takes less than 2 seconds. Coloration: Skin is not jaundiced or pale. Neurological:      General: No focal deficit present. Mental Status: She is alert and oriented to person, place, and time. Cranial Nerves: No cranial nerve deficit. Psychiatric:         Mood and Affect: Mood normal.         Behavior: Behavior normal.         Thought Content: Thought content normal.         Judgment: Judgment normal.         LABS:    CBC:   Recent Labs     02/04/22 0632 02/04/22 1058 02/05/22  0534   WBC 17.4* 17.0* 12.0*   HGB 9.2* 9.5* 9.3*   HCT 28.0* 28.7* 28.3*    139 124*   MCV 92.1 91.9 90.7     Renal:    Recent Labs     02/04/22 1058 02/04/22 1958 02/05/22  0534   * 132* 130*   K 4.3 3.7 3.5   CL 97* 98* 96*   CO2 27 27 28   BUN 25* 23* 19   CREATININE <0.5* <0.5* <0.5*   GLUCOSE 94 118* 121*   CALCIUM 7.7* 7.5* 7.4*   MG 1.70*  --  1.60*   PHOS  --  2.1*  --    ANIONGAP 8 7 6     Hepatic:   Recent Labs     02/03/22 2056 02/04/22 1058 02/04/22 1958   * 1,276*  --    * 438*  --    BILITOT 1.2* 1.3*  --    BILIDIR  --  1.0*  --    PROT 6.0* 5.1*  --    LABALBU 2.6* 2.2* 2.3*   ALKPHOS 417* 332*  --      Troponin:   Recent Labs     02/03/22 2056 02/04/22 0324 02/04/22 1058   TROPONINI 0.03* <0.01 <0.01     Lactate: 7.1 -> 4.3 -> 2.2 -> 1.4 today      Cultures: rapid influenza A & B negative; COVID-19, MRSA probe, UCx, BCx x2 pending    -----------------------------------------------------------------      RAD:   MRI CERVICAL SPINE WO CONTRAST   Final Result   1.  Acute type II odontoid fracture with diffuse marrow edema of the odontoid and no evidence of separation. 2. Ligamentous complex of the odontoid remains intact. 3. Prevertebral soft tissue edema from  C2  through C4 with intact anterior longitudinal ligament      4. Mild edema of the interspinous ligament of C1-C2      5. Edematous changes of the posterior posterior atlantooccipital ligament along the periosteum      5. Traumatic injury of the nuchal ligament from the occipital insertion through the C2-3 C3 level with loss of the normal signal and the midline, torn nuchal ligament. 6. The posterior longitudinal ligament and posterior spinal laminal line remain intact. 7. Degenerative disc space narrowing, multilevel with reversal of curvature. , Spondylosis         FL MODIFIED BARIUM SWALLOW W VIDEO   Final Result      No aspiration. Please correlate with the speech pathology report for additional details. Assessment/Plan:   Jolie Manley a 79 y. o. female w/ PMHX of small cell lung cancer on chemotherapy, HTN, HLD presented to the [facility] after a fall at home.     Traumatic C1 fracture  Fall   Odontoid type IIs C1 fracture. Total .  - NSGY consulted, recs appreciated  - Spine precautions  - Cervical collar  - Q4H neurochecks  - Pain management with dilaudid  - MRI w torn nuchal ligament (from occipital bone to C2-C3 level)  - echo pending (r/u cardiac source for syncope)     Severe sepsis likely 2/2 postobstructive PNA in setting of lung cancer   POA, leukocytosis to 20, elevated LA to 7.1.   - Zosyn, Linezolid stopped (-MRSA)  - IVF  - Supplemental O2, wean as tolerated  - F/u respiratory cultures, pro-robbin     New-onset supraventricular narrow-complex tachycardia  As seen on EKG at bedside today, rate up to 180's.  Likely MAT secondary to lung cancer.  - TSH level  - Given 5mg metoprolol IV  - Restarted home metoprolol 25mg PO BID and Verpamil 180 mg PO daily  - Continuous telemetry     Chronic respiratory failure  Home oxygen requirement of 2L, placed on 4L in the ED, saturating in the high-90's. - Supplemental oxygen, wean as tolerated     Metastatic lung cancer to liver  Elevated LFTs  - Hepatitis panel  - LFTs  - GI consulted: no further tx     Chronic normocytic anemia   2/2 malignancy, chemotherapy. Hgb 9.2 POA.   - Daily CBC     Troponinemia  Tn 0.03 -> <0.01.  - Continuous telemetry    HTN  - Restart home verapamil 180mg BID today once BP permits     Code Status: Full code  FEN: easy to chew diet  PPX: SCDs  DISPO: Golden Stauffer MD, PGY-2  02/05/22  1:03 PM    This patient has been staffed and discussed with Emperatriz Abrams MD.

## 2022-02-06 VITALS
DIASTOLIC BLOOD PRESSURE: 71 MMHG | HEIGHT: 60 IN | HEART RATE: 103 BPM | OXYGEN SATURATION: 94 % | RESPIRATION RATE: 18 BRPM | WEIGHT: 204.37 LBS | TEMPERATURE: 98.1 F | SYSTOLIC BLOOD PRESSURE: 138 MMHG | BODY MASS INDEX: 40.12 KG/M2

## 2022-02-06 LAB
ALBUMIN SERPL-MCNC: 1.8 G/DL (ref 3.4–5)
ALP BLD-CCNC: 436 U/L (ref 40–129)
ALT SERPL-CCNC: 444 U/L (ref 10–40)
ANION GAP SERPL CALCULATED.3IONS-SCNC: 5 MMOL/L (ref 3–16)
AST SERPL-CCNC: 912 U/L (ref 15–37)
BASOPHILS ABSOLUTE: 0 K/UL (ref 0–0.2)
BASOPHILS RELATIVE PERCENT: 0.3 %
BILIRUB SERPL-MCNC: 1.7 MG/DL (ref 0–1)
BILIRUBIN DIRECT: 1.1 MG/DL (ref 0–0.3)
BILIRUBIN, INDIRECT: 0.6 MG/DL (ref 0–1)
BUN BLDV-MCNC: 19 MG/DL (ref 7–20)
C DIFF TOXIN/ANTIGEN: NORMAL
CALCIUM SERPL-MCNC: 7.4 MG/DL (ref 8.3–10.6)
CHLORIDE BLD-SCNC: 94 MMOL/L (ref 99–110)
CO2: 30 MMOL/L (ref 21–32)
CREAT SERPL-MCNC: <0.5 MG/DL (ref 0.6–1.2)
EOSINOPHILS ABSOLUTE: 0 K/UL (ref 0–0.6)
EOSINOPHILS RELATIVE PERCENT: 0.1 %
GFR AFRICAN AMERICAN: >60
GFR NON-AFRICAN AMERICAN: >60
GLUCOSE BLD-MCNC: 103 MG/DL (ref 70–99)
GLUCOSE BLD-MCNC: 97 MG/DL (ref 70–99)
HCT VFR BLD CALC: 25.8 % (ref 36–48)
HEMOGLOBIN: 8.5 G/DL (ref 12–16)
LYMPHOCYTES ABSOLUTE: 0.4 K/UL (ref 1–5.1)
LYMPHOCYTES RELATIVE PERCENT: 5.7 %
MAGNESIUM: 1.6 MG/DL (ref 1.8–2.4)
MCH RBC QN AUTO: 30.1 PG (ref 26–34)
MCHC RBC AUTO-ENTMCNC: 33 G/DL (ref 31–36)
MCV RBC AUTO: 91.4 FL (ref 80–100)
MONOCYTES ABSOLUTE: 0 K/UL (ref 0–1.3)
MONOCYTES RELATIVE PERCENT: 0.7 %
NEUTROPHILS ABSOLUTE: 6.2 K/UL (ref 1.7–7.7)
NEUTROPHILS RELATIVE PERCENT: 93.2 %
PDW BLD-RTO: 19.6 % (ref 12.4–15.4)
PERFORMED ON: ABNORMAL
PLATELET # BLD: 84 K/UL (ref 135–450)
PLATELET SLIDE REVIEW: ABNORMAL
PMV BLD AUTO: 9.2 FL (ref 5–10.5)
POTASSIUM REFLEX MAGNESIUM: 3.6 MMOL/L (ref 3.5–5.1)
RBC # BLD: 2.83 M/UL (ref 4–5.2)
SODIUM BLD-SCNC: 129 MMOL/L (ref 136–145)
TOTAL PROTEIN: 4.7 G/DL (ref 6.4–8.2)
WBC # BLD: 6.6 K/UL (ref 4–11)

## 2022-02-06 PROCEDURE — 80048 BASIC METABOLIC PNL TOTAL CA: CPT

## 2022-02-06 PROCEDURE — 36415 COLL VENOUS BLD VENIPUNCTURE: CPT

## 2022-02-06 PROCEDURE — 6360000002 HC RX W HCPCS

## 2022-02-06 PROCEDURE — 85025 COMPLETE CBC W/AUTO DIFF WBC: CPT

## 2022-02-06 PROCEDURE — 87324 CLOSTRIDIUM AG IA: CPT

## 2022-02-06 PROCEDURE — 87449 NOS EACH ORGANISM AG IA: CPT

## 2022-02-06 PROCEDURE — 6370000000 HC RX 637 (ALT 250 FOR IP): Performed by: INTERNAL MEDICINE

## 2022-02-06 PROCEDURE — 83735 ASSAY OF MAGNESIUM: CPT

## 2022-02-06 PROCEDURE — 2580000003 HC RX 258

## 2022-02-06 PROCEDURE — 6370000000 HC RX 637 (ALT 250 FOR IP): Performed by: NURSE PRACTITIONER

## 2022-02-06 PROCEDURE — 80076 HEPATIC FUNCTION PANEL: CPT

## 2022-02-06 PROCEDURE — 6360000002 HC RX W HCPCS: Performed by: STUDENT IN AN ORGANIZED HEALTH CARE EDUCATION/TRAINING PROGRAM

## 2022-02-06 PROCEDURE — 6370000000 HC RX 637 (ALT 250 FOR IP)

## 2022-02-06 RX ORDER — MAGNESIUM SULFATE IN WATER 40 MG/ML
2000 INJECTION, SOLUTION INTRAVENOUS ONCE
Status: COMPLETED | OUTPATIENT
Start: 2022-02-06 | End: 2022-02-06

## 2022-02-06 RX ORDER — METHOCARBAMOL 750 MG/1
750 TABLET, FILM COATED ORAL EVERY 6 HOURS
Qty: 20 TABLET | Refills: 0
Start: 2022-02-06 | End: 2022-02-11

## 2022-02-06 RX ADMIN — METHOCARBAMOL 750 MG: 750 TABLET ORAL at 16:05

## 2022-02-06 RX ADMIN — PIPERACILLIN SODIUM AND TAZOBACTAM SODIUM 4500 MG: 4; 500 INJECTION, POWDER, FOR SOLUTION INTRAVENOUS at 05:30

## 2022-02-06 RX ADMIN — SODIUM CHLORIDE, PRESERVATIVE FREE 10 ML: 5 INJECTION INTRAVENOUS at 08:40

## 2022-02-06 RX ADMIN — METHOCARBAMOL 750 MG: 750 TABLET ORAL at 05:47

## 2022-02-06 RX ADMIN — MAGNESIUM SULFATE HEPTAHYDRATE 2000 MG: 2 INJECTION, SOLUTION INTRAVENOUS at 10:07

## 2022-02-06 RX ADMIN — URSODIOL 300 MG: 300 CAPSULE ORAL at 10:11

## 2022-02-06 RX ADMIN — METHOCARBAMOL 750 MG: 750 TABLET ORAL at 08:39

## 2022-02-06 RX ADMIN — METOPROLOL TARTRATE 25 MG: 25 TABLET, FILM COATED ORAL at 08:39

## 2022-02-06 RX ADMIN — PIPERACILLIN SODIUM AND TAZOBACTAM SODIUM 4500 MG: 4; 500 INJECTION, POWDER, FOR SOLUTION INTRAVENOUS at 10:10

## 2022-02-06 RX ADMIN — SODIUM CHLORIDE 25 ML: 9 INJECTION, SOLUTION INTRAVENOUS at 05:28

## 2022-02-06 ASSESSMENT — ENCOUNTER SYMPTOMS
RHINORRHEA: 0
VOMITING: 0
CHEST TIGHTNESS: 0
SORE THROAT: 0
ABDOMINAL DISTENTION: 0
NAUSEA: 0
DIARRHEA: 0
COUGH: 0
CONSTIPATION: 0
BLOOD IN STOOL: 0
ABDOMINAL PAIN: 0
COLOR CHANGE: 0
WHEEZING: 0
SHORTNESS OF BREATH: 0

## 2022-02-06 ASSESSMENT — PAIN SCALES - GENERAL: PAINLEVEL_OUTOF10: 0

## 2022-02-06 ASSESSMENT — PAIN DESCRIPTION - PROGRESSION
CLINICAL_PROGRESSION: GRADUALLY WORSENING
CLINICAL_PROGRESSION: NOT CHANGED
CLINICAL_PROGRESSION: GRADUALLY WORSENING
CLINICAL_PROGRESSION: GRADUALLY WORSENING

## 2022-02-06 NOTE — PROGRESS NOTES
Scan of the right hip was unremarkable. CT Scan of chest remarkable for extensive consolidative changes likely related to tumor infiltration. Scattered areas of consolidation in BL lobes were suspicious for an infectious/inflammatory distribution. She received a 2.5L IV fluids and Cefepime in the ED and was transferred to [facility]. for further management.      Of note, the patient sees a chiropractor regularly and saw him after a fall in January for head and neck adjustments and followed up, the last adjustment being on the 1st of February. She reported decreased ROM at neck that prevented her from lifting her head up. ROS of system positive for dizziness earlier this year and neck pain. It was negative for fevers, chills, chest pain, SOB, cough, nausea, vomiting, diarrhea or urinary complaints.  She did not have any neurological deficits on exam.     Medications:     Scheduled Meds:   [Held by provider] atorvastatin  10 mg Oral Daily    [Held by provider] chlorthalidone  25 mg Oral Daily    ursodiol  300 mg Oral BID    verapamil  180 mg Oral BID    sodium chloride flush  5-40 mL IntraVENous 2 times per day    piperacillin-tazobactam  4,500 mg IntraVENous Q8H    metoprolol tartrate  25 mg Oral BID    methocarbamol  750 mg Oral Q6H     Continuous Infusions:   sodium chloride 25 mL (02/06/22 0528)    dextrose      dextrose       PRN Meds:sodium chloride flush, sodium chloride, acetaminophen **OR** acetaminophen, glucose, dextrose, glucagon (rDNA), dextrose, HYDROmorphone    Objective:   Vitals:   T-max:  Patient Vitals for the past 8 hrs:   BP Temp Temp src Pulse Resp SpO2 Weight   02/06/22 0652 105/68 97.5 °F (36.4 °C) Oral 104 18 94 %    02/06/22 0600    107   204 lb 5.9 oz (92.7 kg)   02/06/22 0504 131/66 99 °F (37.2 °C) Oral 107 18 97 %    02/06/22 0400    107      02/06/22 0200    94          Intake/Output Summary (Last 24 hours) at 2/6/2022 0804  Last data filed at 2/6/2022 0045  Gross per 24 hour   Intake 130 ml   Output 400 ml   Net -270 ml       Review of Systems   Constitutional: Negative for appetite change, chills and fever. HENT: Negative for congestion, hearing loss, rhinorrhea and sore throat. Eyes: Negative for visual disturbance. Respiratory: Negative for cough, chest tightness, shortness of breath and wheezing. Cardiovascular: Negative for chest pain, palpitations and leg swelling. Gastrointestinal: Negative for abdominal distention, abdominal pain, blood in stool, constipation, diarrhea, nausea and vomiting. Endocrine: Negative for polyuria. Genitourinary: Negative for difficulty urinating and dysuria. Musculoskeletal: Positive for arthralgias (right hip, left knee). Negative for myalgias. Skin: Negative for color change, pallor and rash. Neurological: Negative for dizziness, seizures, syncope, facial asymmetry, weakness, light-headedness and headaches. Psychiatric/Behavioral: Negative for behavioral problems, confusion and hallucinations. Physical Exam  Constitutional:       General: She is not in acute distress. Appearance: Normal appearance. She is not ill-appearing, toxic-appearing or diaphoretic. HENT:      Head: Normocephalic and atraumatic. Right Ear: External ear normal.      Left Ear: External ear normal.      Nose: Nose normal. No congestion or rhinorrhea. Mouth/Throat:      Mouth: Mucous membranes are moist.      Pharynx: Oropharynx is clear. Eyes:      Extraocular Movements: Extraocular movements intact. Conjunctiva/sclera: Conjunctivae normal.      Pupils: Pupils are equal, round, and reactive to light. Neck:      Comments: Cervical collar in place  Cardiovascular:      Rate and Rhythm: Tachycardia present. Rhythm irregular. Pulses: Normal pulses. Heart sounds: Normal heart sounds. No friction rub. No gallop. Pulmonary:      Effort: Pulmonary effort is normal. No respiratory distress. 02/03/22 2056 02/03/22 2056 02/04/22  1058 02/04/22 1958 02/06/22  0512   *  --  1,276*  --  912*   *  --  438*  --  444*   BILITOT 1.2*  --  1.3*  --  1.7*   BILIDIR  --   --  1.0*  --  1.1*   PROT 6.0*  --  5.1*  --  4.7*   LABALBU 2.6*   < > 2.2* 2.3* 1.8*   ALKPHOS 417*  --  332*  --  436*    < > = values in this interval not displayed. Troponin:   Recent Labs     02/03/22 2056 02/04/22 0324 02/04/22 1058   TROPONINI 0.03* <0.01 <0.01     Lactate: 7.1 -> 4.3 -> 2.2 -> 1.4 today      Cultures: rapid influenza A & B negative; COVID-19, MRSA probe, UCx, BCx x2 pending    -----------------------------------------------------------------    ECHO (2/5/22):   Summary    Normal left ventricular size.    Mild concentric left ventricular hypertrophy.    Preserved ejection fraction estimated at >60%.  Cannot exclude regional wall motion abnormalities secondary to poor    endocardial visualization.    Normal diastolic function.    Mitral annular calcification is present.    Individual aortic valve leaflets are not clearly visualized. [address] not well visualized.    Definity contrast agent was used to help visualize endocardial borders. RAD:   MRI CERVICAL SPINE WO CONTRAST   Final Result   1. Acute type II odontoid fracture with diffuse marrow edema of the odontoid and no evidence of separation. 2. Ligamentous complex of the odontoid remains intact. 3. Prevertebral soft tissue edema from  C2  through C4 with intact anterior longitudinal ligament      4. Mild edema of the interspinous ligament of C1-C2      5. Edematous changes of the posterior posterior atlantooccipital ligament along the periosteum      5. Traumatic injury of the nuchal ligament from the occipital insertion through the C2-3 C3 level with loss of the normal signal and the midline, torn nuchal ligament. 6. The posterior longitudinal ligament and posterior spinal laminal line remain intact.       7. Degenerative disc space narrowing, multilevel with reversal of curvature. , Spondylosis         FL MODIFIED BARIUM SWALLOW W VIDEO   Final Result      No aspiration. Please correlate with the speech pathology report for additional details. Assessment/Plan:   Merline madera 79 y. o. female w/ PMHX of small cell lung cancer on chemotherapy, HTN, HLD presented to the [facility] after a fall at home.     Traumatic C1 fracture  Fall   Odontoid type IIs C1 fracture. Total .  - NSGY consulted, recs appreciated, no neurosurgical intervention at this time  - Spine precautions  - Cervical collar on at all times except to eat and bathe  - Q4H neurochecks  - Pain management with dilaudid 0.5 Q6H PRN severe pain     Severe sepsis likely 2/2 postobstructive PNA in setting of lung cancer   POA, leukocytosis to 20, elevated LA to 7.1, procalcitonin 11. 15. COVID-19 not detected by PCR; influenza A/B negative; legionella presumed negative; MRSA nasal probe negative. BCx x1 with gram positive aerobic cocci in clusters resembling Staphylococci. Tmax 99 overnight. - Zosyn  - IVF  - Supplemental O2, wean as tolerated  - F/u respiratory cultures    Acute diarrhea  New-onset after antibiotic treatment.  - C. Diff test    Hyponatremia  Likely related to decreased PO intake recently. - Urine studies     New-onset supraventricular narrow-complex tachycardia  As seen on EKG at bedside Friday, rate up to 180's. Likely MAT secondary to lung cancer. TSH 0.53. ECHO with EF >60%, mild concentric LVH, mitral annular calcification.   - Contninuehome metoprolol 25mg PO BID  - Continue home verapamil 180mg BID  - Continuous telemetry     Immunosuppression  Likely 2/2 chemotherapy. - Monitor for signs & symptoms of infection, currently on Zosyn     Chronic respiratory failure  Home oxygen requirement of 2L, placed on 4L in the ED, saturating in the high-90's.    - Supplemental oxygen, wean as tolerated     Metastatic lung cancer to liver  Elevated LFTs  Thrombocytopenia  Likely secondary to metastatic lung cancer to the liver, may be a component of ischemic hepatitis involved. - CMP daily  - GI consulted, recs appreciated - no further serologic workup     Chronic normocytic anemia   2/2 malignancy, chemotherapy. Hgb 9.2 POA.   - Daily CBC     Troponinemia  Tn 0.03 -> <0.01.  - Continuous telemetry    HTN  - Restart home verapamil 180mg BID today once BP permits    Heat rash of the bilateral undersides of the breats  - Continue with intra-dry with barrier cream to the undersides of the breasts       Code Status: Full code  FEN: Adult easy to chew diet, NS @ 100mL/h  PPX: SCDs  DISPO: JOSE D Carr MD, PGY-1  02/06/22  8:04 AM    This patient has been staffed and discussed with Shai Keen MD.

## 2022-02-06 NOTE — PROGRESS NOTES
4 Eyes Admission Assessment     I agree as the admission nurse that 2 RN's have performed a thorough Head to Toe Skin Assessment on the patient. ALL assessment sites listed below have been assessed on admission. Areas assessed by both nurses:    [x]   Head, Face, and Ears   [x]   Shoulders, Back, and Chest  [x]   Arms, Elbows, and Hands   [x]   Coccyx, Sacrum, and Ischium  [x]   Legs, Feet, and Heels        Does the Patient have Skin Breakdown? Blanchable redness bilat heels, 2 cm stage two wounds bilat buttocks (2 wounds one on each side of the pt's buttocks, both stage 2) , groin and L breast excoriated, Skin tear R elbow. Right breast excoriated, redness under abdominal folds.           Rajendra Prevention initiated:  Yes   Wound Care Orders initiated:  No      [facility] nurse consulted for Pressure Injury (Stage 3,4, Unstageable, DTI, NWPT, and Complex wounds) or Rajendra score 18 or lower:  No      Nurse 1 eSignature: Electronically signed by Karissa Mo RN on 2/6/22 at 7:34 AM EST    SHARE this note so that the co-signing nurse is able to place an eSignature    Nurse 2 eSignature: Electronically signed by Melissa Harper RN on 2/6/22 at 7:48 AM EST

## 2022-02-06 NOTE — PROGRESS NOTES
Admitted to 5 tower. A/Ox3.  collar on and aligned. Will place tele. Bed alarm set. Call light in reach. Will continue to monitor.

## 2022-02-06 NOTE — PROGRESS NOTES
I received a call from [facility] transport stating that the pt has a bed available in room 5315, and that I can call report at [phone]. I perfect serve Dr. Bradford about the room being available so that he can place transfer orders in. I spoke to a nurse at [facility] for that unit and she states that a Dr. Estuardo Vance will be accepting the pt. I updated Dr. Bradford who will call and update the pt's brother. I attempted to call report to the nurse over at [facility] who will be taking over the pt's care there ( Chaz Beverly). Nurse Chaz Beverly states that she will be busy for the next 10 minutes and asked if I could call her back. I will attempt to call report again in about 10 minutes. I will continue to follow throughout the shift.  Juan Antonio Mo RN

## 2022-02-06 NOTE — PROGRESS NOTES
Pt was moved over to a speciality mattress at this time, the bed was zeroed for daily weights prior to the pt being transferred over. The pt was then sat up and offered food and drink, the pt only took 2 bites of oranges and didn't want any more. The pt didn't want her yogurt either. Pt repositioned for comfort, pt's briefs are currently clean and dry. interdry applied to pt's skin folds for skin care management.   Talita Preciado RN

## 2022-02-06 NOTE — PROGRESS NOTES
Message sent to medical resident about obtaining the pt's urine samples, the pt is incontinent and is having diarrhea which makes it challenging to obtain urine. I asked if they would like the pt to be SC for the urine sample. Secondly I asked for some skin care cream such as nystatin for her red and excoriated areas. Waiting on new orders/response. I will continue to follow throughout the shift.   Gaurav Elaine RN

## 2022-02-06 NOTE — PROGRESS NOTES
Pt had another loose stool, stool collected/labled at bedside and walked down to the lab on ice. Pt turned and repositioned, skin care done, pt bathed.   Karlene Solomon RN

## 2022-02-06 NOTE — PROGRESS NOTES
Transport set up for 1900. [facility] the nurse at [facility] and updated her on the transport time.  Darius De La Cruz RN

## 2022-02-06 NOTE — PROGRESS NOTES
Pt was transferred from PCU this morning around shift change, the pt is incontinent and has been changed twice since the start of my shift at 0730. The pt's first incontinence was with loose stool and the second time was just urine. Pt place in c-diff precautions at this time for the loose stool. The pt has multiple skin issues, a specialty bed will be ordered for her, skin care has been done per hopital protocol such as placing intra-dry under skin folds and barrier cream after incontinence and after orlando care. Pt is being turned and repositioned frequently, waiting on special mattress to arrive. I will continue to follow throughout the shift.   Marcy Talamantes, RN

## 2022-02-06 NOTE — CARE COORDINATION
Social Work Note                    Date: 2/6/2022     Patient Name: Sheila Larios    Date of Admission: 2/4/2022  2:03 AM  YOB: 1940    Length of Stay: 2  GMLOS: 6.78           Diagnosis:Sepsis ([address] [A41.9]     ________________________________________________________________________________________  Discharge Plan: Other: Transfer to [facility] ([place] [address])  Room 5313   Report: [phone]    Insurance: Payor: Jean-Pierre Maldonado / Plan: [address] HMO / Product Type: Medicare /   Pre-cert needed for placement?: n/a  Pre-cert initiated?: n/a     Tentative ischarge date: 2/6     Current barriers: Transport arrival     Referrals completed: Not Applicable    Resources/information provided: Not indicated at this time   ________________________________________________________________________________________  Notes  ALESSANDRA placed call to [facility] Transfer center (they were not active on case)    ALESSANDRA placed call to [facility] ([phone]). They do not arrange transport. [facility] Team would have to arrange. ALESSANDRA placed call to [address] ([phone]) at 4:29 pm. Transport arranged for 7:00 pm     ALESSANDRA updated bedside RN of discharge. ALESSANDRA completed transport form and pre-pared transfer packet.      Patient and/or family were provided with choice of provider?:  Yes     Patient and/or family are in agreement with the discharge plan at this time?: yes     Care Transition Patient: No    Karron Charm Day, MSW  Social Work  [facility]  Ph: [phone]

## 2022-02-06 NOTE — PROGRESS NOTES
Report was called to the nurse who will be taking over the pt's care at [facility] in room 5315. Full report given and all questions answered. I will continue to follow throughout the shift, waiting to hear back from the transfer center for the time of transfer . Per -[place] request IV and Port will remain accessed during transport.    Petr Eric RN

## 2022-02-06 NOTE — DISCHARGE INSTR - COC
Continuity of Care Form    Patient Name: Mikael Li   :  1940  MRN:  [id number]    Admit date:  2022  Discharge date:  ***    Code Status Order: Full Code   Advance Directives:      Admitting Physician:  Addie Ruby MD  PCP: Mitch Estrada MD    Discharging Nurse: [facility] Unit/Room#: 2216/4799-72  Discharging Unit Phone Number: ***    Emergency Contact:   Extended Emergency Contact Information  Primary Emergency Contact: Isreal He [address] Phone: [phone]  Mobile Phone: [phone]  Relation: Brother/Sister  Secondary Emergency Contact: [address] [place] [address] Phone: [phone]  Relation: Niece/Nephew    Past Surgical History:  Past Surgical History:   Procedure Laterality Date    APPENDECTOMY      BRONCHOSCOPY N/A 2020    BRONCHOSCOPY ALVEOLAR LAVAGE performed by Veronica Gupta MD at [facility] 2020    BRONCHOSCOPY BRUSHINGS performed by Veronica Gupta MD at [address] [place] [address] N/A 2020    BRONCHOSCOPY BIOPSY BRONCHUS performed by Veronica Gupta MD at [address] [place] [address]  2020    BRONCHOSCOPY W/EBUS FNA performed by Veronica Gupta MD at [address] [place] [address] N/A 2020    BRONCHOSCOPY ENDOBRONCHIAL ULTRASOUND performed by Veronica Gupta MD at [address] [place] [address] Bilateral     TONSILLECTOMY         Immunization History:   Immunization History   Administered Date(s) Administered    COVID-19, Pfizer Purple top, DILUTE for use, 12+ yrs, 30mcg/0.3mL dose 2021, 2021, 2021    Influenza, High Dose (Fluzone 65 yrs and older) 2014, 2015, 10/24/2016, 2017, 2018    Influenza, Intradermal, Preservative free 10/11/2013    Influenza, Quadv, adjuvanted, 65 yrs +, IM, PF (Fluad) 10/20/2020, 10/07/2021    Influenza, Triv, inactivated, subunit, adjuvanted, IM (Fluad 65 yrs and older) 12/18/2019    Pneumococcal Conjugate 13-valent ([id number]) 12/31/2014    Pneumococcal Polysaccharide ([id number]) 08/29/2016, 10/24/2016    Tdap (Boostrix, Adacel) 08/10/2015    Zoster Live (Zostavax) 05/24/2011       Active Problems:  Patient Active Problem List   Diagnosis Code    OA (osteoarthritis) of knee M17.10    HTN (hypertension) I10    Hyperlipidemia E78.5    Primary osteoarthritis of knee M17.10    Acquired varus deformity knee M21.169    Primary osteoarthritis of left knee M17.12    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    Chronic pain of left knee M25.562, G89.29    Primary osteoarthritis of right knee M17.11    Chondromalacia patellae of right knee M22.41    Chronic pain of right knee M25.561, G89.29    Elevated liver enzymes R74.8    SOB (shortness of breath) R06.02    Mediastinal adenopathy R59.0    Hilar adenopathy R59.0    Pulmonary infiltrate R91.8    Pleural effusion on right J90    Hepatic lesion K76.9    Pyuria R82.81    Hypokalemia E87.6    Suspected sleep apnea R29.818    Neuroendocrine tumor D3A.8    Chronic hypoxemic respiratory failure (HCC) J96.11    Atelectasis J98.11    Small cell lung cancer, right (HCC) C34.91    Pressure ulcer of right buttock, stage 3 (HCC) L89.313    Anemia of chronic disease D63.8    Sepsis ([facility] [address] A41.9       Isolation/Infection:   Isolation            No Isolation          Patient Infection Status       Infection Onset Added Last Indicated Last Indicated By Review Planned Expiration Resolved Resolved By    None active    Resolved    C-diff Rule Out 02/06/22 02/06/22 02/06/22 Clostridium difficile toxin/antigen (Ordered)   02/06/22 Rule-Out Test Resulted    Negative for c-diff.     COVID-19 (Rule Out) 02/03/22 02/03/22 02/03/22 COVID-19 (Ordered)   02/05/22 Rule-Out Test Resulted            Nurse Assessment:  Last Vital Signs: /71   Pulse 99   Temp 98.1 °F (36.7 °C) (Oral)   Resp 18   Ht 5' (1.524 m)   Wt 204 lb 5.9 oz (92.7 kg)   LMP 05/20/1991 SpO2 94%   BMI 39.91 kg/m²     Last documented pain score (0-10 scale): Pain Level: 0  Last Weight:   Wt Readings from Last 1 Encounters:   22 204 lb 5.9 oz (92.7 kg)     Mental Status:  {IP PT MENTAL STATUS:}    IV Access:  { RUBIO IV ACCESS:397967688}    Nursing Mobility/ADLs:  Walking   {P DME OKAJ:201845013}  Transfer  {P DME BKIF:999417172}  Bathing  {CHP DME QZPV:612801468}  Dressing  {CHP DME VVJF:756212210}  Toileting  {CHP DME DYKR:118437515}  Feeding  {[facility] DME UJBB:565510837}  Med Admin  {P DME LZSC:596795241}  Med Delivery   { RUBIO MED Delivery:026698723}    Wound Care Documentation and Therapy:        Elimination:  Continence: Bowel: {YES / ED:24603}  Bladder: {YES / CW:39481}  Urinary Catheter: {Urinary Catheter:855419314}   Colostomy/Ileostomy/Ileal Conduit: {YES / CN:61260}       Date of Last BM: ***    Intake/Output Summary (Last 24 hours) at 2022 1703  Last data filed at 2022 1403  Gross per 24 hour   Intake 440 ml   Output 400 ml   Net 40 ml     I/O last 3 completed shifts: In: 140 [P.O.:120;  I.V.:20]  Out: 950 [Urine:950]    Safety Concerns:     [address] Safety Concerns:281537552}    Impairments/Disabilities:      { RUBIO Impairments/Disabilities:297461345}    Nutrition Therapy:  Current Nutrition Therapy:   [address] Diet List:307928366}    Routes of Feeding: {[facility] DME Other Feedings:178267587}  Liquids: {Slp liquid thickness:93842}  Daily Fluid Restriction: {CHP DME Yes amt example:468482327}  Last Modified Barium Swallow with Video (Video Swallowing Test): {Done Not Done WUQZ:799133983}    Treatments at the Time of Hospital Discharge:   Respiratory Treatments: ***  Oxygen Therapy:  {Therapy; copd oxygen:08919}  Ventilator:    { CC Vent MUTF:426210349}    Rehab Therapies: {THERAPEUTIC INTERVENTION:8437918187}  Weight Bearing Status/Restrictions: { CC Weight Bearin}  Other Medical Equipment (for information only, NOT a DME order): {EQUIPMENT:648440440}  Other Treatments: ***    Patient's personal belongings (please select all that are sent with patient):  {CHP DME Belongings:982343362}    RN SIGNATURE:  {Esignature:199704919}    CASE MANAGEMENT/SOCIAL WORK SECTION    Inpatient Status Date: ***    Readmission Risk Assessment Score:  Readmission Risk              Risk of Unplanned Readmission:  21           Discharging to Facility/ Agency   Name:   Address:  Phone:  Fax:    Dialysis Facility (if applicable)   Name:  Address:  Dialysis Schedule:  Phone:  Fax:    / signature: {Esignature:952690958}    PHYSICIAN SECTION    Prognosis: {Prognosis:4401073605}    Condition at Discharge: [facility] Patient Condition:288135033}    Rehab Potential (if transferring to Rehab): {Prognosis:8851493307}    Recommended Labs or Other Treatments After Discharge: ***    Physician Certification: I certify the above information and transfer of Christy Falcon  is necessary for the continuing treatment of the diagnosis listed and that she requires {Admit to Appropriate Level of Care:85952} for {GREATER/LESS:466374243} 30 days.      Update Admission H&P: {CHP DME Changes in SWIKI:191235700}    PHYSICIAN SIGNATURE:  {Esignature:128760800}

## 2022-02-06 NOTE — PLAN OF CARE
Problem: Pain:  Goal: Pain level will decrease  Description: Pain level will decrease  Outcome: Ongoing  Note: Pt denies pain at this time. Problem: Mobility - Impaired:  Goal: Able to ambulate independently  Description: Able to ambulate independently  Outcome: Ongoing  Note: Turning pt Q2H at this time. Problem: Falls - Risk of:  Goal: Will remain free from falls  Description: Will remain free from falls  Outcome: Ongoing  Note: Fall precautions are in place. Bed alarm is on and in lowest position. Pt is using call light appropriately. Will continue to monitor.

## 2022-02-06 NOTE — PROGRESS NOTES
On call Resident will come and take a look at the pt's skin and order the proper skin care medication for the pt. Per on call resident hold off on Straight cathing the pt for urine at this time, and check with the pt's primary team tomorrow if they want her to be SC for those urine labs or not. The pt is currently resting in bed with her eyes closed and breathing easily, the pt appears comfortable. I will continue to follow throughout the shift.   Petr Eric, RN

## 2022-02-06 NOTE — PROGRESS NOTES
Pt was incontinent of stool, orlando care done, brief changed, skin care done. Pt turned and repositioned. Pt offered food and drink. Pt denies any other needs at this time, call light in reach, and bed alarm on.   Bipin Yanes RN

## 2022-02-07 LAB
BLOOD CULTURE, ROUTINE: NORMAL
CULTURE, BLOOD 2: ABNORMAL
CULTURE, BLOOD 2: ABNORMAL
ORGANISM: ABNORMAL

## 2022-02-07 NOTE — PROGRESS NOTES
Transport arrived to pickup pt. Report given to medic. Packet received by them. Pt safely transferred to [facility]. Pt discharged.

## 2022-02-07 NOTE — ED NOTES
[facility]   Emergency Department Culture Follow-Up       Candelario Gaviria (CSN: [id number]) was seen and evaluated at [facility] Emergency Department on 2/3/22 by provider  Dr. Phoenix Starkey. A blood culture was positive and is growing micrococcus luteus 1/2. Sensitivity results: N/A    Patient was transferred to [facility] were she got zosyn for PNA. Discussed with Dr. Amada Singh that our 1/2 blood cx result is a containment. No further f/u necessary. Follow-Up:    No additional follow-up necessary at this time.      Thank you,    Shante Millard, [address] [place] [address]  2/7/2022

## 2022-02-07 NOTE — DISCHARGE SUMMARY
INTERNAL MEDICINE DEPARTMENT AT [address] [place] [address]  DISCHARGE SUMMARY    Patient ID: Yue Crain                                             Discharge Date: 2/6/2022   Patient's PCP: Yuni Rowland MD                                          Discharge Physician: Raisa Polk MD MD  Admit Date: 2/4/2022   Admitting Physician: Shereen Zhang MD    PROBLEMS DURING HOSPITALIZATION:  Present on Admission:   Sepsis ([address]      DISCHARGE DIAGNOSES:  1. Severe sepsis likely secondary to postobstructive pneumonia (PNA)  2. Pneumonia  3. Acute diarrhea  4. Hyponatremia  5. New-onset supraventricular narrow-complex tachycardia  6. Immunosuppression  7. Chronic respiratory failure  8. Lung cancer with metastasis to the liver  9. Elevated LFTs  10. Thrombocytopenia  11. Chronic normocytic anemia  12. Troponinemia  13. Hypertension  14. 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 o. female w/ PMHX of small cell lung cancer on chemotherapy, HTN, HLD presented to the [facility] after a fall at home. The patient uses a walker at home to ambulate and lives by herself. She fell around 6 PM when she was walking from kitchen to lounge, and was found by the local police 2.5 hours on the floor. She was unsure if she felt dizzy or stumbled upon an object, however fell on her hips. She does not report hitting her head or losing consciousness.      On arrival to the ED, she was AOx4, hypotensive to systolic 14'E, [id number] in 130's, normal RR, Saturating 98% on 4L Oxygen. (The patient uses 2L oxygen at baseline). Labs were remarkable for WBC 20.4, Hb 10.8, LA 7.1, Anion gap 19, Troponin 0.03 without EKG changes other than sinus tachycardia, GI liver profile remarkable for Alk Phos 417, , , Bilirubin 1.2.  Blood cultures were sent, UA was unremarkable, Rapid flu negative, Carboxyhemoglobin 4.2.     CT head was unremarkable for acute process, CT cervical spine remarkable for Acute type 2 odontoid fracture with slight anterior displacement of the C1 and odontoid process in relation to the body of C2. CT Scan of the right hip was unremarkable. CT Scan of chest remarkable for extensive consolidative changes likely related to tumor infiltration. Scattered areas of consolidation in BL lobes were suspicious for an infectious/inflammatory distribution. She received a 2.5L IV fluids and Cefepime in the ED and was transferred to [facility] for further management.      Of note, the patient sees a chiropractor regularly and saw him after a fall in January for head and neck adjustments and followed up, the last adjustment being on the 1st of February. She reported decreased ROM at neck that prevented her from lifting her head up. ROS of system positive for dizziness earlier this year and neck pain. It was negative for fevers, chills, chest pain, SOB, cough, nausea, vomiting, diarrhea or urinary complaints. She did not have any neurological deficits on exam.     The following issues were addressed during hospitalization:    Traumatic C1 fracture  Fall   Odontoid type IIs C1 fracture. Total .  - NSGY consulted, recs appreciated, no neurosurgical intervention at this time  - Spine precautions  - Cervical collar on at all times except to eat and bathe  - Q4H neurochecks  - Pain management with dilaudid 0.5 Q6H PRN severe pain     Severe sepsis likely 2/2 postobstructive PNA in setting of lung cancer   POA, leukocytosis to 20, elevated LA to 7.1, procalcitonin 11. 15. COVID-19 not detected by PCR; influenza A/B negative; legionella presumed negative; MRSA nasal probe negative. BCx x1 with gram positive aerobic cocci in clusters resembling Staphylococci. Tmax 99 overnight. - Zosyn  - IVF  - Supplemental O2, wean as tolerated  - F/u respiratory cultures     Acute diarrhea  New-onset after antibiotic treatment.  - C.  Diff test     Hyponatremia  Likely related to decreased PO intake recently. - Urine studies     New-onset supraventricular narrow-complex tachycardia  As seen on EKG at bedside Friday, rate up to 180's. Likely MAT secondary to lung cancer. TSH 0.53. ECHO with EF >60%, mild concentric LVH, mitral annular calcification.   - Contninuehome metoprolol 25mg PO BID  - Continue home verapamil 180mg BID  - Continuous telemetry     Immunosuppression  Likely 2/2 chemotherapy. - Monitor for signs & symptoms of infection, currently on Zosyn     Chronic respiratory failure  Home oxygen requirement of 2L, placed on 4L in the ED, saturating in the high-90's. - Supplemental oxygen, wean as tolerated     Metastatic lung cancer to liver  Elevated LFTs  Thrombocytopenia  Likely secondary to metastatic lung cancer to the liver, may be a component of ischemic hepatitis involved. - CMP daily  - GI consulted, recs appreciated - no further serologic workup     Chronic normocytic anemia   2/2 malignancy, chemotherapy. Hgb 9.2 POA. - Daily CBC     Troponinemia  Tn 0.03 -> <0.01.  - Continuous telemetry     HTN  - Restart home verapamil 180mg BID today once BP permits     Heat rash (Miliarai) of the bilateral undersides of the breats  - Continue with intra-dry with barrier cream to the undersides of the breasts      Ms. Adriana Parry is transferred from [facility] to continue her care at [facility], where the patient receives her primary oncology care. Physical Exam:  /71   Pulse 103   Temp 98.1 °F (36.7 °C) (Oral)   Resp 18   Ht 5' (1.524 m)   Wt 204 lb 5.9 oz (92.7 kg)   LMP 05/20/1991   SpO2 94%   BMI 39.91 kg/m²     Physical Exam  Constitutional:       General: She is not in acute distress. Appearance: Normal appearance. She is not ill-appearing, toxic-appearing or diaphoretic. HENT:      Head: Normocephalic and atraumatic. Right Ear: External ear normal.      Left Ear: External ear normal.      Nose: Nose normal. No congestion or rhinorrhea.       Mouth/Throat: Mouth: Mucous membranes are moist.      Pharynx: Oropharynx is clear. Eyes:      Extraocular Movements: Extraocular movements intact. Conjunctiva/sclera: Conjunctivae normal.      Pupils: Pupils are equal, round, and reactive to light. Neck:      Comments: Cervical collar in place  Cardiovascular:      Rate and Rhythm: Tachycardia present. Rhythm irregular. Pulses: Normal pulses. Heart sounds: Normal heart sounds. No friction rub. No gallop. Pulmonary:      Effort: Pulmonary effort is normal. No respiratory distress. Breath sounds: Normal breath sounds. No wheezing or rales. Abdominal:      General: Abdomen is flat. Bowel sounds are normal. There is no distension. Palpations: Abdomen is soft. There is no mass. Tenderness: There is no abdominal tenderness. There is no guarding or rebound. Hernia: No hernia is present. Musculoskeletal:         General: No swelling, tenderness, deformity or signs of injury. Normal range of motion. Right lower leg: Edema (1+ to the knee) present. Left lower leg: Edema (1+ to the knee) present. Skin:     General: Skin is warm and dry. Capillary Refill: Capillary refill takes less than 2 seconds. Coloration: Skin is not jaundiced or pale. Findings: Rash (heat rash under the breasts) present. Neurological:      General: No focal deficit present. Mental Status: She is alert and oriented to person, place, and time. Cranial Nerves: No cranial nerve deficit. Psychiatric:         Mood and Affect: Mood normal.         Behavior: Behavior normal.         Thought Content:  Thought content normal.         Judgment: Judgment normal.       Consults: GI, Neurosurgery, Palliative Care and dietitian  Significant Diagnostic Studies:  CT scan hip, cervical spine, chest abdomen and pelvis; modified barium swallow; MRI of the cervical spine  Treatments: cervical neck collar, antibiotics: Zosyn and supplemental oxygen  Disposition: [facility]  Discharged Condition: Stable  Follow Up: Primary Care Physician in one week    DISCHARGE MEDICATION:     Medication List      START taking these medications    methocarbamol 750 MG tablet  Commonly known as: ROBAXIN  Take 1 tablet by mouth every 6 hours for 5 days        CONTINUE taking these medications    metoprolol tartrate 25 MG tablet  Commonly known as: LOPRESSOR  TAKE ONE TABLET BY MOUTH TWICE A DAY     simvastatin 20 MG tablet  Commonly known as: ZOCOR  TAKE ONE TABLET BY MOUTH AT BEDTIME     verapamil 180 MG extended release tablet  Commonly known as: CALAN SR  TAKE ONE TABLET BY MOUTH TWICE A DAY        STOP taking these medications    lisinopril 40 MG tablet  Commonly known as: PRINIVIL;ZESTRIL     magnesium oxide 400 MG tablet  Commonly known as: MAG-OX     potassium chloride 20 MEQ extended release tablet  Commonly known as: KLOR-CON M     potassium chloride 20 MEQ/15ML (10%) oral solution     Vitamin D3 50 MCG (2000 UT) Caps           Where to Get Your Medications      Information about where to get these medications is not yet available    Ask your nurse or doctor about these medications  · methocarbamol 750 MG tablet       Activity: activity as tolerated with fall precautions; keep spinal collar in place  Diet: regular diet  Wound Care: as directed    Time Spent on discharge is more than 30 minutes    Signed:  Jatinder Rosa MD, PGY-1  Internal Medicine Resident  2/7/2022

## (undated) DEVICE — GOWN AURORA NONREINF LG: Brand: MEDLINE INDUSTRIES, INC.

## (undated) DEVICE — 60 ML SYRINGE,REGULAR TIP: Brand: MONOJECT

## (undated) DEVICE — PROCEDURE KIT ENDOSCP CUST

## (undated) DEVICE — SINGLE USE BIOPSY VALVE MAJ-210: Brand: SINGLE USE BIOPSY VALVE (STERILE)

## (undated) DEVICE — FORCEPS BX L100CM JAW DIA1.8MM CHN 2MM PULM W/ NDL DISP

## (undated) DEVICE — DISPOSABLE CYTOLOGY BRUSH: Brand: DISPOSABLE CYTOLOGY BRUSH

## (undated) DEVICE — Device: Brand: BALLOON

## (undated) DEVICE — MACROBORE EXTN SET W/ 1 SMRTSITE NEEDLE-FREE VLV PRT

## (undated) DEVICE — SYRINGE MED 10ML POLYPR LUERSLIP TIP FLAT TOP W/O SFTY DISP

## (undated) DEVICE — ADAPTER TBNG DIA15MM SWVL FBROPT BRONCHSCP TERM 2 AXIS PEEP

## (undated) DEVICE — Device: Brand: MEDEX

## (undated) DEVICE — CONMED CHANNEL MASTER PULMONARY AND PEDIATRIC CLEANING BRUSH, 160 CM X 2.0 MM: Brand: CONMED

## (undated) DEVICE — NEEDLE ASPIR 19GA HISTOLOGY FLX VIZISHOT 2

## (undated) DEVICE — SINGLE USE SUCTION VALVE MAJ-209: Brand: SINGLE USE SUCTION VALVE (STERILE)